# Patient Record
Sex: FEMALE | Race: WHITE | NOT HISPANIC OR LATINO | Employment: OTHER | URBAN - METROPOLITAN AREA
[De-identification: names, ages, dates, MRNs, and addresses within clinical notes are randomized per-mention and may not be internally consistent; named-entity substitution may affect disease eponyms.]

---

## 2017-01-19 ENCOUNTER — COMMUNICATION - HEALTHEAST (OUTPATIENT)
Dept: FAMILY MEDICINE | Facility: CLINIC | Age: 67
End: 2017-01-19

## 2017-01-19 ENCOUNTER — COMMUNICATION - HEALTHEAST (OUTPATIENT)
Dept: CARDIOLOGY | Facility: CLINIC | Age: 67
End: 2017-01-19

## 2017-01-19 DIAGNOSIS — E78.5 HYPERLIPIDEMIA: ICD-10-CM

## 2017-04-22 ENCOUNTER — COMMUNICATION - HEALTHEAST (OUTPATIENT)
Dept: FAMILY MEDICINE | Facility: CLINIC | Age: 67
End: 2017-04-22

## 2017-04-22 DIAGNOSIS — K29.70 GASTRITIS: ICD-10-CM

## 2017-04-26 ENCOUNTER — COMMUNICATION - HEALTHEAST (OUTPATIENT)
Dept: FAMILY MEDICINE | Facility: CLINIC | Age: 67
End: 2017-04-26

## 2017-04-26 DIAGNOSIS — K29.70 GASTRITIS: ICD-10-CM

## 2017-05-02 ENCOUNTER — COMMUNICATION - HEALTHEAST (OUTPATIENT)
Dept: SCHEDULING | Facility: CLINIC | Age: 67
End: 2017-05-02

## 2017-05-03 ENCOUNTER — COMMUNICATION - HEALTHEAST (OUTPATIENT)
Dept: FAMILY MEDICINE | Facility: CLINIC | Age: 67
End: 2017-05-03

## 2017-05-10 ENCOUNTER — OFFICE VISIT - HEALTHEAST (OUTPATIENT)
Dept: FAMILY MEDICINE | Facility: CLINIC | Age: 67
End: 2017-05-10

## 2017-05-10 DIAGNOSIS — Z95.1 S/P CABG X 3: ICD-10-CM

## 2017-05-10 DIAGNOSIS — I20.9 ANGINA PECTORIS (H): ICD-10-CM

## 2017-05-10 DIAGNOSIS — Z09 HOSPITAL DISCHARGE FOLLOW-UP: ICD-10-CM

## 2017-05-10 DIAGNOSIS — K21.9 GERD (GASTROESOPHAGEAL REFLUX DISEASE): ICD-10-CM

## 2017-05-10 DIAGNOSIS — E78.5 HYPERLIPIDEMIA: ICD-10-CM

## 2017-05-10 ASSESSMENT — MIFFLIN-ST. JEOR: SCORE: 1520.7

## 2017-05-11 ENCOUNTER — OFFICE VISIT - HEALTHEAST (OUTPATIENT)
Dept: CARDIOLOGY | Facility: CLINIC | Age: 67
End: 2017-05-11

## 2017-05-11 DIAGNOSIS — Z95.1 S/P CABG X 3: ICD-10-CM

## 2017-05-11 DIAGNOSIS — I25.10 CORONARY ARTERY DISEASE DUE TO CALCIFIED CORONARY LESION: ICD-10-CM

## 2017-05-11 DIAGNOSIS — I25.84 CORONARY ARTERY DISEASE DUE TO CALCIFIED CORONARY LESION: ICD-10-CM

## 2017-05-11 ASSESSMENT — MIFFLIN-ST. JEOR: SCORE: 1520.7

## 2017-05-12 ENCOUNTER — COMMUNICATION - HEALTHEAST (OUTPATIENT)
Dept: CARDIOLOGY | Facility: CLINIC | Age: 67
End: 2017-05-12

## 2017-05-16 ENCOUNTER — COMMUNICATION - HEALTHEAST (OUTPATIENT)
Dept: FAMILY MEDICINE | Facility: CLINIC | Age: 67
End: 2017-05-16

## 2017-05-16 DIAGNOSIS — Z95.1 S/P CABG X 3: ICD-10-CM

## 2017-05-22 ENCOUNTER — SURGERY - HEALTHEAST (OUTPATIENT)
Dept: CARDIOLOGY | Facility: CLINIC | Age: 67
End: 2017-05-22

## 2017-05-24 ENCOUNTER — SURGERY - HEALTHEAST (OUTPATIENT)
Dept: CARDIOLOGY | Facility: CLINIC | Age: 67
End: 2017-05-24

## 2017-05-24 ASSESSMENT — MIFFLIN-ST. JEOR: SCORE: 1495.64

## 2017-06-02 ENCOUNTER — OFFICE VISIT - HEALTHEAST (OUTPATIENT)
Dept: CARDIOLOGY | Facility: CLINIC | Age: 67
End: 2017-06-02

## 2017-06-02 DIAGNOSIS — Z95.1 HX OF CABG: ICD-10-CM

## 2017-06-02 ASSESSMENT — MIFFLIN-ST. JEOR: SCORE: 1490.65

## 2017-06-20 ENCOUNTER — COMMUNICATION - HEALTHEAST (OUTPATIENT)
Dept: FAMILY MEDICINE | Facility: CLINIC | Age: 67
End: 2017-06-20

## 2017-06-20 DIAGNOSIS — Z95.1 S/P CABG X 3: ICD-10-CM

## 2017-06-22 ENCOUNTER — COMMUNICATION - HEALTHEAST (OUTPATIENT)
Dept: CARDIOLOGY | Facility: CLINIC | Age: 67
End: 2017-06-22

## 2017-07-07 ENCOUNTER — OFFICE VISIT - HEALTHEAST (OUTPATIENT)
Dept: CARDIOLOGY | Facility: CLINIC | Age: 67
End: 2017-07-07

## 2017-07-07 DIAGNOSIS — I25.84 CORONARY ARTERY DISEASE DUE TO CALCIFIED CORONARY LESION: ICD-10-CM

## 2017-07-07 DIAGNOSIS — I25.10 CORONARY ARTERY DISEASE DUE TO CALCIFIED CORONARY LESION: ICD-10-CM

## 2017-07-07 ASSESSMENT — MIFFLIN-ST. JEOR: SCORE: 1490.65

## 2017-07-10 ENCOUNTER — HOSPITAL ENCOUNTER (OUTPATIENT)
Dept: CARDIOLOGY | Facility: HOSPITAL | Age: 67
Discharge: HOME | End: 2017-07-10
Attending: INTERNAL MEDICINE

## 2017-07-10 DIAGNOSIS — I25.84 CORONARY ARTERY DISEASE DUE TO CALCIFIED CORONARY LESION: ICD-10-CM

## 2017-07-10 DIAGNOSIS — I25.10 CORONARY ARTERY DISEASE DUE TO CALCIFIED CORONARY LESION: ICD-10-CM

## 2017-09-13 ENCOUNTER — RECORDS - HEALTHEAST (OUTPATIENT)
Dept: GENERAL RADIOLOGY | Facility: CLINIC | Age: 67
End: 2017-09-13

## 2017-09-13 ENCOUNTER — OFFICE VISIT - HEALTHEAST (OUTPATIENT)
Dept: FAMILY MEDICINE | Facility: CLINIC | Age: 67
End: 2017-09-13

## 2017-09-13 DIAGNOSIS — M79.673 PAIN IN UNSPECIFIED FOOT: ICD-10-CM

## 2017-09-13 DIAGNOSIS — M79.673 FOOT PAIN: ICD-10-CM

## 2017-09-13 DIAGNOSIS — M79.642 HAND PAIN, LEFT: ICD-10-CM

## 2017-09-13 DIAGNOSIS — M79.642 PAIN IN LEFT HAND: ICD-10-CM

## 2017-10-09 ENCOUNTER — OFFICE VISIT - HEALTHEAST (OUTPATIENT)
Dept: CARDIOLOGY | Facility: CLINIC | Age: 67
End: 2017-10-09

## 2017-10-09 DIAGNOSIS — E78.00 HYPERCHOLESTEROLEMIA: ICD-10-CM

## 2017-10-09 LAB
CHOLEST SERPL-MCNC: 217 MG/DL
FASTING STATUS PATIENT QL REPORTED: YES
HDLC SERPL-MCNC: 42 MG/DL
LDLC SERPL CALC-MCNC: 135 MG/DL
TRIGL SERPL-MCNC: 202 MG/DL

## 2017-10-09 ASSESSMENT — MIFFLIN-ST. JEOR: SCORE: 1499.73

## 2017-10-12 ENCOUNTER — COMMUNICATION - HEALTHEAST (OUTPATIENT)
Dept: CARDIOLOGY | Facility: CLINIC | Age: 67
End: 2017-10-12

## 2017-10-12 DIAGNOSIS — I25.10 CORONARY ARTERY DISEASE DUE TO CALCIFIED CORONARY LESION: ICD-10-CM

## 2017-10-12 DIAGNOSIS — I25.84 CORONARY ARTERY DISEASE DUE TO CALCIFIED CORONARY LESION: ICD-10-CM

## 2018-04-02 ENCOUNTER — OFFICE VISIT - HEALTHEAST (OUTPATIENT)
Dept: FAMILY MEDICINE | Facility: CLINIC | Age: 68
End: 2018-04-02

## 2018-04-02 DIAGNOSIS — I25.10 CORONARY ARTERY DISEASE DUE TO CALCIFIED CORONARY LESION: ICD-10-CM

## 2018-04-02 DIAGNOSIS — Z96.652 STATUS POST LEFT KNEE REPLACEMENT: ICD-10-CM

## 2018-04-02 DIAGNOSIS — I25.84 CORONARY ARTERY DISEASE DUE TO CALCIFIED CORONARY LESION: ICD-10-CM

## 2018-04-02 DIAGNOSIS — I82.402 LEFT LEG DVT (H): ICD-10-CM

## 2018-04-02 DIAGNOSIS — Z95.1 S/P CABG X 3: ICD-10-CM

## 2018-04-02 DIAGNOSIS — M17.11 PRIMARY OSTEOARTHRITIS OF RIGHT KNEE: ICD-10-CM

## 2018-04-02 LAB
ALT SERPL W P-5'-P-CCNC: 19 U/L (ref 0–45)
AST SERPL W P-5'-P-CCNC: 20 U/L (ref 0–40)
CHOLEST SERPL-MCNC: 184 MG/DL
FASTING STATUS PATIENT QL REPORTED: YES
HDLC SERPL-MCNC: 45 MG/DL
LDLC SERPL CALC-MCNC: 104 MG/DL
TRIGL SERPL-MCNC: 177 MG/DL

## 2018-04-03 ENCOUNTER — COMMUNICATION - HEALTHEAST (OUTPATIENT)
Dept: FAMILY MEDICINE | Facility: CLINIC | Age: 68
End: 2018-04-03

## 2018-04-18 ENCOUNTER — RECORDS - HEALTHEAST (OUTPATIENT)
Dept: ADMINISTRATIVE | Facility: OTHER | Age: 68
End: 2018-04-18

## 2018-04-22 ENCOUNTER — COMMUNICATION - HEALTHEAST (OUTPATIENT)
Dept: FAMILY MEDICINE | Facility: CLINIC | Age: 68
End: 2018-04-22

## 2018-04-22 DIAGNOSIS — K29.70 GASTRITIS: ICD-10-CM

## 2018-04-25 ENCOUNTER — OFFICE VISIT - HEALTHEAST (OUTPATIENT)
Dept: CARDIOLOGY | Facility: CLINIC | Age: 68
End: 2018-04-25

## 2018-04-25 DIAGNOSIS — I25.10 CORONARY ARTERY DISEASE DUE TO CALCIFIED CORONARY LESION: ICD-10-CM

## 2018-04-25 DIAGNOSIS — I25.84 CORONARY ARTERY DISEASE DUE TO CALCIFIED CORONARY LESION: ICD-10-CM

## 2018-04-25 ASSESSMENT — MIFFLIN-ST. JEOR: SCORE: 1540.55

## 2018-04-26 ENCOUNTER — OFFICE VISIT - HEALTHEAST (OUTPATIENT)
Dept: FAMILY MEDICINE | Facility: CLINIC | Age: 68
End: 2018-04-26

## 2018-04-26 DIAGNOSIS — Z01.818 PREOP EXAMINATION: ICD-10-CM

## 2018-04-26 DIAGNOSIS — I82.402 LEFT LEG DVT (H): ICD-10-CM

## 2018-04-26 DIAGNOSIS — M17.11 PRIMARY OSTEOARTHRITIS OF RIGHT KNEE: ICD-10-CM

## 2018-04-26 DIAGNOSIS — G47.33 OBSTRUCTIVE SLEEP APNEA: ICD-10-CM

## 2018-04-26 DIAGNOSIS — Z95.1 S/P CABG X 3: ICD-10-CM

## 2018-04-26 DIAGNOSIS — Z86.711 HISTORY OF PULMONARY EMBOLISM: ICD-10-CM

## 2018-04-26 DIAGNOSIS — E78.5 DYSLIPIDEMIA: ICD-10-CM

## 2018-04-26 LAB
ANION GAP SERPL CALCULATED.3IONS-SCNC: 14 MMOL/L (ref 5–18)
ATRIAL RATE - MUSE: 74 BPM
BUN SERPL-MCNC: 23 MG/DL (ref 8–22)
CALCIUM SERPL-MCNC: 9.6 MG/DL (ref 8.5–10.5)
CHLORIDE BLD-SCNC: 103 MMOL/L (ref 98–107)
CO2 SERPL-SCNC: 23 MMOL/L (ref 22–31)
CREAT SERPL-MCNC: 0.91 MG/DL (ref 0.6–1.1)
DIASTOLIC BLOOD PRESSURE - MUSE: NORMAL MMHG
ERYTHROCYTE [DISTWIDTH] IN BLOOD BY AUTOMATED COUNT: 13 % (ref 11–14.5)
GFR SERPL CREATININE-BSD FRML MDRD: >60 ML/MIN/1.73M2
GLUCOSE BLD-MCNC: 98 MG/DL (ref 70–125)
HCT VFR BLD AUTO: 38.9 % (ref 35–47)
HCV AB SERPL QL IA: NEGATIVE
HGB BLD-MCNC: 12.5 G/DL (ref 12–16)
INR PPP: 0.98 (ref 0.9–1.1)
INTERPRETATION ECG - MUSE: NORMAL
MCH RBC QN AUTO: 30.3 PG (ref 27–34)
MCHC RBC AUTO-ENTMCNC: 32.2 G/DL (ref 32–36)
MCV RBC AUTO: 94 FL (ref 80–100)
P AXIS - MUSE: 16 DEGREES
PLATELET # BLD AUTO: 253 THOU/UL (ref 140–440)
PMV BLD AUTO: 7.2 FL (ref 7–10)
POTASSIUM BLD-SCNC: 4.8 MMOL/L (ref 3.5–5)
PR INTERVAL - MUSE: 180 MS
QRS DURATION - MUSE: 94 MS
QT - MUSE: 402 MS
QTC - MUSE: 446 MS
R AXIS - MUSE: 21 DEGREES
RBC # BLD AUTO: 4.13 MILL/UL (ref 3.8–5.4)
SODIUM SERPL-SCNC: 140 MMOL/L (ref 136–145)
SYSTOLIC BLOOD PRESSURE - MUSE: NORMAL MMHG
T AXIS - MUSE: 68 DEGREES
VENTRICULAR RATE- MUSE: 74 BPM
WBC: 6 THOU/UL (ref 4–11)

## 2018-04-26 ASSESSMENT — MIFFLIN-ST. JEOR: SCORE: 1545.08

## 2018-04-27 ENCOUNTER — COMMUNICATION - HEALTHEAST (OUTPATIENT)
Dept: FAMILY MEDICINE | Facility: CLINIC | Age: 68
End: 2018-04-27

## 2018-05-02 ASSESSMENT — MIFFLIN-ST. JEOR: SCORE: 1545.08

## 2018-05-08 ENCOUNTER — ANESTHESIA - HEALTHEAST (OUTPATIENT)
Dept: SURGERY | Facility: CLINIC | Age: 68
End: 2018-05-08

## 2018-05-08 ENCOUNTER — SURGERY - HEALTHEAST (OUTPATIENT)
Dept: SURGERY | Facility: CLINIC | Age: 68
End: 2018-05-08

## 2018-05-08 ASSESSMENT — MIFFLIN-ST. JEOR
SCORE: 1530.34
SCORE: 1530.57

## 2018-05-14 ENCOUNTER — AMBULATORY - HEALTHEAST (OUTPATIENT)
Dept: GERIATRICS | Facility: CLINIC | Age: 68
End: 2018-05-14

## 2018-05-15 ENCOUNTER — OFFICE VISIT - HEALTHEAST (OUTPATIENT)
Dept: GERIATRICS | Facility: CLINIC | Age: 68
End: 2018-05-15

## 2018-05-15 DIAGNOSIS — Z86.718 HISTORY OF DVT (DEEP VEIN THROMBOSIS): ICD-10-CM

## 2018-05-15 DIAGNOSIS — I10 HYPERTENSION: ICD-10-CM

## 2018-05-15 DIAGNOSIS — Z96.651 STATUS POST TOTAL RIGHT KNEE REPLACEMENT: ICD-10-CM

## 2018-05-15 DIAGNOSIS — K21.9 GASTROESOPHAGEAL REFLUX DISEASE WITHOUT ESOPHAGITIS: ICD-10-CM

## 2018-05-17 ENCOUNTER — HOME CARE/HOSPICE - HEALTHEAST (OUTPATIENT)
Dept: HOME HEALTH SERVICES | Facility: HOME HEALTH | Age: 68
End: 2018-05-17

## 2018-05-17 ENCOUNTER — OFFICE VISIT - HEALTHEAST (OUTPATIENT)
Dept: GERIATRICS | Facility: CLINIC | Age: 68
End: 2018-05-17

## 2018-05-17 DIAGNOSIS — Z86.718 HISTORY OF DVT (DEEP VEIN THROMBOSIS): ICD-10-CM

## 2018-05-17 DIAGNOSIS — I10 HYPERTENSION: ICD-10-CM

## 2018-05-17 DIAGNOSIS — Z96.651 STATUS POST TOTAL RIGHT KNEE REPLACEMENT: ICD-10-CM

## 2018-05-20 ENCOUNTER — COMMUNICATION - HEALTHEAST (OUTPATIENT)
Dept: HOME HEALTH SERVICES | Facility: HOME HEALTH | Age: 68
End: 2018-05-20

## 2018-05-20 ENCOUNTER — HOME CARE/HOSPICE - HEALTHEAST (OUTPATIENT)
Dept: HOME HEALTH SERVICES | Facility: HOME HEALTH | Age: 68
End: 2018-05-20

## 2018-05-21 ENCOUNTER — HOME CARE/HOSPICE - HEALTHEAST (OUTPATIENT)
Dept: HOME HEALTH SERVICES | Facility: HOME HEALTH | Age: 68
End: 2018-05-21

## 2018-05-21 ENCOUNTER — AMBULATORY - HEALTHEAST (OUTPATIENT)
Dept: GERIATRICS | Facility: CLINIC | Age: 68
End: 2018-05-21

## 2018-05-21 ENCOUNTER — COMMUNICATION - HEALTHEAST (OUTPATIENT)
Dept: GERIATRICS | Facility: CLINIC | Age: 68
End: 2018-05-21

## 2018-05-22 ENCOUNTER — HOME CARE/HOSPICE - HEALTHEAST (OUTPATIENT)
Dept: HOME HEALTH SERVICES | Facility: HOME HEALTH | Age: 68
End: 2018-05-22

## 2018-05-23 ENCOUNTER — RECORDS - HEALTHEAST (OUTPATIENT)
Dept: ADMINISTRATIVE | Facility: OTHER | Age: 68
End: 2018-05-23

## 2018-05-24 ENCOUNTER — OFFICE VISIT - HEALTHEAST (OUTPATIENT)
Dept: FAMILY MEDICINE | Facility: CLINIC | Age: 68
End: 2018-05-24

## 2018-05-24 ENCOUNTER — HOME CARE/HOSPICE - HEALTHEAST (OUTPATIENT)
Dept: HOME HEALTH SERVICES | Facility: HOME HEALTH | Age: 68
End: 2018-05-24

## 2018-05-24 DIAGNOSIS — L08.9 RIGHT KNEE SKIN INFECTION: ICD-10-CM

## 2018-05-24 DIAGNOSIS — Z12.31 VISIT FOR SCREENING MAMMOGRAM: ICD-10-CM

## 2018-05-24 DIAGNOSIS — K29.70 GASTRITIS: ICD-10-CM

## 2018-05-25 ENCOUNTER — HOME CARE/HOSPICE - HEALTHEAST (OUTPATIENT)
Dept: HOME HEALTH SERVICES | Facility: HOME HEALTH | Age: 68
End: 2018-05-25

## 2018-05-26 ENCOUNTER — HOME CARE/HOSPICE - HEALTHEAST (OUTPATIENT)
Dept: HOME HEALTH SERVICES | Facility: HOME HEALTH | Age: 68
End: 2018-05-26

## 2018-05-29 ENCOUNTER — HOME CARE/HOSPICE - HEALTHEAST (OUTPATIENT)
Dept: HOME HEALTH SERVICES | Facility: HOME HEALTH | Age: 68
End: 2018-05-29

## 2018-05-30 ENCOUNTER — HOME CARE/HOSPICE - HEALTHEAST (OUTPATIENT)
Dept: HOME HEALTH SERVICES | Facility: HOME HEALTH | Age: 68
End: 2018-05-30

## 2018-05-31 ENCOUNTER — HOME CARE/HOSPICE - HEALTHEAST (OUTPATIENT)
Dept: HOME HEALTH SERVICES | Facility: HOME HEALTH | Age: 68
End: 2018-05-31

## 2018-06-01 ENCOUNTER — HOME CARE/HOSPICE - HEALTHEAST (OUTPATIENT)
Dept: HOME HEALTH SERVICES | Facility: HOME HEALTH | Age: 68
End: 2018-06-01

## 2018-06-04 ENCOUNTER — HOME CARE/HOSPICE - HEALTHEAST (OUTPATIENT)
Dept: HOME HEALTH SERVICES | Facility: HOME HEALTH | Age: 68
End: 2018-06-04

## 2018-06-06 ENCOUNTER — HOME CARE/HOSPICE - HEALTHEAST (OUTPATIENT)
Dept: HOME HEALTH SERVICES | Facility: HOME HEALTH | Age: 68
End: 2018-06-06

## 2018-06-07 ENCOUNTER — HOME CARE/HOSPICE - HEALTHEAST (OUTPATIENT)
Dept: HOME HEALTH SERVICES | Facility: HOME HEALTH | Age: 68
End: 2018-06-07

## 2018-06-08 ENCOUNTER — HOME CARE/HOSPICE - HEALTHEAST (OUTPATIENT)
Dept: HOME HEALTH SERVICES | Facility: HOME HEALTH | Age: 68
End: 2018-06-08

## 2018-06-11 ENCOUNTER — HOME CARE/HOSPICE - HEALTHEAST (OUTPATIENT)
Dept: HOME HEALTH SERVICES | Facility: HOME HEALTH | Age: 68
End: 2018-06-11

## 2018-06-14 ENCOUNTER — HOME CARE/HOSPICE - HEALTHEAST (OUTPATIENT)
Dept: HOME HEALTH SERVICES | Facility: HOME HEALTH | Age: 68
End: 2018-06-14

## 2018-06-16 ENCOUNTER — COMMUNICATION - HEALTHEAST (OUTPATIENT)
Dept: FAMILY MEDICINE | Facility: CLINIC | Age: 68
End: 2018-06-16

## 2018-06-16 DIAGNOSIS — Z95.1 S/P CABG X 3: ICD-10-CM

## 2018-06-28 ENCOUNTER — RECORDS - HEALTHEAST (OUTPATIENT)
Dept: ADMINISTRATIVE | Facility: OTHER | Age: 68
End: 2018-06-28

## 2018-07-27 ENCOUNTER — COMMUNICATION - HEALTHEAST (OUTPATIENT)
Dept: ADMINISTRATIVE | Facility: CLINIC | Age: 68
End: 2018-07-27

## 2018-08-23 ENCOUNTER — RECORDS - HEALTHEAST (OUTPATIENT)
Dept: ADMINISTRATIVE | Facility: OTHER | Age: 68
End: 2018-08-23

## 2018-10-09 ENCOUNTER — AMBULATORY - HEALTHEAST (OUTPATIENT)
Dept: NURSING | Facility: CLINIC | Age: 68
End: 2018-10-09

## 2018-10-09 DIAGNOSIS — Z23 FLU VACCINE NEED: ICD-10-CM

## 2018-10-12 ENCOUNTER — OFFICE VISIT - HEALTHEAST (OUTPATIENT)
Dept: CARDIOLOGY | Facility: CLINIC | Age: 68
End: 2018-10-12

## 2018-10-12 ENCOUNTER — HOSPITAL ENCOUNTER (OUTPATIENT)
Dept: MAMMOGRAPHY | Facility: CLINIC | Age: 68
Discharge: HOME | End: 2018-10-12
Attending: FAMILY MEDICINE

## 2018-10-12 DIAGNOSIS — Z12.31 VISIT FOR SCREENING MAMMOGRAM: ICD-10-CM

## 2018-10-12 DIAGNOSIS — E78.00 HYPERCHOLESTEREMIA: ICD-10-CM

## 2018-10-12 LAB — LDLC SERPL CALC-MCNC: 118 MG/DL

## 2018-10-12 ASSESSMENT — MIFFLIN-ST. JEOR: SCORE: 1557.55

## 2018-10-15 ENCOUNTER — AMBULATORY - HEALTHEAST (OUTPATIENT)
Dept: CARDIOLOGY | Facility: CLINIC | Age: 68
End: 2018-10-15

## 2018-10-15 DIAGNOSIS — I25.10 CAD (CORONARY ARTERY DISEASE): ICD-10-CM

## 2018-10-15 DIAGNOSIS — E78.5 DYSLIPIDEMIA, GOAL LDL BELOW 70: ICD-10-CM

## 2018-10-19 ENCOUNTER — COMMUNICATION - HEALTHEAST (OUTPATIENT)
Dept: CARDIOLOGY | Facility: CLINIC | Age: 68
End: 2018-10-19

## 2018-11-09 ENCOUNTER — COMMUNICATION - HEALTHEAST (OUTPATIENT)
Dept: CARDIOLOGY | Facility: CLINIC | Age: 68
End: 2018-11-09

## 2018-11-09 DIAGNOSIS — I25.84 CORONARY ARTERY DISEASE DUE TO CALCIFIED CORONARY LESION: ICD-10-CM

## 2018-11-09 DIAGNOSIS — I25.10 CORONARY ARTERY DISEASE DUE TO CALCIFIED CORONARY LESION: ICD-10-CM

## 2018-12-05 ENCOUNTER — RECORDS - HEALTHEAST (OUTPATIENT)
Dept: ADMINISTRATIVE | Facility: OTHER | Age: 68
End: 2018-12-05

## 2019-01-14 ENCOUNTER — AMBULATORY - HEALTHEAST (OUTPATIENT)
Dept: LAB | Facility: CLINIC | Age: 69
End: 2019-01-14

## 2019-01-14 DIAGNOSIS — E78.5 DYSLIPIDEMIA, GOAL LDL BELOW 70: ICD-10-CM

## 2019-01-14 DIAGNOSIS — I25.10 CAD (CORONARY ARTERY DISEASE): ICD-10-CM

## 2019-01-14 LAB — LDLC SERPL CALC-MCNC: 92 MG/DL

## 2019-03-31 ENCOUNTER — RECORDS - HEALTHEAST (OUTPATIENT)
Dept: ADMINISTRATIVE | Facility: OTHER | Age: 69
End: 2019-03-31

## 2019-04-13 ENCOUNTER — COMMUNICATION - HEALTHEAST (OUTPATIENT)
Dept: FAMILY MEDICINE | Facility: CLINIC | Age: 69
End: 2019-04-13

## 2019-04-13 DIAGNOSIS — Z95.1 S/P CABG X 3: ICD-10-CM

## 2019-04-16 ENCOUNTER — COMMUNICATION - HEALTHEAST (OUTPATIENT)
Dept: FAMILY MEDICINE | Facility: CLINIC | Age: 69
End: 2019-04-16

## 2019-04-16 DIAGNOSIS — Z95.1 S/P CABG X 3: ICD-10-CM

## 2019-04-17 ENCOUNTER — OFFICE VISIT - HEALTHEAST (OUTPATIENT)
Dept: FAMILY MEDICINE | Facility: CLINIC | Age: 69
End: 2019-04-17

## 2019-04-17 DIAGNOSIS — J06.9 VIRAL URI: ICD-10-CM

## 2019-04-17 ASSESSMENT — MIFFLIN-ST. JEOR: SCORE: 1567.76

## 2019-05-01 ENCOUNTER — COMMUNICATION - HEALTHEAST (OUTPATIENT)
Dept: FAMILY MEDICINE | Facility: CLINIC | Age: 69
End: 2019-05-01

## 2019-05-01 ENCOUNTER — AMBULATORY - HEALTHEAST (OUTPATIENT)
Dept: SCHEDULING | Facility: CLINIC | Age: 69
End: 2019-05-01

## 2019-05-01 ENCOUNTER — OFFICE VISIT - HEALTHEAST (OUTPATIENT)
Dept: FAMILY MEDICINE | Facility: CLINIC | Age: 69
End: 2019-05-01

## 2019-05-01 DIAGNOSIS — I25.84 CORONARY ARTERY DISEASE DUE TO CALCIFIED CORONARY LESION: ICD-10-CM

## 2019-05-01 DIAGNOSIS — Z00.00 ENCOUNTER FOR MEDICARE ANNUAL WELLNESS EXAM: ICD-10-CM

## 2019-05-01 DIAGNOSIS — Z86.2 HISTORY OF ANEMIA: ICD-10-CM

## 2019-05-01 DIAGNOSIS — Z00.00 ROUTINE GENERAL MEDICAL EXAMINATION AT A HEALTH CARE FACILITY: ICD-10-CM

## 2019-05-01 DIAGNOSIS — Z95.1 S/P CABG X 3: ICD-10-CM

## 2019-05-01 DIAGNOSIS — I25.10 CORONARY ARTERY DISEASE DUE TO CALCIFIED CORONARY LESION: ICD-10-CM

## 2019-05-01 DIAGNOSIS — R53.83 FATIGUE, UNSPECIFIED TYPE: ICD-10-CM

## 2019-05-01 DIAGNOSIS — Z78.0 POSTMENOPAUSAL STATUS: ICD-10-CM

## 2019-05-01 DIAGNOSIS — G47.33 OBSTRUCTIVE SLEEP APNEA: ICD-10-CM

## 2019-05-01 DIAGNOSIS — Z79.899 MEDICATION MANAGEMENT: ICD-10-CM

## 2019-05-01 LAB
ALBUMIN SERPL-MCNC: 3.8 G/DL (ref 3.5–5)
ALP SERPL-CCNC: 82 U/L (ref 45–120)
ALT SERPL W P-5'-P-CCNC: 32 U/L (ref 0–45)
ANION GAP SERPL CALCULATED.3IONS-SCNC: 13 MMOL/L (ref 5–18)
AST SERPL W P-5'-P-CCNC: 23 U/L (ref 0–40)
BILIRUB SERPL-MCNC: 0.8 MG/DL (ref 0–1)
BUN SERPL-MCNC: 24 MG/DL (ref 8–28)
CALCIUM SERPL-MCNC: 9.6 MG/DL (ref 8.5–10.5)
CHLORIDE BLD-SCNC: 111 MMOL/L (ref 98–107)
CHOLEST SERPL-MCNC: 154 MG/DL
CO2 SERPL-SCNC: 20 MMOL/L (ref 22–31)
CREAT SERPL-MCNC: 0.9 MG/DL (ref 0.6–1.1)
ERYTHROCYTE [DISTWIDTH] IN BLOOD BY AUTOMATED COUNT: 12.4 % (ref 11–14.5)
FASTING STATUS PATIENT QL REPORTED: YES
GFR SERPL CREATININE-BSD FRML MDRD: >60 ML/MIN/1.73M2
GLUCOSE BLD-MCNC: 99 MG/DL (ref 70–125)
HCT VFR BLD AUTO: 35.8 % (ref 35–47)
HDLC SERPL-MCNC: 43 MG/DL
HGB BLD-MCNC: 11.9 G/DL (ref 12–16)
LDLC SERPL CALC-MCNC: 77 MG/DL
MCH RBC QN AUTO: 30.6 PG (ref 27–34)
MCHC RBC AUTO-ENTMCNC: 33.3 G/DL (ref 32–36)
MCV RBC AUTO: 92 FL (ref 80–100)
PLATELET # BLD AUTO: 238 THOU/UL (ref 140–440)
PMV BLD AUTO: 6.5 FL (ref 7–10)
POTASSIUM BLD-SCNC: 4.3 MMOL/L (ref 3.5–5)
PROT SERPL-MCNC: 7.2 G/DL (ref 6–8)
RBC # BLD AUTO: 3.89 MILL/UL (ref 3.8–5.4)
SODIUM SERPL-SCNC: 144 MMOL/L (ref 136–145)
TRIGL SERPL-MCNC: 171 MG/DL
TSH SERPL DL<=0.005 MIU/L-ACNC: 1.89 UIU/ML (ref 0.3–5)
WBC: 6.2 THOU/UL (ref 4–11)

## 2019-05-01 ASSESSMENT — MIFFLIN-ST. JEOR: SCORE: 1564.64

## 2019-05-02 ENCOUNTER — COMMUNICATION - HEALTHEAST (OUTPATIENT)
Dept: CARDIOLOGY | Facility: CLINIC | Age: 69
End: 2019-05-02

## 2019-05-02 DIAGNOSIS — I25.10 CORONARY ARTERY DISEASE DUE TO CALCIFIED CORONARY LESION: ICD-10-CM

## 2019-05-02 DIAGNOSIS — I25.84 CORONARY ARTERY DISEASE DUE TO CALCIFIED CORONARY LESION: ICD-10-CM

## 2019-05-03 ENCOUNTER — COMMUNICATION - HEALTHEAST (OUTPATIENT)
Dept: CARDIOLOGY | Facility: CLINIC | Age: 69
End: 2019-05-03

## 2019-05-03 DIAGNOSIS — I25.10 CORONARY ARTERY DISEASE DUE TO CALCIFIED CORONARY LESION: ICD-10-CM

## 2019-05-03 DIAGNOSIS — I25.84 CORONARY ARTERY DISEASE DUE TO CALCIFIED CORONARY LESION: ICD-10-CM

## 2019-05-06 ENCOUNTER — COMMUNICATION - HEALTHEAST (OUTPATIENT)
Dept: FAMILY MEDICINE | Facility: CLINIC | Age: 69
End: 2019-05-06

## 2019-05-06 DIAGNOSIS — K29.70 GASTRITIS: ICD-10-CM

## 2019-05-21 ENCOUNTER — OFFICE VISIT - HEALTHEAST (OUTPATIENT)
Dept: FAMILY MEDICINE | Facility: CLINIC | Age: 69
End: 2019-05-21

## 2019-05-21 DIAGNOSIS — M81.0 AGE-RELATED OSTEOPOROSIS WITHOUT CURRENT PATHOLOGICAL FRACTURE: ICD-10-CM

## 2019-05-21 DIAGNOSIS — D64.9 NORMOCYTIC ANEMIA: ICD-10-CM

## 2019-05-21 LAB
HGB BLD-MCNC: 12.6 G/DL (ref 12–16)
PHOSPHATE SERPL-MCNC: 2.4 MG/DL (ref 2.5–4.5)

## 2019-05-21 ASSESSMENT — MIFFLIN-ST. JEOR: SCORE: 1550.18

## 2019-05-22 LAB
25(OH)D3 SERPL-MCNC: 37.3 NG/ML (ref 30–80)
25(OH)D3 SERPL-MCNC: 37.3 NG/ML (ref 30–80)

## 2019-07-14 ENCOUNTER — COMMUNICATION - HEALTHEAST (OUTPATIENT)
Dept: CARDIOLOGY | Facility: CLINIC | Age: 69
End: 2019-07-14

## 2019-07-14 DIAGNOSIS — Z95.1 S/P CABG X 3: ICD-10-CM

## 2019-07-17 ENCOUNTER — OFFICE VISIT - HEALTHEAST (OUTPATIENT)
Dept: SLEEP MEDICINE | Facility: CLINIC | Age: 69
End: 2019-07-17

## 2019-07-17 DIAGNOSIS — G47.33 OBSTRUCTIVE SLEEP APNEA: ICD-10-CM

## 2019-07-17 DIAGNOSIS — G47.8 SLEEP DYSFUNCTION WITH SLEEP STAGE DISTURBANCE: ICD-10-CM

## 2019-07-17 DIAGNOSIS — E66.01 MORBID OBESITY (H): ICD-10-CM

## 2019-07-17 ASSESSMENT — MIFFLIN-ST. JEOR: SCORE: 1585.33

## 2019-07-24 ENCOUNTER — COMMUNICATION - HEALTHEAST (OUTPATIENT)
Dept: ADMINISTRATIVE | Facility: CLINIC | Age: 69
End: 2019-07-24

## 2019-09-09 ENCOUNTER — AMBULATORY - HEALTHEAST (OUTPATIENT)
Dept: NURSING | Facility: CLINIC | Age: 69
End: 2019-09-09

## 2019-09-09 DIAGNOSIS — Z23 IMMUNIZATION DUE: ICD-10-CM

## 2019-10-07 ENCOUNTER — OFFICE VISIT - HEALTHEAST (OUTPATIENT)
Dept: CARDIOLOGY | Facility: CLINIC | Age: 69
End: 2019-10-07

## 2019-10-07 DIAGNOSIS — R06.09 DYSPNEA ON EXERTION: ICD-10-CM

## 2019-10-07 LAB — BNP SERPL-MCNC: 30 PG/ML (ref 0–120)

## 2019-10-07 ASSESSMENT — MIFFLIN-ST. JEOR: SCORE: 1585.33

## 2019-10-14 ENCOUNTER — COMMUNICATION - HEALTHEAST (OUTPATIENT)
Dept: CARDIOLOGY | Facility: CLINIC | Age: 69
End: 2019-10-14

## 2019-10-14 DIAGNOSIS — E78.5 DYSLIPIDEMIA, GOAL LDL BELOW 70: ICD-10-CM

## 2019-10-14 DIAGNOSIS — I25.10 CAD (CORONARY ARTERY DISEASE): ICD-10-CM

## 2019-10-15 ENCOUNTER — COMMUNICATION - HEALTHEAST (OUTPATIENT)
Dept: CARDIOLOGY | Facility: CLINIC | Age: 69
End: 2019-10-15

## 2019-10-15 ENCOUNTER — HOSPITAL ENCOUNTER (OUTPATIENT)
Dept: RESPIRATORY THERAPY | Facility: HOSPITAL | Age: 69
Discharge: HOME | End: 2019-10-15
Attending: INTERNAL MEDICINE

## 2019-10-15 ENCOUNTER — HOSPITAL ENCOUNTER (OUTPATIENT)
Dept: RADIOLOGY | Facility: HOSPITAL | Age: 69
Discharge: HOME | End: 2019-10-15
Attending: INTERNAL MEDICINE

## 2019-10-15 DIAGNOSIS — R06.09 OTHER FORMS OF DYSPNEA: ICD-10-CM

## 2019-10-15 DIAGNOSIS — R06.09 DYSPNEA ON EXERTION: ICD-10-CM

## 2019-10-15 LAB — HGB BLD-MCNC: 12.2 G/DL (ref 12–16)

## 2019-10-16 ENCOUNTER — COMMUNICATION - HEALTHEAST (OUTPATIENT)
Dept: CARDIOLOGY | Facility: CLINIC | Age: 69
End: 2019-10-16

## 2019-10-17 ENCOUNTER — RECORDS - HEALTHEAST (OUTPATIENT)
Dept: ADMINISTRATIVE | Facility: OTHER | Age: 69
End: 2019-10-17

## 2020-01-10 ENCOUNTER — COMMUNICATION - HEALTHEAST (OUTPATIENT)
Dept: FAMILY MEDICINE | Facility: CLINIC | Age: 70
End: 2020-01-10

## 2020-01-14 ENCOUNTER — COMMUNICATION - HEALTHEAST (OUTPATIENT)
Dept: CARDIOLOGY | Facility: CLINIC | Age: 70
End: 2020-01-14

## 2020-01-14 DIAGNOSIS — Z95.1 S/P CABG X 3: ICD-10-CM

## 2020-01-15 ENCOUNTER — OFFICE VISIT - HEALTHEAST (OUTPATIENT)
Dept: FAMILY MEDICINE | Facility: CLINIC | Age: 70
End: 2020-01-15

## 2020-01-15 DIAGNOSIS — J40 BRONCHITIS: ICD-10-CM

## 2020-01-15 DIAGNOSIS — R06.02 SHORTNESS OF BREATH: ICD-10-CM

## 2020-01-15 LAB
ERYTHROCYTE [DISTWIDTH] IN BLOOD BY AUTOMATED COUNT: 11.9 % (ref 11–14.5)
HCT VFR BLD AUTO: 39.5 % (ref 35–47)
HGB BLD-MCNC: 13.1 G/DL (ref 12–16)
MCH RBC QN AUTO: 30.9 PG (ref 27–34)
MCHC RBC AUTO-ENTMCNC: 33.2 G/DL (ref 32–36)
MCV RBC AUTO: 93 FL (ref 80–100)
PLATELET # BLD AUTO: 237 THOU/UL (ref 140–440)
PMV BLD AUTO: 6.8 FL (ref 7–10)
RBC # BLD AUTO: 4.24 MILL/UL (ref 3.8–5.4)
WBC: 5.8 THOU/UL (ref 4–11)

## 2020-01-27 ENCOUNTER — COMMUNICATION - HEALTHEAST (OUTPATIENT)
Dept: FAMILY MEDICINE | Facility: CLINIC | Age: 70
End: 2020-01-27

## 2020-01-29 ENCOUNTER — COMMUNICATION - HEALTHEAST (OUTPATIENT)
Dept: FAMILY MEDICINE | Facility: CLINIC | Age: 70
End: 2020-01-29

## 2020-02-02 ENCOUNTER — COMMUNICATION - HEALTHEAST (OUTPATIENT)
Dept: CARDIOLOGY | Facility: CLINIC | Age: 70
End: 2020-02-02

## 2020-02-02 DIAGNOSIS — I25.10 CORONARY ARTERY DISEASE DUE TO CALCIFIED CORONARY LESION: ICD-10-CM

## 2020-02-02 DIAGNOSIS — I25.84 CORONARY ARTERY DISEASE DUE TO CALCIFIED CORONARY LESION: ICD-10-CM

## 2020-02-05 ENCOUNTER — OFFICE VISIT - HEALTHEAST (OUTPATIENT)
Dept: FAMILY MEDICINE | Facility: CLINIC | Age: 70
End: 2020-02-05

## 2020-02-05 DIAGNOSIS — K21.9 GASTROESOPHAGEAL REFLUX DISEASE WITHOUT ESOPHAGITIS: ICD-10-CM

## 2020-02-05 DIAGNOSIS — M17.9 OSTEOARTHRITIS OF KNEE, UNSPECIFIED LATERALITY, UNSPECIFIED OSTEOARTHRITIS TYPE: ICD-10-CM

## 2020-02-05 DIAGNOSIS — Z02.89 ENCOUNTER FOR COMPLETION OF FORM WITH PATIENT: ICD-10-CM

## 2020-02-27 ENCOUNTER — COMMUNICATION - HEALTHEAST (OUTPATIENT)
Dept: FAMILY MEDICINE | Facility: CLINIC | Age: 70
End: 2020-02-27

## 2020-02-27 DIAGNOSIS — K21.9 GASTROESOPHAGEAL REFLUX DISEASE WITHOUT ESOPHAGITIS: ICD-10-CM

## 2020-03-05 ENCOUNTER — OFFICE VISIT - HEALTHEAST (OUTPATIENT)
Dept: FAMILY MEDICINE | Facility: CLINIC | Age: 70
End: 2020-03-05

## 2020-03-05 DIAGNOSIS — K21.9 GASTROESOPHAGEAL REFLUX DISEASE WITHOUT ESOPHAGITIS: ICD-10-CM

## 2020-03-05 ASSESSMENT — MIFFLIN-ST. JEOR: SCORE: 1589.87

## 2020-03-07 ENCOUNTER — COMMUNICATION - HEALTHEAST (OUTPATIENT)
Dept: FAMILY MEDICINE | Facility: CLINIC | Age: 70
End: 2020-03-07

## 2020-03-07 DIAGNOSIS — M81.0 AGE-RELATED OSTEOPOROSIS WITHOUT CURRENT PATHOLOGICAL FRACTURE: ICD-10-CM

## 2020-05-24 ENCOUNTER — COMMUNICATION - HEALTHEAST (OUTPATIENT)
Dept: FAMILY MEDICINE | Facility: CLINIC | Age: 70
End: 2020-05-24

## 2020-05-24 DIAGNOSIS — M81.0 AGE-RELATED OSTEOPOROSIS WITHOUT CURRENT PATHOLOGICAL FRACTURE: ICD-10-CM

## 2020-07-05 ENCOUNTER — COMMUNICATION - HEALTHEAST (OUTPATIENT)
Dept: CARDIOLOGY | Facility: CLINIC | Age: 70
End: 2020-07-05

## 2020-07-05 DIAGNOSIS — Z95.1 S/P CABG X 3: ICD-10-CM

## 2020-07-28 ENCOUNTER — COMMUNICATION - HEALTHEAST (OUTPATIENT)
Dept: CARDIOLOGY | Facility: CLINIC | Age: 70
End: 2020-07-28

## 2020-07-28 DIAGNOSIS — I25.10 CORONARY ARTERY DISEASE DUE TO CALCIFIED CORONARY LESION: ICD-10-CM

## 2020-07-28 DIAGNOSIS — I25.84 CORONARY ARTERY DISEASE DUE TO CALCIFIED CORONARY LESION: ICD-10-CM

## 2020-08-02 ENCOUNTER — RECORDS - HEALTHEAST (OUTPATIENT)
Dept: ADMINISTRATIVE | Facility: OTHER | Age: 70
End: 2020-08-02

## 2020-08-02 LAB — OCCULT BLOOD (FIT)_EXT (HISTORICAL CONVERSION): NEGATIVE

## 2020-08-12 ENCOUNTER — COMMUNICATION - HEALTHEAST (OUTPATIENT)
Dept: FAMILY MEDICINE | Facility: CLINIC | Age: 70
End: 2020-08-12

## 2020-08-12 DIAGNOSIS — M81.0 AGE-RELATED OSTEOPOROSIS WITHOUT CURRENT PATHOLOGICAL FRACTURE: ICD-10-CM

## 2020-08-12 RX ORDER — ALENDRONATE SODIUM 70 MG/1
TABLET ORAL
Qty: 12 TABLET | Refills: 3 | Status: SHIPPED | OUTPATIENT
Start: 2020-08-12 | End: 2021-09-20

## 2020-08-18 ENCOUNTER — OFFICE VISIT - HEALTHEAST (OUTPATIENT)
Dept: FAMILY MEDICINE | Facility: CLINIC | Age: 70
End: 2020-08-18

## 2020-08-18 DIAGNOSIS — Z95.1 S/P CABG X 3: ICD-10-CM

## 2020-08-18 DIAGNOSIS — L85.3 DRY SKIN: ICD-10-CM

## 2020-08-18 DIAGNOSIS — E78.00 HYPERCHOLESTEREMIA: ICD-10-CM

## 2020-08-18 DIAGNOSIS — R06.09 DYSPNEA ON EXERTION: ICD-10-CM

## 2020-08-18 DIAGNOSIS — M25.511 ACUTE PAIN OF RIGHT SHOULDER: ICD-10-CM

## 2020-08-18 DIAGNOSIS — Z00.00 MEDICARE ANNUAL WELLNESS VISIT, INITIAL: ICD-10-CM

## 2020-08-18 DIAGNOSIS — R61 EXCESSIVE SWEATING: ICD-10-CM

## 2020-08-18 DIAGNOSIS — Z12.31 VISIT FOR SCREENING MAMMOGRAM: ICD-10-CM

## 2020-08-18 DIAGNOSIS — K21.9 GASTROESOPHAGEAL REFLUX DISEASE WITHOUT ESOPHAGITIS: ICD-10-CM

## 2020-08-18 LAB
ALBUMIN SERPL-MCNC: 3.9 G/DL (ref 3.5–5)
ALP SERPL-CCNC: 74 U/L (ref 45–120)
ALT SERPL W P-5'-P-CCNC: 26 U/L (ref 0–45)
ANION GAP SERPL CALCULATED.3IONS-SCNC: 12 MMOL/L (ref 5–18)
AST SERPL W P-5'-P-CCNC: 26 U/L (ref 0–40)
BILIRUB SERPL-MCNC: 1 MG/DL (ref 0–1)
BUN SERPL-MCNC: 17 MG/DL (ref 8–28)
CALCIUM SERPL-MCNC: 9.2 MG/DL (ref 8.5–10.5)
CHLORIDE BLD-SCNC: 109 MMOL/L (ref 98–107)
CHOLEST SERPL-MCNC: 150 MG/DL
CO2 SERPL-SCNC: 23 MMOL/L (ref 22–31)
CREAT SERPL-MCNC: 0.97 MG/DL (ref 0.6–1.1)
ERYTHROCYTE [DISTWIDTH] IN BLOOD BY AUTOMATED COUNT: 12.8 % (ref 11–14.5)
FASTING STATUS PATIENT QL REPORTED: YES
GFR SERPL CREATININE-BSD FRML MDRD: 57 ML/MIN/1.73M2
GLUCOSE BLD-MCNC: 95 MG/DL (ref 70–125)
HCT VFR BLD AUTO: 37.9 % (ref 35–47)
HDLC SERPL-MCNC: 38 MG/DL
HGB BLD-MCNC: 12.4 G/DL (ref 12–16)
LDLC SERPL CALC-MCNC: 70 MG/DL
MCH RBC QN AUTO: 30.8 PG (ref 27–34)
MCHC RBC AUTO-ENTMCNC: 32.8 G/DL (ref 32–36)
MCV RBC AUTO: 94 FL (ref 80–100)
PLATELET # BLD AUTO: 231 THOU/UL (ref 140–440)
PMV BLD AUTO: 7 FL (ref 7–10)
POTASSIUM BLD-SCNC: 4.3 MMOL/L (ref 3.5–5)
PROT SERPL-MCNC: 7 G/DL (ref 6–8)
RBC # BLD AUTO: 4.04 MILL/UL (ref 3.8–5.4)
SODIUM SERPL-SCNC: 144 MMOL/L (ref 136–145)
TRIGL SERPL-MCNC: 212 MG/DL
TSH SERPL DL<=0.005 MIU/L-ACNC: 1.42 UIU/ML (ref 0.3–5)
WBC: 6.3 THOU/UL (ref 4–11)

## 2020-08-18 ASSESSMENT — MIFFLIN-ST. JEOR: SCORE: 1585.9

## 2020-09-03 ENCOUNTER — COMMUNICATION - HEALTHEAST (OUTPATIENT)
Dept: CARDIOLOGY | Facility: CLINIC | Age: 70
End: 2020-09-03

## 2020-09-03 DIAGNOSIS — I25.84 CORONARY ARTERY DISEASE DUE TO CALCIFIED CORONARY LESION: ICD-10-CM

## 2020-09-03 DIAGNOSIS — I25.10 CORONARY ARTERY DISEASE DUE TO CALCIFIED CORONARY LESION: ICD-10-CM

## 2020-09-03 DIAGNOSIS — I25.10 CAD (CORONARY ARTERY DISEASE): ICD-10-CM

## 2020-09-03 DIAGNOSIS — E78.5 DYSLIPIDEMIA, GOAL LDL BELOW 70: ICD-10-CM

## 2020-09-08 ENCOUNTER — RECORDS - HEALTHEAST (OUTPATIENT)
Dept: HEALTH INFORMATION MANAGEMENT | Facility: CLINIC | Age: 70
End: 2020-09-08

## 2020-10-06 ENCOUNTER — COMMUNICATION - HEALTHEAST (OUTPATIENT)
Dept: CARDIOLOGY | Facility: CLINIC | Age: 70
End: 2020-10-06

## 2020-10-07 ENCOUNTER — OFFICE VISIT - HEALTHEAST (OUTPATIENT)
Dept: CARDIOLOGY | Facility: CLINIC | Age: 70
End: 2020-10-07

## 2020-10-07 ENCOUNTER — HOSPITAL ENCOUNTER (OUTPATIENT)
Dept: MAMMOGRAPHY | Facility: CLINIC | Age: 70
Discharge: HOME | End: 2020-10-07
Attending: FAMILY MEDICINE

## 2020-10-07 DIAGNOSIS — Z12.31 VISIT FOR SCREENING MAMMOGRAM: ICD-10-CM

## 2020-10-07 DIAGNOSIS — R06.09 DYSPNEA ON EXERTION: ICD-10-CM

## 2020-10-07 ASSESSMENT — MIFFLIN-ST. JEOR: SCORE: 1567.19

## 2020-11-04 ENCOUNTER — HOSPITAL ENCOUNTER (OUTPATIENT)
Dept: CARDIOLOGY | Facility: HOSPITAL | Age: 70
Discharge: HOME | End: 2020-11-04
Attending: INTERNAL MEDICINE

## 2020-11-04 DIAGNOSIS — R06.09 DYSPNEA ON EXERTION: ICD-10-CM

## 2020-11-04 LAB
AORTIC ROOT: 3.2 CM
AORTIC VALVE MEAN VELOCITY: 83.9 CM/S
ASCENDING AORTA: 3.5 CM
AV DIMENSIONLESS INDEX VTI: 0.8
AV MEAN GRADIENT: 3 MMHG
AV PEAK GRADIENT: 5.8 MMHG
AV VALVE AREA: 2.9 CM2
BSA FOR ECHO PROCEDURE: 2.19 M2
CV BLOOD PRESSURE: ABNORMAL MMHG
CV ECHO HEIGHT: 61.3 IN
CV ECHO WEIGHT: 246 LBS
DOP CALC AO PEAK VEL: 120 CM/S
DOP CALC AO VTI: 26.2 CM
DOP CALC LVOT AREA: 3.46 CM2
DOP CALC LVOT DIAMETER: 2.1 CM
DOP CALC LVOT STROKE VOLUME: 76.2 CM3
DOP CALC MV VTI: 19.1 CM
DOP CALCLVOT PEAK VEL VTI: 22 CM
EJECTION FRACTION: 60 % (ref 55–75)
FRACTIONAL SHORTENING: 25.6 % (ref 28–44)
INTERVENTRICULAR SEPTUM IN END DIASTOLE: 1.3 CM (ref 0.6–0.9)
IVS/PW RATIO: 0.8
LA AREA 1: 14.7 CM2
LA AREA 2: 16.5 CM2
LEFT ATRIUM LENGTH: 4.69 CM
LEFT ATRIUM SIZE: 4.4 CM
LEFT ATRIUM TO AORTIC ROOT RATIO: 1.38 NO UNITS
LEFT ATRIUM VOLUME INDEX: 20.1 ML/M2
LEFT ATRIUM VOLUME: 44 ML
LEFT VENTRICLE CARDIAC INDEX: 2 L/MIN/M2
LEFT VENTRICLE CARDIAC OUTPUT: 4.4 L/MIN
LEFT VENTRICLE DIASTOLIC VOLUME INDEX: 31.1 CM3/M2 (ref 29–61)
LEFT VENTRICLE DIASTOLIC VOLUME: 68 CM3 (ref 46–106)
LEFT VENTRICLE HEART RATE: 58 BPM
LEFT VENTRICLE MASS INDEX: 97.2 G/M2
LEFT VENTRICLE SYSTOLIC VOLUME INDEX: 12.3 CM3/M2 (ref 8–24)
LEFT VENTRICLE SYSTOLIC VOLUME: 27 CM3 (ref 14–42)
LEFT VENTRICULAR INTERNAL DIMENSION IN DIASTOLE: 3.9 CM (ref 3.8–5.2)
LEFT VENTRICULAR INTERNAL DIMENSION IN SYSTOLE: 2.9 CM (ref 2.2–3.5)
LEFT VENTRICULAR MASS: 212.9 G
LEFT VENTRICULAR OUTFLOW TRACT MEAN GRADIENT: 2 MMHG
LEFT VENTRICULAR OUTFLOW TRACT MEAN VELOCITY: 59 CM/S
LEFT VENTRICULAR POSTERIOR WALL IN END DIASTOLE: 1.6 CM (ref 0.6–0.9)
LV STROKE VOLUME INDEX: 34.8 ML/M2
MITRAL VALVE E/A RATIO: 0.9
MITRAL VALVE MEAN INFLOW VELOCITY: 40 CM/S
MITRAL VALVE PEAK VELOCITY: 66.9 CM/S
MV AREA VTI: 3.99 CM2
MV AVERAGE E/E' RATIO: 10.8 CM/S
MV DECELERATION TIME: 254 MS
MV E'TISSUE VEL-LAT: 6.32 CM/S
MV E'TISSUE VEL-MED: 3.9 CM/S
MV LATERAL E/E' RATIO: 8.8
MV MEAN GRADIENT: 1 MMHG
MV MEDIAL E/E' RATIO: 14.2
MV PEAK A VELOCITY: 62.2 CM/S
MV PEAK E VELOCITY: 55.3 CM/S
MV PEAK GRADIENT: 1.8 MMHG
MV VALVE AREA BY CONTINUITY EQUATION: 4 CM2
NUC REST DIASTOLIC VOLUME INDEX: 3936 LBS
NUC REST SYSTOLIC VOLUME INDEX: 61.25 IN
TRICUSPID VALVE ANULAR PLANE SYSTOLIC EXCURSION: 1.6 CM

## 2020-11-04 ASSESSMENT — MIFFLIN-ST. JEOR: SCORE: 1567.19

## 2020-11-11 ENCOUNTER — OFFICE VISIT - HEALTHEAST (OUTPATIENT)
Dept: FAMILY MEDICINE | Facility: CLINIC | Age: 70
End: 2020-11-11

## 2020-11-11 DIAGNOSIS — H26.9 CATARACT OF BOTH EYES, UNSPECIFIED CATARACT TYPE: ICD-10-CM

## 2020-11-11 DIAGNOSIS — Z01.818 PREOP GENERAL PHYSICAL EXAM: ICD-10-CM

## 2020-11-11 DIAGNOSIS — Z71.89 COUNSELING REGARDING ADVANCED DIRECTIVES: ICD-10-CM

## 2020-11-11 ASSESSMENT — MIFFLIN-ST. JEOR: SCORE: 1586.47

## 2020-11-15 ENCOUNTER — COMMUNICATION - HEALTHEAST (OUTPATIENT)
Dept: CARDIOLOGY | Facility: CLINIC | Age: 70
End: 2020-11-15

## 2020-11-15 DIAGNOSIS — I25.10 CAD (CORONARY ARTERY DISEASE): ICD-10-CM

## 2020-11-15 DIAGNOSIS — E78.5 DYSLIPIDEMIA, GOAL LDL BELOW 70: ICD-10-CM

## 2020-11-16 ENCOUNTER — COMMUNICATION - HEALTHEAST (OUTPATIENT)
Dept: CARDIOLOGY | Facility: CLINIC | Age: 70
End: 2020-11-16

## 2020-11-16 DIAGNOSIS — Z95.1 S/P CABG X 3: ICD-10-CM

## 2020-11-16 RX ORDER — METOPROLOL TARTRATE 25 MG/1
25 TABLET, FILM COATED ORAL 2 TIMES DAILY
Qty: 180 TABLET | Refills: 2 | Status: SHIPPED | OUTPATIENT
Start: 2020-11-16 | End: 2021-09-28

## 2020-11-16 RX ORDER — EZETIMIBE 10 MG/1
TABLET ORAL
Qty: 90 TABLET | Refills: 2 | Status: SHIPPED | OUTPATIENT
Start: 2020-11-16 | End: 2021-09-28

## 2020-12-21 ENCOUNTER — COMMUNICATION - HEALTHEAST (OUTPATIENT)
Dept: CARDIOLOGY | Facility: CLINIC | Age: 70
End: 2020-12-21

## 2020-12-21 DIAGNOSIS — I25.10 CORONARY ARTERY DISEASE DUE TO CALCIFIED CORONARY LESION: ICD-10-CM

## 2020-12-21 DIAGNOSIS — I25.84 CORONARY ARTERY DISEASE DUE TO CALCIFIED CORONARY LESION: ICD-10-CM

## 2021-01-09 ENCOUNTER — COMMUNICATION - HEALTHEAST (OUTPATIENT)
Dept: FAMILY MEDICINE | Facility: CLINIC | Age: 71
End: 2021-01-09

## 2021-01-09 DIAGNOSIS — K21.9 GASTROESOPHAGEAL REFLUX DISEASE WITHOUT ESOPHAGITIS: ICD-10-CM

## 2021-01-10 RX ORDER — FAMOTIDINE 40 MG/1
40 TABLET, FILM COATED ORAL DAILY
Qty: 90 TABLET | Refills: 2 | Status: SHIPPED | OUTPATIENT
Start: 2021-01-10 | End: 2021-10-05

## 2021-03-08 ENCOUNTER — AMBULATORY - HEALTHEAST (OUTPATIENT)
Dept: NURSING | Facility: CLINIC | Age: 71
End: 2021-03-08

## 2021-03-29 ENCOUNTER — AMBULATORY - HEALTHEAST (OUTPATIENT)
Dept: NURSING | Facility: CLINIC | Age: 71
End: 2021-03-29

## 2021-05-07 ENCOUNTER — COMMUNICATION - HEALTHEAST (OUTPATIENT)
Dept: CARDIOLOGY | Facility: CLINIC | Age: 71
End: 2021-05-07

## 2021-05-07 DIAGNOSIS — I25.84 CORONARY ARTERY DISEASE DUE TO CALCIFIED CORONARY LESION: ICD-10-CM

## 2021-05-07 DIAGNOSIS — I25.10 CORONARY ARTERY DISEASE DUE TO CALCIFIED CORONARY LESION: ICD-10-CM

## 2021-05-07 RX ORDER — ROSUVASTATIN CALCIUM 40 MG/1
40 TABLET, COATED ORAL AT BEDTIME
Qty: 90 TABLET | Refills: 1 | Status: SHIPPED | OUTPATIENT
Start: 2021-05-07 | End: 2021-09-30

## 2021-05-26 ENCOUNTER — RECORDS - HEALTHEAST (OUTPATIENT)
Dept: ADMINISTRATIVE | Facility: CLINIC | Age: 71
End: 2021-05-26

## 2021-05-27 NOTE — PATIENT INSTRUCTIONS - HE
Schedule OTC sinus medication, tylenol, and antihistamine (claritin) for the next several days.   May also continue with throat lozenges, frequent sips of water, and tea with honey to soothe throat.

## 2021-05-27 NOTE — TELEPHONE ENCOUNTER
Due to be seen    Rx renewed per Medication Renewal Policy. Medication was last renewed on 6/18/18.    Oumou Burrlel, [facility] Triage/Med Refill 4/15/2019     Requested Prescriptions   Pending Prescriptions Disp Refills     metoprolol tartrate (LOPRESSOR) 25 MG tablet [Pharmacy Med Name: METOPROLOL TARTRATE 25 MG TAB] 180 tablet 2     Sig: TAKE 1 TABLET BY MOUTH TWICE A DAY       Beta-Blockers Refill Protocol Passed - 4/13/2019  8:39 AM        Passed - PCP or prescribing provider visit in past 12 months or next 3 months     Last office visit with prescriber/PCP: 4/2/2018 Jared Patel MD OR same dept: Visit date not found OR same specialty: 5/24/2018 Vargas Carson MD  Last physical: 4/26/2018 Last MTM visit: Visit date not found   Next visit within 3 mo: Visit date not found  Next physical within 3 mo: Visit date not found  Prescriber OR PCP: Jared Patel MD  Last diagnosis associated with med order: 1. S/P CABG x 3  - metoprolol tartrate (LOPRESSOR) 25 MG tablet [Pharmacy Med Name: METOPROLOL TARTRATE 25 MG TAB]; TAKE 1 TABLET BY MOUTH TWICE A DAY  Dispense: 180 tablet; Refill: 2    If protocol passes may refill for 12 months if within 3 months of last provider visit (or a total of 15 months).             Passed - Blood pressure filed in past 12 months     BP Readings from Last 1 Encounters:   10/12/18 120/74

## 2021-05-27 NOTE — PROGRESS NOTES
[facility] Clinic Note    Colleen Samaniego  1950   [id number]    Colleen Samaniego is a 69 y.o. female presenting to discuss the following:     CC:   Chief Complaint   Patient presents with     Sore Throat       HPI:    SORE THROAT-   A few weeks ago, thought she had strep throat, was raw, hurt bad. Woke up Sunday morning. 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 Since then, kept taking throat lozenges. Less painful, but feels like she has a persistent lump, hurts to swallow coarse foods (hamburger, etc). Doesn't hurt to eat salad or yogurts. Also using OTC sinus medication PRN, helps just a little.     Was placed on warfarin post-operatively to prevent recurrent DVT/PE.     HEALTH MAINTENANCE:   - DXA: thinks may have had one  - Shingrix: hasn't had, wonders about insurance coverage    ROS:   CONSTITUTIONAL: Has chills, no real fevers. Appetite comes and goes. Is more fatigued and weak.   HEENT: Rhinorrhea present, whole head feels full.   HEART: occasional chest pain at baseline.   LUNGS: A little cough and chest congestion.   ABDOMEN: No abdominal pain.     PMH:   Patient Active Problem List   Diagnosis     Rheumatic Fever     Atypical chest pain     Hyperlipidemia     Obstructive sleep apnea     Acute Pharyngitis     Feelings Of Urinary Urgency     Hypercholesterolemia     Rheumatic fever     S/P knee replacement     Acute pulmonary embolism- moderate bilateral     Acute respiratory failure with hypoxia (H)     Left leg DVT (H)     Acute blood loss anemia     Coronary artery disease due to calcified coronary lesion     History of pulmonary embolism     CAD (coronary artery disease)     S/P CABG x 3     History of DVT (deep vein thrombosis)     Thrombocytopenia (H)     GERD (gastroesophageal reflux disease)     Exertional angina (H)     Hypercholesteremia     Hx of CABG     S/P total knee replacement using cement, right       Past Medical History:   Diagnosis Date      Arthritis      Coronary artery disease      DVT (deep venous thrombosis) (H)      Family history of myocardial infarction      GERD (gastroesophageal reflux disease)      High cholesterol      History of anesthesia complications     slow to wake up     History of blood clots      Hypercholesterolemia      Left leg DVT (H) 2015    Postop Left TKA     PE (pulmonary thromboembolism) (H)      Pulmonary embolism, bilateral (H) 2015    Postop Left TKA     Rheumatic fever          Sleep apnea, obstructive     uses CPAP       PSH:   Past Surgical History:   Procedure Laterality Date     APPENDECTOMY       BREAST CYST ASPIRATION       CARDIAC CATHETERIZATION      prior to CABG     CARDIAC CATHETERIZATION  2017    no intervention      SECTION       x2     CORONARY ARTERY BYPASS GRAFT N/A 2016    Procedure: CORONARY ARTERY BYPASS GRAFT X 3, ENDOSCOPIC VEIN HARVEST, INTERNAL MAMMARY ARTERY, ANESTHESIA TRANSESOPHAGEAL ECHOCARDIOGRAM ;  Surgeon: Rancho Rodas MD;  Location: [facility];  Service:      HYSTERECTOMY       JOINT REPLACEMENT      left knee     OOPHORECTOMY       AR CATH PLACEMENT & NJX CORONARY ART ANGIO IMG S&I N/A 2017    Procedure: Coronary Angiogram;  Surgeon: David Youngblood MD;  Location: [facility] Cath Lab;  Service: Cardiology     AR CATH PLMT L HRT & ARTS W/NJX & ANGIO IMG S&I Left 2017    Procedure: Left Heart Catheterization Without Left Ventriculogram;  Surgeon: David Youngblood MD;  Location: [facility] Cath Lab;  Service: Cardiology     AR TOTAL ABDOM HYSTERECTOMY      Description: Total Abdominal Hysterectomy;  Recorded: 2010;     AR TOTAL KNEE ARTHROPLASTY Left 2015    Procedure: LEFT TOTAL KNEE ARTHROPLASTY;  Surgeon: Good Suggs MD;  Location: [facility];  Service: Orthopedics     AR TOTAL KNEE ARTHROPLASTY Right 2018    Procedure:  RIGHT TOTAL KNEE ARTHROPLASTY COMPLEX BMI 46;  Surgeon: Good Suggs  "MD;  Location: [facility] OR;  Service: Orthopedics     WRIST SURGERY Right          MEDICATIONS:   Current Outpatient Medications on File Prior to Visit   Medication Sig Dispense Refill     aspirin 81 MG EC tablet Take 81 mg by mouth daily.       calcium carbonate (OS-ALECIA) 600 mg (1,500 mg) tablet Take 600 mg by mouth daily with lunch.        ezetimibe (ZETIA) 10 mg tablet Take 1 tablet (10 mg total) by mouth daily. 90 tablet 3     metoprolol tartrate (LOPRESSOR) 25 MG tablet Take 1 tablet (25 mg total) by mouth 2 (two) times a day. 180 tablet 0     multivitamin with minerals (THERA-M) 9 mg iron-400 mcg Tab tablet Take 1 tablet by mouth daily.       omeprazole (PRILOSEC) 20 MG capsule Take 1 capsule (20 mg total) by mouth every other day. (Patient taking differently: Take 20 mg by mouth every other day. Takes on odd days of month) 45 capsule 3     rosuvastatin (CRESTOR) 40 MG tablet TAKE 1 TABLET (40 MG TOTAL) BY MOUTH AT BEDTIME. 90 tablet 1     acetaminophen (TYLENOL) 500 MG tablet Take 2 tablets (1,000 mg total) by mouth 3 (three) times a day.  0     [DISCONTINUED] senna-docusate (PERICOLACE) 8.6-50 mg tablet Take 1 tablet by mouth 2 (two) times a day.  0     [DISCONTINUED] warfarin sodium (WARFARIN DAILY DOSE) Take by mouth daily with supper. 5/14/18 INR 1.3 7mg today. Next in am 5/15. Cont Lovenox  5/12/18 INR 1.2 3mg daily.  5/11/18 had 2mg  5/8/18 5mg daily.       No current facility-administered medications on file prior to visit.        ALLERGIES:  Allergies   Allergen Reactions     Aleve [Naproxen Sodium]      Causes acid reflux     Amoxicillin Hives     Atorvastatin      Annotation: myalgia       Ibuprofen Swelling     Face swelled and became red/ blotchy.  Can take aspirin without reaction     Simvastatin      Didn't work for her.  Did not lower cholesterol much       PHYSICAL EXAM:   /82   Pulse 64   Temp 97.9  F (36.6  C) (Oral)   Resp 20   Ht 5' 1.5\" (1.562 m)   Wt (!) 245 lb 4 oz " (111.2 kg)   BMI 45.59 kg/m     GENERAL: Colleen is a pleasant, elderly female, in no acute distress.   HEENT: Sclera white, no eye discharge, no nasal discharge, nasal turbinates are pink and not edematous, tympanic membranes are dull and retracted bilaterally, no tonsillar hypertrophy, posterior pharynx is pink, no cervical lymphadenopathy.   HEART: Regular rate and rhythm, no murmurs.  LUNGS: Clear to auscultation bilaterally, unlabored.     ASSESSMENT & PLAN:   Colleen Samaniego is a 69 y.o. female presenting today for evaluation of sore throat.    1. Viral URI  Colleen had negative rapid strep at onset of symptoms. Symptoms are consistent with viral URI with post-nasal drainage irritating throat and stimulating cough. She does not want to use intranasal medications.     Recommended the following:     Schedule OTC sinus medication, tylenol, and antihistamine (claritin) for the next several days.     May also continue with throat lozenges, frequent sips of water, and tea with honey to soothe throat.      HM: Will look into Dxa scan and insurance coverage for Shingrix.     RTC: 2-4 weeks - annual physical exam     Susan Maddox DO

## 2021-05-28 NOTE — PROGRESS NOTES
Assessment and Plan:     Colleen Samaniego is a 69 y.o. female presenting today for annual wellness exam.    1. Encounter for Medicare annual wellness exam  2. Routine general medical examination at a health care facility  Normal mini cog screening.  Up-to-date on immunizations except for Shingrix.  She will complete this at the pharmacy.  For DXA screening per below.  Provided patient information on identified health risks per below.    3. Fatigue, unspecified type  4. History of anemia  - HM2(CBC w/o Differential)  - Thyroid Cascade    History of anemia, hemoglobin has not yet been rechecked.  Additionally, with changing nails, fatigue, and obesity will also screen for thyroid abnormality.      5. S/P CABG x 3  6. Coronary artery disease due to calcified coronary lesion  7. Medication management  - Comprehensive Metabolic Panel  - Lipid Cascade FASTING    Follows with cardiology.  Full lipids last checked in April 2018.  Will recheck today.  She is on Zetia, will also check LFTs.    8. Postmenopausal status  - DXA Bone Density Scan; Future     No previous DEXA scans in chart.  This factor includes previous smoking history and age.    9. Hx Sleep Apnea  - Referral sleep medicine    Will refer back to sleep medicine to reassess sleep settings.     The patient's current medical problems were reviewed.    I have had an Advance Directives discussion with the patient.  The following are part of a depression follow up plan for the patient:  mental edgardo screening education  The following high BMI interventions were performed this visit: encouragement to exercise, exercise promotion: walking/step counting and lifestyle education regarding diet  The following health maintenance schedule was reviewed with the patient and provided in printed form in the after visit summary:   Health Maintenance   Topic Date Due     DXA SCAN  01/05/2014     ZOSTER VACCINES (2 of 2) 09/09/2015     FALL RISK ASSESSMENT  04/17/2020     ADVANCE  DIRECTIVES DISCUSSED WITH PATIENT  07/15/2020     MAMMOGRAM  10/12/2020     TD 18+ HE  04/26/2028     COLONOSCOPY  12/10/2028     PNEUMOCOCCAL POLYSACCHARIDE VACCINE AGE 65 AND OVER  Completed     INFLUENZA VACCINE RULE BASED  Completed     PNEUMOCOCCAL CONJUGATE VACCINE FOR ADULTS (PCV13 OR PREVNAR)  Completed        Subjective:   Chief Complaint: Colleen Samaniego is an 69 y.o. female here for an Annual Wellness visit.     HPI:  Here today for annual wellness exam. Sore throat still bothersome, but is improving, Claritin helpful with congestion.     Does leak urine, more stress induced, some sense of urgency but usually able to make it to the bathroom in time.     Mildly positive PHQ2, PHQ9 8/27. Mood fluctuates, doesn't stay low, is still going through normal day routine, doesn't feel she needs help in this area at this time.     Does not yet have a living will/advanced directive completed. Has the paperwork at home. Is talking with her sons about her wishes.     Having some thinning of her nails. Bowels are regular.     Is using CPAP machine for sleep apnea. Was previously seen at [facility]. Was told she could bring in equipment, but that place no longer exists. Nobody has looked it over since 2012. Sometimes will question how well it is working.     Still having occasional reflux symptoms. Using omeprazole every other day. Uses Tums as needed.     Will do shingles shot at pharmacy.     Following up with cardiology in October. Sees Dr. Lima. Felt a little short of breath and chest pain with raking, is stable.     Review of Systems:  Please see above.  The rest of the review of systems are negative for all systems.    Patient Care Team:  Jared Patel MD as PCP - General     Patient Active Problem List   Diagnosis     Rheumatic Fever     Atypical chest pain     Hyperlipidemia     Obstructive sleep apnea     Acute Pharyngitis     Feelings Of Urinary Urgency     Hypercholesterolemia     Rheumatic fever      S/P knee replacement     Acute pulmonary embolism- moderate bilateral     Acute respiratory failure with hypoxia (H)     Left leg DVT (H)     Acute blood loss anemia     Coronary artery disease due to calcified coronary lesion     History of pulmonary embolism     CAD (coronary artery disease)     S/P CABG x 3     History of DVT (deep vein thrombosis)     Thrombocytopenia (H)     GERD (gastroesophageal reflux disease)     Exertional angina (H)     Hypercholesteremia     Hx of CABG     S/P total knee replacement using cement, right     Past Medical History:   Diagnosis Date     Arthritis      Coronary artery disease      DVT (deep venous thrombosis) (H)      Family history of myocardial infarction      GERD (gastroesophageal reflux disease)      High cholesterol      History of anesthesia complications     slow to wake up     History of blood clots      Hypercholesterolemia      Left leg DVT (H) 2015    Postop Left TKA     PE (pulmonary thromboembolism) (H)      Pulmonary embolism, bilateral (H) 2015    Postop Left TKA     Rheumatic fever          Sleep apnea, obstructive     uses CPAP      Past Surgical History:   Procedure Laterality Date     APPENDECTOMY       BREAST CYST ASPIRATION       CARDIAC CATHETERIZATION      prior to CABG     CARDIAC CATHETERIZATION  2017    no intervention      SECTION       x2     CORONARY ARTERY BYPASS GRAFT N/A 2016    Procedure: CORONARY ARTERY BYPASS GRAFT X 3, ENDOSCOPIC VEIN HARVEST, INTERNAL MAMMARY ARTERY, ANESTHESIA TRANSESOPHAGEAL ECHOCARDIOGRAM ;  Surgeon: Rancho Rodas MD;  Location: [facility] Main OR;  Service:      HYSTERECTOMY       JOINT REPLACEMENT      left knee     OOPHORECTOMY       TX CATH PLACEMENT & NJX CORONARY ART ANGIO IMG S&I N/A 2017    Procedure: Coronary Angiogram;  Surgeon: David Youngblood MD;  Location: [facility] Cath Lab;  Service: Cardiology     TX CATH PLMT L HRT & ARTS W/NJX & ANGIO IMG S&I Left  2017    Procedure: Left Heart Catheterization Without Left Ventriculogram;  Surgeon: David Youngblood MD;  Location: [facility] Cath Lab;  Service: Cardiology     HI TOTAL ABDOM HYSTERECTOMY      Description: Total Abdominal Hysterectomy;  Recorded: 2010;     HI TOTAL KNEE ARTHROPLASTY Left 2015    Procedure: LEFT TOTAL KNEE ARTHROPLASTY;  Surgeon: Good Suggs MD;  Location: [facility];  Service: Orthopedics     HI TOTAL KNEE ARTHROPLASTY Right 2018    Procedure:  RIGHT TOTAL KNEE ARTHROPLASTY COMPLEX BMI 46;  Surgeon: Good Suggs MD;  Location: [facility];  Service: Orthopedics     WRIST SURGERY Right       Family History   Problem Relation Age of Onset     Hypertension Mother      Heart disease Mother      Heart disease Father      Stroke Sister      Heart disease Sister      Clotting disorder Sister      Factor V Leiden deficiency Sister      Clotting disorder Brother      Factor V Leiden deficiency Brother      Anesthesia problems Neg Hx      Breast cancer Neg Hx       Social History     Socioeconomic History     Marital status:    Tobacco Use     Smoking status: Former Smoker     Last attempt to quit: 2001     Years since quittin.4     Smokeless tobacco: Never Used      Current Outpatient Medications   Medication Sig Dispense Refill     aspirin 81 MG EC tablet Take 81 mg by mouth daily.       calcium carbonate (OS-ALECIA) 600 mg (1,500 mg) tablet Take 600 mg by mouth daily with lunch.        ezetimibe (ZETIA) 10 mg tablet Take 1 tablet (10 mg total) by mouth daily. 90 tablet 3     metoprolol tartrate (LOPRESSOR) 25 MG tablet Take 1 tablet (25 mg total) by mouth 2 (two) times a day. 180 tablet 0     multivitamin with minerals (THERA-M) 9 mg iron-400 mcg Tab tablet Take 1 tablet by mouth daily.       omeprazole (PRILOSEC) 20 MG capsule Take 1 capsule (20 mg total) by mouth every other day. (Patient taking differently: Take 20 mg by mouth every other  "day. Takes on odd days of month) 45 capsule 3     rosuvastatin (CRESTOR) 40 MG tablet TAKE 1 TABLET (40 MG TOTAL) BY MOUTH AT BEDTIME. 90 tablet 1     acetaminophen (TYLENOL) 500 MG tablet Take 2 tablets (1,000 mg total) by mouth 3 (three) times a day.  0     No current facility-administered medications for this visit.       Objective:   Vital Signs:   Visit Vitals  /76   Pulse 64   Temp 98  F (36.7  C) (Oral)   Resp 16   Ht 5' 1.25\" (1.556 m)   Wt (!) 245 lb 7 oz (111.3 kg)   BMI 46.00 kg/m         VisionScreening: Not done, goes to the eye doctor.     PHYSICAL EXAM  GENERAL: Colleen is a pleasant, obese female in no acute distress.  HEENT: Sclera white, no nasal discharge, no cervical lymphadenopathy, no thyromegaly  HEART: Regular rate and rhythm, no murmurs  LUNGS: Clear to auscultation bilaterally, unlabored  ABDOMEN: Soft, non-tender to palpation   EXTREMITIES: Normal capillary refill, no lower extremity edema     Assessment Results 5/1/2019   Activities of Daily Living No help needed   Instrumental Activities of Daily Living No help needed   Mini Cog Total Score 5   Some recent data might be hidden     A Mini-Cog score of 0-2 suggests the possibility of dementia, score of 3-5 suggests no dementia    Identified Health Risks:       She is at risk for lack of exercise and has been provided with information to increase physical activity for the benefit of her well-being.    The patient was counseled and encouraged to consider modifying their diet and eating habits. She was provided with information on recommended healthy diet options.    The patient was provided with written information regarding signs of hearing loss.    Information on urinary incontinence and treatment options given to patient.    The patient was provided with suggestions to help her develop a healthy emotional lifestyle.     She is at risk for falling and has been provided with information to reduce the risk of falling at " home.    Information regarding advance directives (living hoff[place]), including where she can download the appropriate form, was provided to the patient via the AVS.

## 2021-05-28 NOTE — TELEPHONE ENCOUNTER
Spoke to pt. Dr. Maddox's message relayed to pt.  Phone number ([phone]) given to pt for [facility].

## 2021-05-28 NOTE — TELEPHONE ENCOUNTER
----- Message from Susan Maddox DO sent at 5/1/2019 12:39 PM CDT -----  Please call Colleen. I have placed a referral to the sleep medicine physicians to further evaluate her CPAP settings as it has been a while since she has seen her sleep physician.  Susan Maddox DO

## 2021-05-28 NOTE — PROGRESS NOTES
Labs have returned. Patient notified by [employer]. Mild normocytic anemia with Hgb 11.9, but improved from last check. LDL has decreased from 104 to 77 with combination of Zetia and Crestor. Triglycerides continue to be mildly elevated at 171. Normal thyroid and CMP.     The 10-year ASCVD risk score (Lazaro ROE Jr., et al., 2013) is: 9.5%    Values used to calculate the score:      Age: 69 years      Sex: Female      Is Non- : No      Diabetic: No      Tobacco smoker: No      Systolic Blood Pressure: 112 mmHg      Is BP treated: Yes      HDL Cholesterol: 43 mg/dL      Total Cholesterol: 154 mg/dL

## 2021-05-28 NOTE — PROGRESS NOTES
DXA scan reveals osteoporosis, T-score spine -2.8. Patient notified of results by [employer], recommend returning to clinic to discuss treatment options.   Susan Maddox, DO

## 2021-05-28 NOTE — TELEPHONE ENCOUNTER
RN cannot approve Refill Request    RN can NOT refill this medication PCP messaged that patient is overdue for Office Visit. Last office visit: 4/2/2018 Jared Patel MD Last Physical: 4/26/2018 Last MTM visit: Visit date not found Last visit same specialty: 4/17/2019 Susan Maddox DO.  Next visit within 3 mo: Visit date not found  Next physical within 3 mo: Visit date not found      Shae Doshi, [facility] Connection Triage/Med Refill 5/6/2019    Requested Prescriptions   Pending Prescriptions Disp Refills     omeprazole (PRILOSEC) 20 MG capsule [Pharmacy Med Name: OMEPRAZOLE DR 20 MG CAPSULE] 45 capsule 3     Sig: TAKE 1 CAPSULE (20 MG TOTAL) BY MOUTH EVERY OTHER DAY.       GI Medications Refill Protocol Failed - 5/6/2019  1:41 AM        Failed - PCP or prescribing provider visit in last 12 or next 3 months.     Last office visit with prescriber/PCP: 4/2/2018 Jared Patel MD OR same dept: 4/17/2019 Susan Maddox DO OR same specialty: 4/17/2019 Susan Maddox DO  Last physical: 4/26/2018 Last MTM visit: Visit date not found   Next visit within 3 mo: Visit date not found  Next physical within 3 mo: Visit date not found  Prescriber OR PCP: Jared Patel MD  Last diagnosis associated with med order: 1. Gastritis  - omeprazole (PRILOSEC) 20 MG capsule [Pharmacy Med Name: OMEPRAZOLE DR 20 MG CAPSULE]; Take 1 capsule (20 mg total) by mouth every other day.  Dispense: 45 capsule; Refill: 3    If protocol passes may refill for 12 months if within 3 months of last provider visit (or a total of 15 months).

## 2021-05-29 NOTE — PROGRESS NOTES
Called Colleen today to further discuss her decision for treatment options of osteoporosis.  She is agreeable to trial Fosamax.  Will monitor for recurrence of reflux symptoms.  If she does not tolerate medication, will discuss alternatives.    1. Age-related osteoporosis without current pathological fracture  - alendronate (FOSAMAX) 70 MG tablet; Take 1 tablet (70 mg total) by mouth every 7 days. Take in the morning on an empty stomach with a full glass of water 30 minutes before food  Dispense: 4 tablet; Refill: 11    Susan Maddox DO

## 2021-05-29 NOTE — PROGRESS NOTES
Labs look good. Phosphorus just a touch low, but isolated finding. Patient updated by MyChart.  Susan Maddox, DO 05/23/19

## 2021-05-29 NOTE — PROGRESS NOTES
[facility] Clinic Note    Colleen Samaniego  1950   [id number]    Colleen Samaniego is a 69 y.o. female presenting to discuss the following:     CC:   Chief Complaint   Patient presents with     Test Results     DEXA       HPI:  Colleen is here today to follow-up on osteoporosis findings with most recent DEXA scan. She is taking calcium and vitamin D supplements currently.  She eats yogurt, cottage cheese, and milk in her coffee and cereal.  She has remote smoking history, quit in 2001. She denies any back pain.     ROS:   See HPI above.     PMH:   Patient Active Problem List   Diagnosis     History of rheumatic fever     Obstructive sleep apnea     Feelings Of Urinary Urgency     S/P knee replacement     Left leg DVT (H)     Coronary artery disease due to calcified coronary lesion     History of pulmonary embolism     S/P CABG x 3     History of DVT (deep vein thrombosis)     GERD (gastroesophageal reflux disease)     Exertional angina (H)     Hypercholesteremia       Past Medical History:   Diagnosis Date     Acute blood loss anemia 9/11/2015     Acute pulmonary embolism- moderate bilateral 9/11/2015     Acute respiratory failure with hypoxia (H) 9/11/2015     Arthritis      Coronary artery disease      DVT (deep venous thrombosis) (H)      Family history of myocardial infarction      GERD (gastroesophageal reflux disease)      High cholesterol      History of anesthesia complications     slow to wake up     History of blood clots      Hypercholesterolemia      Left leg DVT (H) 9/2015    Postop Left TKA     PE (pulmonary thromboembolism) (H)      Pulmonary embolism, bilateral (H) 9/2015    Postop Left TKA     Rheumatic fever     1953     Sleep apnea, obstructive     uses CPAP     Thrombocytopenia (H) 5/5/2016       PSH:   Past Surgical History:   Procedure Laterality Date     APPENDECTOMY       BREAST CYST ASPIRATION       CARDIAC CATHETERIZATION  2016    prior to CABG     CARDIAC CATHETERIZATION  05/24/2017    no  intervention      SECTION       x2     CORONARY ARTERY BYPASS GRAFT N/A 2016    Procedure: CORONARY ARTERY BYPASS GRAFT X 3, ENDOSCOPIC VEIN HARVEST, INTERNAL MAMMARY ARTERY, ANESTHESIA TRANSESOPHAGEAL ECHOCARDIOGRAM ;  Surgeon: Rancho Rodas MD;  Location: [facility] OR;  Service:      HYSTERECTOMY       JOINT REPLACEMENT      left knee     OOPHORECTOMY       TX CATH PLACEMENT & NJX CORONARY ART ANGIO IMG S&I N/A 2017    Procedure: Coronary Angiogram;  Surgeon: David Youngblood MD;  Location: [facility] Cath Lab;  Service: Cardiology     TX CATH PLMT L HRT & ARTS W/NJX & ANGIO IMG S&I Left 2017    Procedure: Left Heart Catheterization Without Left Ventriculogram;  Surgeon: David Youngblood MD;  Location: [facility] Lab;  Service: Cardiology     TX TOTAL ABDOM HYSTERECTOMY      Description: Total Abdominal Hysterectomy;  Recorded: 2010;     TX TOTAL KNEE ARTHROPLASTY Left 2015    Procedure: LEFT TOTAL KNEE ARTHROPLASTY;  Surgeon: Good Suggs MD;  Location: [facility];  Service: Orthopedics     TX TOTAL KNEE ARTHROPLASTY Right 2018    Procedure:  RIGHT TOTAL KNEE ARTHROPLASTY COMPLEX BMI 46;  Surgeon: Good Suggs MD;  Location: [facility];  Service: Orthopedics     WRIST SURGERY Right          MEDICATIONS:   Current Outpatient Medications on File Prior to Visit   Medication Sig Dispense Refill     acetaminophen (TYLENOL) 500 MG tablet Take 2 tablets (1,000 mg total) by mouth 3 (three) times a day. (Patient taking differently: Take 1,000 mg by mouth as needed.       )  0     aspirin 81 MG EC tablet Take 81 mg by mouth daily.       calcium carbonate (OS-ALECIA) 600 mg (1,500 mg) tablet Take 600 mg by mouth daily with lunch.        ezetimibe (ZETIA) 10 mg tablet Take 1 tablet (10 mg total) by mouth daily. 90 tablet 3     metoprolol tartrate (LOPRESSOR) 25 MG tablet Take 1 tablet (25 mg total) by mouth 2 (two) times a day. 180 tablet  "0     multivitamin with minerals (THERA-M) 9 mg iron-400 mcg Tab tablet Take 1 tablet by mouth daily.       omeprazole (PRILOSEC) 20 MG capsule TAKE 1 CAPSULE (20 MG TOTAL) BY MOUTH EVERY OTHER DAY. 45 capsule 3     rosuvastatin (CRESTOR) 40 MG tablet TAKE 1 TABLET (40 MG TOTAL) BY MOUTH AT BEDTIME. 90 tablet 1     [DISCONTINUED] rosuvastatin (CRESTOR) 40 MG tablet Take 1 tablet (40 mg total) by mouth every evening. 90 tablet 0     No current facility-administered medications on file prior to visit.        ALLERGIES:  Allergies   Allergen Reactions     Aleve [Naproxen Sodium]      Causes acid reflux     Amoxicillin Hives     Atorvastatin      Annotation: myalgia       Ibuprofen Swelling     Face swelled and became red/ blotchy.  Can take aspirin without reaction     Simvastatin      Didn't work for her.  Did not lower cholesterol much         PHYSICAL EXAM:   /70   Pulse 80   Temp 98.2  F (36.8  C) (Oral)   Resp 16   Ht 5' 1.25\" (1.556 m)   Wt (!) 242 lb 4 oz (109.9 kg)   BMI 45.40 kg/m     GENERAL: Colleen is a pleasant, obese female, in no acute distress.   HEART: Regular rate and rhythm, no murmurs.  LUNGS:  Clear to auscultation bilaterally, unlabored.   MSK: No gross deformities.     ASSESSMENT & PLAN:   Colleen Samaniego is a 69 y.o. female presenting today for follow-up DXA results.     1. Age-related osteoporosis without current pathological fracture  - Phosphorus  - Vitamin D, Total (25-Hydroxy)    DEXA scan shows spine T score -2.8 and hip T score -1.1 and -1.3.  FRAX score for overall osteoporosis fracture risk is 7.9% and hip fracture 0.9%.  We discussed by FRAX score alone, she does not need treatment, but this does not factor in her lumbar spine risk.  Lumbar spine T score is consistent with osteoporosis.    Discussed concern for possible compression fractures and hip fractures with thinning bones and subsequent morbidity mortality risk.    Recommended she continue with calcium and vitamin D " supplementation.  She currently receives adequate calcium intake between diet and supplements.  She should increase her total vitamin D supplementation.  I recommended 1000 to 2000 international units daily.    We discussed risks versus benefits of osteoporosis treatment with bisphosphonates.  I provided some patient information on bisphosphonates and will check back with Colleen in 1 week to discuss what she would like to do.    Additionally, discussed work-up for secondary causes of osteoporosis.  She had lab work done in early May a few weeks ago with mild normocytic anemia otherwise normal CBC, normal CMP occluding alk phos, and normal TSH.  I recommended we check a phosphorus today and vitamin D level.    2. Normocytic anemia  - Hemoglobin     Incidental finding normocytic anemia early May, recheck today actually shows hemoglobin is 12.6 and resolved.    RTC: May 2020 - annual wellness exam / sooner if issues  Will check in by phone to discuss treatment options for osteoporosis after she has had some time to review information from today's visit.    Susan Maddox, DO

## 2021-05-30 VITALS — WEIGHT: 234 LBS | HEIGHT: 62 IN | BODY MASS INDEX: 43.06 KG/M2

## 2021-05-30 VITALS — BODY MASS INDEX: 43.06 KG/M2 | HEIGHT: 62 IN | WEIGHT: 234 LBS

## 2021-05-30 NOTE — PROGRESS NOTES
[place]  [address] [place] [address]    Thank you for the opportunity to participate in the care of Ms. Colleen Samaniego.    She is a 69 y.o. female who comes to the clinic for the transfer of care of her obstructive sleep apnea.  The patient was diagnosed with mild obstructive sleep apnea on 10/2/2012 here in our facility with an apnea hypopnea index of 10.5 events per hour.  The patient has been on CPAP therapy since that time.  She feels that her current humidification unit is not working as well as it should.  She also would like to establish care to get access to our durable medical equipment company to explore the different mask options.  The patient's review of system is otherwise negative.     Ideal Sleep-Wake Cycle(devoid of societal pressure):    Patient would try to initiate sleep at around 9 PM with a sleep latency of 15-30 minutes. The patient would have 3-4 awakening. Final wake up time is around 4-6 AM.    Past Medical History  Past Medical History:   Diagnosis Date     Acute blood loss anemia 9/11/2015     Acute pulmonary embolism- moderate bilateral 9/11/2015     Acute respiratory failure with hypoxia (H) 9/11/2015     Arthritis      Coronary artery disease      DVT (deep venous thrombosis) (H)      Family history of myocardial infarction      GERD (gastroesophageal reflux disease)      High cholesterol      History of anesthesia complications     slow to wake up     History of blood clots      Hypercholesterolemia      Left leg DVT (H) 9/2015    Postop Left TKA     PE (pulmonary thromboembolism) (H)      Pulmonary embolism, bilateral (H) 9/2015    Postop Left TKA     Rheumatic fever     1953     Sleep apnea, obstructive     uses CPAP     Thrombocytopenia (H) 5/5/2016        Past Surgical History  Past Surgical History:   Procedure Laterality Date     APPENDECTOMY       BREAST CYST ASPIRATION       CARDIAC CATHETERIZATION  2016    prior to CABG     CARDIAC CATHETERIZATION   2017    no intervention      SECTION       x2     CORONARY ARTERY BYPASS GRAFT N/A 2016    Procedure: CORONARY ARTERY BYPASS GRAFT X 3, ENDOSCOPIC VEIN HARVEST, INTERNAL MAMMARY ARTERY, ANESTHESIA TRANSESOPHAGEAL ECHOCARDIOGRAM ;  Surgeon: Rancho Rodas MD;  Location: [facility] OR;  Service:      HYSTERECTOMY       JOINT REPLACEMENT      left knee     OOPHORECTOMY       [place] CATH PLACEMENT & NJX CORONARY ART ANGIO IMG S&I N/A 2017    Procedure: Coronary Angiogram;  Surgeon: David Youngblood MD;  Location: [facility] Cath Lab;  Service: Cardiology     [place] CATH PLMT L HRT & ARTS W/NJX & ANGIO IMG S&I Left 2017    Procedure: Left Heart Catheterization Without Left Ventriculogram;  Surgeon: David Youngblood MD;  Location: [facility] Cath Lab;  Service: Cardiology     [place] TOTAL ABDOM HYSTERECTOMY      Description: Total Abdominal Hysterectomy;  Recorded: 2010;     [place] TOTAL KNEE ARTHROPLASTY Left 2015    Procedure: LEFT TOTAL KNEE ARTHROPLASTY;  Surgeon: Good Suggs MD;  Location: [facility];  Service: Orthopedics     [place] TOTAL KNEE ARTHROPLASTY Right 2018    Procedure:  RIGHT TOTAL KNEE ARTHROPLASTY COMPLEX BMI 46;  Surgeon: Good Suggs MD;  Location: [facility];  Service: Orthopedics     WRIST SURGERY Right         Meds  Current Outpatient Medications   Medication Sig Dispense Refill     acetaminophen (TYLENOL) 500 MG tablet Take 2 tablets (1,000 mg total) by mouth 3 (three) times a day. (Patient taking differently: Take 1,000 mg by mouth as needed.       )  0     alendronate (FOSAMAX) 70 MG tablet Take 1 tablet (70 mg total) by mouth every 7 days. Take in the morning on an empty stomach with a full glass of water 30 minutes before food 4 tablet 11     aspirin 81 MG EC tablet Take 81 mg by mouth daily.       calcium carbonate (OS-ALECIA) 600 mg (1,500 mg) tablet Take 600 mg by mouth daily with lunch.        ezetimibe (ZETIA) 10 mg  tablet Take 1 tablet (10 mg total) by mouth daily. 90 tablet 3     metoprolol tartrate (LOPRESSOR) 25 MG tablet Take 1 tablet (25 mg total) by mouth 2 (two) times a day. 180 tablet 1     multivitamin with minerals (THERA-M) 9 mg iron-400 mcg Tab tablet Take 1 tablet by mouth daily.       omeprazole (PRILOSEC) 20 MG capsule TAKE 1 CAPSULE (20 MG TOTAL) BY MOUTH EVERY OTHER DAY. 45 capsule 3     rosuvastatin (CRESTOR) 40 MG tablet TAKE 1 TABLET (40 MG TOTAL) BY MOUTH AT BEDTIME. 90 tablet 1     No current facility-administered medications for this visit.         Allergies  Aleve [naproxen sodium]; Amoxicillin; Atorvastatin; Ibuprofen; and Simvastatin     Social History  Social History     Socioeconomic History     Marital status:      Spouse name: Not on file     Number of children: Not on file     Years of education: Not on file     Highest education level: Not on file   Occupational History     Not on file   Social Needs     Financial resource strain: Not on file     Food insecurity:     Worry: Not on file     Inability: Not on file     Transportation needs:     Medical: Not on file     Non-medical: Not on file   Tobacco Use     Smoking status: Former Smoker     Last attempt to quit: 2001     Years since quittin.6     Smokeless tobacco: Never Used   Substance and Sexual Activity     Alcohol use: No     Drug use: No     Sexual activity: Not on file   Lifestyle     Physical activity:     Days per week: Not on file     Minutes per session: Not on file     Stress: Not on file   Relationships     Social connections:     Talks on phone: Not on file     Gets together: Not on file     Attends Orthodoxy service: Not on file     Active member of club or organization: Not on file     Attends meetings of clubs or organizations: Not on file     Relationship status: Not on file     Intimate partner violence:     Fear of current or ex partner: Not on file     Emotionally abused: Not on file     Physically  abused: Not on file     Forced sexual activity: Not on file   Other Topics Concern     Not on file   Social History Narrative     Not on file        Family History  Family History   Problem Relation Age of Onset     Hypertension Mother      Heart disease Mother      Heart disease Father      Stroke Sister      Heart disease Sister      Clotting disorder Sister      Factor V Leiden deficiency Sister      Clotting disorder Brother      Factor V Leiden deficiency Brother      Anesthesia problems Neg Hx      Breast cancer Neg Hx         Review of Systems:  Constitutional: Negative except as noted in HPI.   Eyes: Negative except as noted in HPI.   ENT: Negative except as noted in HPI.   Cardiovascular: Negative except as noted in HPI.   Respiratory: Negative except as noted in HPI.   Gastrointestinal: Negative except as noted in HPI.   Genitourinary: Negative except as noted in HPI.   Musculoskeletal: Negative except as noted in HPI.   Integumentary: Negative except as noted in HPI.   Neurological: Negative except as noted in HPI.   Psychiatric: Negative except as noted in HPI.   Endocrine: Negative except as noted in HPI.   Hematologic/Lymphatic: Negative except as noted in HPI.      STOP BANG 7/17/2019   Do you snore loudly (louder than talking or loud enough to be heard through closed doors)? 1   Do you often feel tired, fatigued, or sleepy during daytime? 1   Has anyone observed you stop breathing in your sleep? 1   Do you have or are you being treated for high blood pressure? 0   BMI more than 35 kg/m2 1   Age over 50 years old? 1   Neck circumference greater than 16 inches? 1   Gender male? 0   Total Score 6     Epworths Sleepiness Scale 7/17/2019   Sitting and reading 1   Watching TV 1   Sitting, inactive in a public place (e.g. a theatre or a meeting) 0   As a passenger in a car for an hour without a break 0   Lying down to rest in the afternoon when circumstances permit 1   Sitting and talking to someone 0  "  Sitting quietly after a lunch without alcohol 1   In a car, while stopped for a few minutes in traffic 0   Total score 4     Rooming 7/17/2019   Usual bedtime 9pm   Sleep Latency 15-30 minutes   Awakenings 3-4 times   Wake Up Time 4-6am   Weekends same   Energy Drinks 0   Coffee 3 cups qd   Cola 0   Difficulty falling asleep No   Difficulty staying asleep Yes   Excessive daytime sleepiness Yes   Shift Worker No   Sleep Walking? No   Sleep Talking? No   Kicking or punching? No   Restless legs symptoms Yes       Physical Exam:  /80   Pulse 68   Ht 5' 1.25\" (1.556 m)   Wt (!) 250 lb (113.4 kg)   SpO2 96%   BMI 46.85 kg/m    BMI:Body mass index is 46.85 kg/m .   GEN: NAD, morbidly obese  Head: Normocephalic.  EYES: PERRLA, EOMI  ENT: Oropharynx is clear, Thompson class 4+ airway.   Nasal mucosa is moist without erythema  Neck : Thyroid is within normal limits. Neck Circ 16.25\"  CV: Regular rate and rhythm, S1 & S2 positive.  LUNGS: Bilateral breathsounds heard.   ABDOMEN: Positive bowel sounds in all quadrants, soft, no rebound or guarding  MUSCULOSKELETAL: Bilateral trace leg swelling  SKIN: warm, dry, no rashes  Neurological: Alert, oriented to time, place, and person.  Psych: normal mood, normal affect     Labs/Studies:     Lab Results   Component Value Date    WBC 6.2 05/01/2019    HGB 12.6 05/21/2019    HCT 35.8 05/01/2019    MCV 92 05/01/2019     05/01/2019         Chemistry        Component Value Date/Time     05/01/2019 0804    K 4.3 05/01/2019 0804     (H) 05/01/2019 0804    CO2 20 (L) 05/01/2019 0804    BUN 24 05/01/2019 0804    CREATININE 0.90 05/01/2019 0804    GLU 99 05/01/2019 0804        Component Value Date/Time    CALCIUM 9.6 05/01/2019 0804    ALKPHOS 82 05/01/2019 0804    AST 23 05/01/2019 0804    ALT 32 05/01/2019 0804    BILITOT 0.8 05/01/2019 0804            No results found for: FERRITIN  Lab Results   Component Value Date    TSH 1.89 05/01/2019         Assessment " and Plan:  In summary Colleen Samaniego is a 69 y.o. year old female here for establishing care.  1.  Obstructive sleep apnea  Since we already have the patient's sleep study here in our charts, there is no need for us to repeat the sleep study.  I will however have the patient sign a transfer durable medical equipment form so she can get access to her durable medical equipment company to look at different mask options and to interrogate her humidification unit.  I welcomed the patient to follow-up with me in 2 years.  2.  Other sleep disturbance  3.  Morbid obesity    Patient verbalized understanding of these issues, agrees with the plan and all questions were answered today. Patient was given an opportuntity to voice any other symptoms or concerns not listed above. Patient did not have any other symptoms or concerns.       Link Torres DO  Board Certified in Internal Medicine and Sleep Medicine  [facility].    (Note created with Dragon voice recognition and unintended spelling errors and word substitutions may occur)

## 2021-05-30 NOTE — PROGRESS NOTES
Order for Durable Medical Equipment was processed and equipment ordered.     DME provider: [facility]    Date Faxed: 7/17/2019    Ordering Provider: Link Torres DO    PAP Order Type: JOHN/Mask/Suplies    Fax Number: IN HOUSE DME: [facility]

## 2021-05-31 VITALS — BODY MASS INDEX: 43.63 KG/M2 | WEIGHT: 231.1 LBS | HEIGHT: 61 IN

## 2021-05-31 VITALS — BODY MASS INDEX: 43.8 KG/M2 | WEIGHT: 232 LBS | HEIGHT: 61 IN

## 2021-05-31 VITALS — BODY MASS INDEX: 43.43 KG/M2 | WEIGHT: 230 LBS | HEIGHT: 61 IN

## 2021-05-31 VITALS — BODY MASS INDEX: 43.43 KG/M2 | HEIGHT: 61 IN | WEIGHT: 230 LBS

## 2021-05-31 VITALS — WEIGHT: 229 LBS | BODY MASS INDEX: 43.27 KG/M2

## 2021-06-01 VITALS — BODY MASS INDEX: 45.73 KG/M2 | WEIGHT: 242 LBS

## 2021-06-01 VITALS — HEIGHT: 61 IN | BODY MASS INDEX: 45.69 KG/M2 | WEIGHT: 242 LBS

## 2021-06-01 VITALS — HEIGHT: 61 IN | BODY MASS INDEX: 45.08 KG/M2 | WEIGHT: 238.75 LBS

## 2021-06-01 VITALS — BODY MASS INDEX: 44.74 KG/M2 | WEIGHT: 236.8 LBS

## 2021-06-01 VITALS — HEIGHT: 61 IN | BODY MASS INDEX: 45.5 KG/M2 | WEIGHT: 241 LBS

## 2021-06-02 VITALS — HEIGHT: 62 IN | WEIGHT: 243 LBS | BODY MASS INDEX: 44.72 KG/M2

## 2021-06-02 VITALS — WEIGHT: 245.25 LBS | BODY MASS INDEX: 45.13 KG/M2 | HEIGHT: 62 IN

## 2021-06-02 NOTE — PATIENT INSTRUCTIONS - HE
Colleen Samaniego,    It was a pleasure to see you today at the [facility].     My recommendations after this visit include:    Pulmonary function test  Blood work  mariel Lima MD, FACC, VINAY

## 2021-06-02 NOTE — PROGRESS NOTES
Cpft with pre and post spirometry done using albuterol 2.5 mg neb. Ats standards met, patient stacie well, results scanned to patients chart.

## 2021-06-02 NOTE — PROGRESS NOTES
"Cardiology Progress Note    Assessment:  Coronary artery disease status post coronary artery bypass graft surgery in 2016, stable  Chronic  incisional chest discomfort with stable sternum/normal CT  Chronic exertional dyspnea, multifactorial, suspect combination of deconditioning and obesity playing a major role  Hypercholesterolemia, on Crestor, suboptimal control in the past  Obesity  History of DVT and PE, on chronic anticoagulation  DJD right knee, status post replacement      Plan:  Pulmonary function tests  Sniff test to rule out diaphragmatic paralysis  BNP  May need to repeat echo to reassess pulmonary pressures  vis-à-vis history of DVT and PE    Routine follow-up in 1 year    Subjective:   This is 69 y.o. female who comes in today for follow-up visit.  She continues to complain of exertional dyspnea.  She denies PND and orthopnea.  She has had a weight gain in spite of trying to eat sensibly.   She has not had chest pains or heart palpitations    Review of Systems:   General: WNL  Eyes: Visual Distubance  Ears/Nose/Throat: Hearing Loss  Lungs: Cough, Shortness of Breath, Snoring  Heart: Shortness of Breath with activity  Stomach: WNL  Bladder: WNL  Muscle/Joints: Joint Pain, Muscle Weakness, Muscle Pain  Skin: WNL  Nervous System: Daytime Sleepiness  Mental Health: Depression     Blood: Easy Bruising    Objective:   /88 (Patient Site: Left Arm, Patient Position: Sitting, Cuff Size: Adult Regular)   Pulse 64   Resp 20   Ht 5' 1.25\" (1.556 m)   Wt (!) 250 lb (113.4 kg)   SpO2 95%   BMI 46.85 kg/m    Physical Exam:  GENERAL: no distress  NECK: No JVD  LUNGS: Clear to auscultation.  CARDIAC: regular rhythm, S1 & S2 normal.  No heaves, thrills, gallops or murmurs.  ABDOMEN: flat, negative hepatosplenomegaly, soft and non-tender.  EXTREMITIES: No evidence of cyanosis, clubbing or edema.    Current Outpatient Medications   Medication Sig Dispense Refill     acetaminophen (TYLENOL) 500 MG tablet Take 2 " tablets (1,000 mg total) by mouth 3 (three) times a day. (Patient taking differently: Take 1,000 mg by mouth as needed.       )  0     alendronate (FOSAMAX) 70 MG tablet Take 1 tablet (70 mg total) by mouth every 7 days. Take in the morning on an empty stomach with a full glass of water 30 minutes before food 4 tablet 11     aspirin 81 MG EC tablet Take 81 mg by mouth daily.       calcium carbonate (OS-ALECIA) 600 mg (1,500 mg) tablet Take 600 mg by mouth daily with lunch.        cholecalciferol, vitamin D3, 1,000 unit (25 mcg) tablet Take 1,000 Units by mouth 2 (two) times a day.       ezetimibe (ZETIA) 10 mg tablet Take 1 tablet (10 mg total) by mouth daily. 90 tablet 3     metoprolol tartrate (LOPRESSOR) 25 MG tablet Take 1 tablet (25 mg total) by mouth 2 (two) times a day. 180 tablet 1     multivitamin with minerals (THERA-M) 9 mg iron-400 mcg Tab tablet Take 1 tablet by mouth daily.       omeprazole (PRILOSEC) 20 MG capsule TAKE 1 CAPSULE (20 MG TOTAL) BY MOUTH EVERY OTHER DAY. 45 capsule 3     rosuvastatin (CRESTOR) 40 MG tablet TAKE 1 TABLET (40 MG TOTAL) BY MOUTH AT BEDTIME. 90 tablet 1     No current facility-administered medications for this visit.        Cardiographics:    EC2018 read by me sinus rhythm incomplete right bundle branch block no changes from 2017     Coronary angio: May 2017      LM mild dz  LAD severe dz mid with patent LIMA, small diagonal  Circ severe dz in OM with patnet SVG  RCA severe dz in mid with patent SVG  LVEDP 8mmHg  Aortic root mildly enlarged       Patient began having severe chest pain when laying flat, worse with direct pressure over sternal incision site.      Imp/plan:  1. Chest pain - musculoskeletal -related to either incisino or to statin - schedule CTnon contrast of sternum, follow up with CT surgery in clinic, hold statin for four weeks and rechalleng  2. CAD s/p CABG 3 vessel all grafts patent      Echo: 2016  Normal left ventricular size and systolic  function.   Left ventricular ejection fraction is visually estimated to be 65%.   No significant valvular abnormalities.      Holter: July 2017  The predominant rhythm is sinus rhythm which is present throughout the monitoring period.  Rates range from 52 bpm to 160 bpm.  There were no significant pauses or bradycardia.      Only for atrial premature beats were present over 24 hours.    No ventricular beats were present.      Symptoms:  Symptoms of Dizziness were associated with Sinus rhythm 83 bpm.  Symptoms of shortness of breath were associated with sinus tachycardia rate 104 and 128 bpm.  Symptoms of chest pressure was associated with sinus rhythm rate 91 bpm, all without ectopy    Lab Results:       Lab Results   Component Value Date    CHOL 154 05/01/2019    CHOL 184 04/02/2018    CHOL 217 (H) 10/09/2017     Lab Results   Component Value Date    HDL 43 (L) 05/01/2019    HDL 45 (L) 04/02/2018    HDL 42 (L) 10/09/2017     Lab Results   Component Value Date    LDLCALC 77 05/01/2019    LDLCALC 104 04/02/2018    LDLCALC 135 (H) 10/09/2017     Lab Results   Component Value Date    TRIG 171 (H) 05/01/2019    TRIG 177 (H) 04/02/2018    TRIG 202 (H) 10/09/2017     BNP   Date Value Ref Range Status   05/02/2017 42 0 - 114 pg/mL Final       Kyler (Devan)  MD Janie

## 2021-06-03 VITALS
HEIGHT: 61 IN | DIASTOLIC BLOOD PRESSURE: 88 MMHG | HEART RATE: 64 BPM | RESPIRATION RATE: 20 BRPM | OXYGEN SATURATION: 95 % | BODY MASS INDEX: 47.2 KG/M2 | WEIGHT: 250 LBS | SYSTOLIC BLOOD PRESSURE: 130 MMHG

## 2021-06-03 VITALS — WEIGHT: 250 LBS | BODY MASS INDEX: 47.2 KG/M2 | HEIGHT: 61 IN

## 2021-06-03 VITALS — BODY MASS INDEX: 46.34 KG/M2 | WEIGHT: 245.44 LBS | HEIGHT: 61 IN

## 2021-06-03 VITALS — BODY MASS INDEX: 45.74 KG/M2 | HEIGHT: 61 IN | WEIGHT: 242.25 LBS

## 2021-06-04 VITALS
HEIGHT: 61 IN | BODY MASS INDEX: 47.23 KG/M2 | SYSTOLIC BLOOD PRESSURE: 139 MMHG | DIASTOLIC BLOOD PRESSURE: 76 MMHG | TEMPERATURE: 98.3 F | WEIGHT: 250.13 LBS | HEART RATE: 59 BPM

## 2021-06-04 VITALS
DIASTOLIC BLOOD PRESSURE: 70 MMHG | SYSTOLIC BLOOD PRESSURE: 126 MMHG | TEMPERATURE: 98 F | OXYGEN SATURATION: 97 % | WEIGHT: 251.7 LBS | HEART RATE: 63 BPM | BODY MASS INDEX: 47.17 KG/M2 | RESPIRATION RATE: 16 BRPM

## 2021-06-04 VITALS
DIASTOLIC BLOOD PRESSURE: 60 MMHG | WEIGHT: 249.7 LBS | BODY MASS INDEX: 46.8 KG/M2 | SYSTOLIC BLOOD PRESSURE: 132 MMHG | RESPIRATION RATE: 20 BRPM | HEART RATE: 76 BPM

## 2021-06-04 VITALS
DIASTOLIC BLOOD PRESSURE: 70 MMHG | RESPIRATION RATE: 16 BRPM | HEIGHT: 61 IN | HEART RATE: 72 BPM | SYSTOLIC BLOOD PRESSURE: 138 MMHG | BODY MASS INDEX: 47.39 KG/M2 | WEIGHT: 251 LBS

## 2021-06-05 VITALS
WEIGHT: 246 LBS | RESPIRATION RATE: 12 BRPM | SYSTOLIC BLOOD PRESSURE: 128 MMHG | HEIGHT: 61 IN | BODY MASS INDEX: 46.44 KG/M2 | HEART RATE: 65 BPM | DIASTOLIC BLOOD PRESSURE: 68 MMHG

## 2021-06-05 VITALS
BODY MASS INDEX: 47.25 KG/M2 | WEIGHT: 250.25 LBS | SYSTOLIC BLOOD PRESSURE: 118 MMHG | HEIGHT: 61 IN | HEART RATE: 70 BPM | DIASTOLIC BLOOD PRESSURE: 59 MMHG

## 2021-06-05 VITALS — BODY MASS INDEX: 46.44 KG/M2 | WEIGHT: 246 LBS | HEIGHT: 61 IN

## 2021-06-05 NOTE — TELEPHONE ENCOUNTER
Spoke with staff at [place] [facility] [phone] for tier exception for Metoprolol tartr. Gave diag codes and no other meds used. Case #[id number]. They will fax outcome.

## 2021-06-05 NOTE — PROGRESS NOTES
"[facility] Note    Name: Colleen Samaniego  : 1950   MRN: [id number]    Colleen Samaniego is a 70 y.o. female presenting to discuss the following:     CC:   Chief Complaint   Patient presents with     Cold Symptoms     Had a cold before Thanks which seemed to go away.  1 week after Thanks she got really sick again.  Symptoms seem to get better but then get worse again.  Pt complains coughin, wheezing, sneezing, fatigue, headaches & body aches.     HPI:  Symptoms like the cold that won't go away. Mainly centered in head and chest. Symptoms fluctuate depending on day. Gets better, but then if has to go outside to shovel, \"knocks her over again\". Coughing, sneezing, rhinorrhea, congestion, achy joints, fatigued all the time. Feeling like she runs a marathon after shoveling, etc.     Has used cough drops. Was using vitamin C supplement. Taking tylenol for headaches and aches.     ROS:   CONSTITUTIONAL: Appetite so so. Energy is poor.   HEENT: See above.   HEART: Having some pain in chest, mostly with exertion, feels like has elephant sitting on chest, hard to take a deep breath. Symptoms lasting at least an hour to 90 minutes. Always with exertion. Sometimes feels like heart is racing. Talked with cardiologist in October. Last holter monitor 2017, sinus.    LUNGS: Dyspnea with exertion, wheezing, history of tobacco use (quit ). No hemoptysis. Pleuritic chest pain.  ABDOMEN: No abdominal pain.     PMH:   Patient Active Problem List   Diagnosis     History of rheumatic fever     Obstructive sleep apnea     Feelings Of Urinary Urgency     S/P knee replacement     Left leg DVT (H)     Coronary artery disease due to calcified coronary lesion     History of pulmonary embolism     S/P CABG x 3     History of DVT (deep vein thrombosis)     GERD (gastroesophageal reflux disease)     Exertional angina (H)     Hypercholesteremia     Age-related osteoporosis without current pathological fracture     " Morbid obesity (H)     Dyspnea on exertion       Past Medical History:   Diagnosis Date     Acute blood loss anemia 2015     Acute pulmonary embolism- moderate bilateral 2015     Acute respiratory failure with hypoxia (H) 2015     Arthritis      Coronary artery disease      DVT (deep venous thrombosis) (H)      Family history of myocardial infarction      GERD (gastroesophageal reflux disease)      High cholesterol      History of anesthesia complications     slow to wake up     History of blood clots      Hypercholesterolemia      Left leg DVT (H) 2015    Postop Left TKA     PE (pulmonary thromboembolism) (H)      Pulmonary embolism, bilateral (H) 2015    Postop Left TKA     Rheumatic fever          Sleep apnea, obstructive     uses CPAP     Thrombocytopenia (H) 2016       PSH:   Past Surgical History:   Procedure Laterality Date     APPENDECTOMY       BREAST CYST ASPIRATION       CARDIAC CATHETERIZATION      prior to CABG     CARDIAC CATHETERIZATION  2017    no intervention      SECTION       x2     CORONARY ARTERY BYPASS GRAFT N/A 2016    Procedure: CORONARY ARTERY BYPASS GRAFT X 3, ENDOSCOPIC VEIN HARVEST, INTERNAL MAMMARY ARTERY, ANESTHESIA TRANSESOPHAGEAL ECHOCARDIOGRAM ;  Surgeon: Rancho Rodas MD;  Location: [facility];  Service:      HYSTERECTOMY       JOINT REPLACEMENT      left knee     OOPHORECTOMY       MO CATH PLACEMENT & NJX CORONARY ART ANGIO IMG S&I N/A 2017    Procedure: Coronary Angiogram;  Surgeon: David Youngblood MD;  Location: [facility] Cath Lab;  Service: Cardiology     MO CATH PLMT L HRT & ARTS W/NJX & ANGIO IMG S&I Left 2017    Procedure: Left Heart Catheterization Without Left Ventriculogram;  Surgeon: David Youngblood MD;  Location: [facility] Cath Lab;  Service: Cardiology     MO TOTAL ABDOM HYSTERECTOMY      Description: Total Abdominal Hysterectomy;  Recorded: 2010;     MO TOTAL KNEE  ARTHROPLASTY Left 9/8/2015    Procedure: LEFT TOTAL KNEE ARTHROPLASTY;  Surgeon: Good Suggs MD;  Location: [facility] OR;  Service: Orthopedics     PA TOTAL KNEE ARTHROPLASTY Right 5/8/2018    Procedure:  RIGHT TOTAL KNEE ARTHROPLASTY COMPLEX BMI 46;  Surgeon: Good Suggs MD;  Location: [facility];  Service: Orthopedics     WRIST SURGERY Right      MEDICATIONS:   Current Outpatient Medications on File Prior to Visit   Medication Sig Dispense Refill     acetaminophen (TYLENOL) 500 MG tablet Take 2 tablets (1,000 mg total) by mouth 3 (three) times a day. (Patient taking differently: Take 1,000 mg by mouth as needed.       )  0     alendronate (FOSAMAX) 70 MG tablet Take 1 tablet (70 mg total) by mouth every 7 days. Take in the morning on an empty stomach with a full glass of water 30 minutes before food 4 tablet 11     aspirin 81 MG EC tablet Take 81 mg by mouth daily.       calcium carbonate (OS-ALECIA) 600 mg (1,500 mg) tablet Take 600 mg by mouth daily with lunch.        cholecalciferol, vitamin D3, 1,000 unit (25 mcg) tablet Take 1,000 Units by mouth 2 (two) times a day.       ezetimibe (ZETIA) 10 mg tablet Take 1 tablet (10 mg total) by mouth daily. 90 tablet 3     metoprolol tartrate (LOPRESSOR) 25 MG tablet TAKE 1 TABLET BY MOUTH TWICE A  tablet 1     multivitamin with minerals (THERA-M) 9 mg iron-400 mcg Tab tablet Take 1 tablet by mouth daily.       omeprazole (PRILOSEC) 20 MG capsule TAKE 1 CAPSULE (20 MG TOTAL) BY MOUTH EVERY OTHER DAY. 45 capsule 3     rosuvastatin (CRESTOR) 40 MG tablet TAKE 1 TABLET (40 MG TOTAL) BY MOUTH AT BEDTIME. 90 tablet 1     ezetimibe (ZETIA) 10 mg tablet TAKE 1 TABLET BY MOUTH EVERY DAY 90 tablet 3     No current facility-administered medications on file prior to visit.      ALLERGIES:  Allergies   Allergen Reactions     Aleve [Naproxen Sodium]      Causes acid reflux     Amoxicillin Hives     Atorvastatin      Annotation: myalgia       Ibuprofen Swelling      Face swelled and became red/ blotchy.  Can take aspirin without reaction     Simvastatin      Didn't work for her.  Did not lower cholesterol much     SOCIAL HISTORY:   Social History     Tobacco Use     Smoking status: Former Smoker     Last attempt to quit: 2001     Years since quittin.1     Smokeless tobacco: Never Used   Substance Use Topics     Alcohol use: No     Drug use: No     PHYSICAL EXAM:   /70   Pulse 63   Temp 98  F (36.7  C) (Oral)   Resp 16   Wt (!) 251 lb 11.2 oz (114.2 kg)   SpO2 97%   BMI 47.17 kg/m     GENERAL: Colleen is a pleasant, morbidly obese female in no acute distress.   HEENT: Sclera white, no significant nasal congestion or pain with palpation of sinuses, tympanic membranes normal bilaterally, oropharynx pink and moist, no cervical lymphadenopathy.   HEART: Regular rate and rhythm, no murmurs.   LUNGS: Clear to auscultation bilaterally, unlabored.   ABDOMEN: Soft, non-tender to palpation.    LABS:   Recent Results (from the past 24 hour(s))   HM2(CBC w/o Differential)   Result Value Ref Range    WBC 5.8 4.0 - 11.0 thou/uL    RBC 4.24 3.80 - 5.40 mill/uL    Hemoglobin 13.1 12.0 - 16.0 g/dL    Hematocrit 39.5 35.0 - 47.0 %    MCV 93 80 - 100 fL    MCH 30.9 27.0 - 34.0 pg    MCHC 33.2 32.0 - 36.0 g/dL    RDW 11.9 11.0 - 14.5 %    Platelets 237 140 - 440 thou/uL    MPV 6.8 (L) 7.0 - 10.0 fL      IMAGING:   CXR: Per my review, no focal infiltrates, effusions, or masses. Awaiting review by radiology.    ASSESSMENT & PLAN:   Colleen Samaniego is a 70 y.o. female presenting today for evaluation of intermittent respiratory symptoms and dyspnea on exertion with exertional chest pain, worsening over the last 2-3 months. Last saw cardiology on October. Had PFTs and sniff test completed and were normal. Last angiogram May 2017 with severe 3 vessel disease, s/p CABG, all 3 vessel grafts were patent at time of last imaging. Last pharmacologic stress test was 2016, stress test  was negative.     1. Shortness of breath  2. Bronchitis  - HM2(CBC w/o Differential)  - XR Chest 2 Views  - azithromycin (ZITHROMAX Z-DARRELL) 250 MG tablet; Take 2 tablets (500 mg) on  Day 1,  followed by 1 tablet (250 mg) once daily on Days 2 through 5.  Dispense: 6 tablet; Refill: 0     Due to history of anemia and shortness of breath, recommend repeat CBC to rule out recurrence. Hemoglobin normal today. CXR obtained to rule out effusions, infiltrates, or mass. Per my review no abnormalities identified. Awaiting formal review by cardiology. Opted to treat for bronchitis due to history of tobacco use (despite recenty normal PFTs) and duration of symptoms. If symptoms are not improving, return for further evaluation.     I am concerned given history of pulmonary embolism and dypsnea with exertion but no other symptoms of pulmonary embolism, modified wells criteria low risk, and symptoms are different compared to previous DVT/PE experience. No lower extremity symptoms to suggest DVT. Previous DVT/PE was provoked after a procedure. Given duration of symptoms over several months, will not pursue evaluation today as would anticipate acute symptoms with DVT/PE.     If symptoms are not improving with antibiotic treatment, would recommend follow up with heart doctor, especially in setting of chest pain with exertion relieved by rest. I am concerned about possibility of progressive ischemic heart disease.     RTC: May 2020 - medicare annual wellness exam / sooner as needed    Susan Maddox,

## 2021-06-05 NOTE — TELEPHONE ENCOUNTER
Prior Authorization Request    Who s requesting:  [facility]     Pharmacy Name and Location:   [id number] [place] [address]    Medication Name:   omeprazole (PRILOSEC) 20 MG capsule 45 capsule 3 5/6/2019     Sig - Route: TAKE 1 CAPSULE (20 MG TOTAL) BY MOUTH EVERY OTHER DAY        Insurance Plan: [facility]    Insurance Member ID Number:  [id number]    Please call:  [facility] 6  Option #2  for tier exception  Ref# [id number]    CoverMyMeds Key: N/A  Informed patient that prior authorizations can take up to 10 business days for response:   Yes  Okay to leave a detailed message: Yes

## 2021-06-05 NOTE — TELEPHONE ENCOUNTER
[facility] PA team  [phone]  Pool: HE PA MED ([id number])          PA has been initiated.       PA form completed and faxed insurance via Cover My Meds     Key:  [id number]     Medication:  Omeprazole     Insurance:  [employer]        Response will be received via fax and may take up to 5-10 business days depending on plan

## 2021-06-05 NOTE — PROGRESS NOTES
[facility] Note    Name: Colleen Samaniego  : 1950   MRN: [id number]    Colleen Samaniego is a 70 y.o. female presenting to discuss the following:     CC:   Chief Complaint   Patient presents with     Medication Management     Discuss acid reflux medication     Paperwork     Disability parking permit     HPI:  Reflux medications: Chronic reflux symptoms, symptoms are worsening when not taking omeprazole regularly. Needing breakthrough medications when not taking regularly. Symptoms well controlled with PPI. Denies any history of hiatal hernia, gastric ulcer, hematemesis, dark tarry stools, or weight loss. Has not had EGD in the past.    Disability paperwork: Needs renewal of her parking permit. Has disability parking permit due to severe knee osteoarthritis.     ROS:   See HPI above.     PMH:   Patient Active Problem List   Diagnosis     History of rheumatic fever     Obstructive sleep apnea     Feelings Of Urinary Urgency     S/P knee replacement     Left leg DVT (H)     Coronary artery disease due to calcified coronary lesion     History of pulmonary embolism     S/P CABG x 3     History of DVT (deep vein thrombosis)     GERD (gastroesophageal reflux disease)     Exertional angina (H)     Hypercholesteremia     Age-related osteoporosis without current pathological fracture     Morbid obesity (H)     Dyspnea on exertion       Past Medical History:   Diagnosis Date     Acute blood loss anemia 2015     Acute pulmonary embolism- moderate bilateral 2015     Acute respiratory failure with hypoxia (H) 2015     Arthritis      Coronary artery disease      DVT (deep venous thrombosis) (H)      Family history of myocardial infarction      GERD (gastroesophageal reflux disease)      High cholesterol      History of anesthesia complications     slow to wake up     History of blood clots      Hypercholesterolemia      Left leg DVT (H) 2015    Postop Left TKA     PE (pulmonary thromboembolism) (H)       Pulmonary embolism, bilateral (H) 2015    Postop Left TKA     Rheumatic fever          Sleep apnea, obstructive     uses CPAP     Thrombocytopenia (H) 2016       PSH:   Past Surgical History:   Procedure Laterality Date     APPENDECTOMY       BREAST CYST ASPIRATION       CARDIAC CATHETERIZATION      prior to CABG     CARDIAC CATHETERIZATION  2017    no intervention      SECTION       x2     CORONARY ARTERY BYPASS GRAFT N/A 2016    Procedure: CORONARY ARTERY BYPASS GRAFT X 3, ENDOSCOPIC VEIN HARVEST, INTERNAL MAMMARY ARTERY, ANESTHESIA TRANSESOPHAGEAL ECHOCARDIOGRAM ;  Surgeon: Rancho Rodas MD;  Location: [facility] OR;  Service:      HYSTERECTOMY       JOINT REPLACEMENT      left knee     OOPHORECTOMY       ND CATH PLACEMENT & NJX CORONARY ART ANGIO IMG S&I N/A 2017    Procedure: Coronary Angiogram;  Surgeon: David Youngblood MD;  Location: [facility] Cath Lab;  Service: Cardiology     ND CATH PLMT L HRT & ARTS W/NJX & ANGIO IMG S&I Left 2017    Procedure: Left Heart Catheterization Without Left Ventriculogram;  Surgeon: David Youngblood MD;  Location: [facility] Cath Lab;  Service: Cardiology     ND TOTAL ABDOM HYSTERECTOMY      Description: Total Abdominal Hysterectomy;  Recorded: 2010;     ND TOTAL KNEE ARTHROPLASTY Left 2015    Procedure: LEFT TOTAL KNEE ARTHROPLASTY;  Surgeon: Good Suggs MD;  Location: [facility];  Service: Orthopedics     ND TOTAL KNEE ARTHROPLASTY Right 2018    Procedure:  RIGHT TOTAL KNEE ARTHROPLASTY COMPLEX BMI 46;  Surgeon: Good Suggs MD;  Location: [facility];  Service: Orthopedics     WRIST SURGERY Right      MEDICATIONS:   Current Outpatient Medications on File Prior to Visit   Medication Sig Dispense Refill     acetaminophen (TYLENOL) 500 MG tablet Take 2 tablets (1,000 mg total) by mouth 3 (three) times a day. (Patient taking differently: Take 1,000 mg by mouth as needed.        )  0     alendronate (FOSAMAX) 70 MG tablet Take 1 tablet (70 mg total) by mouth every 7 days. Take in the morning on an empty stomach with a full glass of water 30 minutes before food 4 tablet 11     aspirin 81 MG EC tablet Take 81 mg by mouth daily.       calcium carbonate (OS-ALECIA) 600 mg (1,500 mg) tablet Take 600 mg by mouth daily with lunch.        cholecalciferol, vitamin D3, 1,000 unit (25 mcg) tablet Take 1,000 Units by mouth 2 (two) times a day.       ezetimibe (ZETIA) 10 mg tablet Take 1 tablet (10 mg total) by mouth daily. 90 tablet 3     metoprolol tartrate (LOPRESSOR) 25 MG tablet TAKE 1 TABLET BY MOUTH TWICE A  tablet 1     multivitamin with minerals (THERA-M) 9 mg iron-400 mcg Tab tablet Take 1 tablet by mouth daily.       omeprazole (PRILOSEC) 20 MG capsule TAKE 1 CAPSULE (20 MG TOTAL) BY MOUTH EVERY OTHER DAY. 45 capsule 3     rosuvastatin (CRESTOR) 40 MG tablet TAKE 1 TABLET (40 MG TOTAL) BY MOUTH AT BEDTIME. 90 tablet 1     No current facility-administered medications on file prior to visit.      ALLERGIES:  Allergies   Allergen Reactions     Aleve [Naproxen Sodium]      Causes acid reflux     Amoxicillin Hives     Atorvastatin      Annotation: myalgia       Ibuprofen Swelling     Face swelled and became red/ blotchy.  Can take aspirin without reaction     Simvastatin      Didn't work for her.  Did not lower cholesterol much     PHYSICAL EXAM:   /60   Pulse 76   Resp 20   Wt (!) 249 lb 11.2 oz (113.3 kg)   BMI 46.80 kg/m     GENERAL: Colleen is a pleasant, morbidly obese female, in no acute distress.   HEART: Regular rate and rhythm, no murmurs.   LUNGS: Clear to auscultation bilaterally, unlabored.   ABDOMEN: Soft, non-tender to palpation.     ASSESSMENT & PLAN:   Colleen Samaniego is a 70 y.o. female presenting today for completion of disability parking permit and to discuss insurance denial of PPI for chronic reflux symptoms.    1. Gastroesophageal reflux disease without  esophagitis  - famotidine (PEPCID) 20 MG tablet; Take 1 tablet (20 mg total) by mouth 2 (two) times a day.  Dispense: 60 tablet; Refill: 1    Symptoms uncontrolled off omeprazole. States she can't afford to buy OTC, would need to chose between PPI and a different medication if had to purchase OTC.    Discussed with Colleen that this is a common denial. Need to prove need for chronic PPI use. She has not had EGD in the past as symptoms have been well controlled on omeprazole. No red flag symptoms present. Discussed with Colleen that PPIs do have some risk with chronic use, and therefore if patients no longer need, ideal to discontinue. In her case, her symptoms flare without PPI. Recommend trial of H2 blocker. If this is not controlling her symptoms, will submit prior authorization for PPI. Also encouraged to try diet and lifestyle modifications to help decrease symptoms of reflux.     She is at increased risk of gastritis with cotreatment with alendronate for osteoporosis.  Given heart history and cotreatment with aspirin, would opt for pantoprazole if represcribe PPI.    2. Osteoarthritis of knee, unspecified laterality, unspecified osteoarthritis type  3. Encounter for completion of form with patient  Completed disability parking permit form. Copy will be scanned into chart.     RTC: 1 month - follow up reflux symptoms  June 2020 - medicare annual wellness visit / sooner as needed    Susan Maddox DO

## 2021-06-05 NOTE — TELEPHONE ENCOUNTER
"Dr. Maddox-  The prescription for Omeprazole has been denied.   \"Your tier exception request cannot be approved. There are no eligible lower tier formulary alternatives. Eligible alternatives must be supported to treat the same condition. Medicare rules do not allow tier lowering in this case. As such, we cannot approve your request to lower the cost\".  Do you want to appeal this? Or send in a prescription for something else?  "

## 2021-06-05 NOTE — PROGRESS NOTES
Please call Colleen with update.  The radiologist has now reviewed the x-ray and it is normal, stable with the last x-ray.   I discussed with Dr. Lima and he states the symptoms are similar for previous repeat angiogram, so he thinks this is unlikely to be related to your heart.  Lets see how you feel after the antibiotics and if not improving let me know.  Susan Maddox, DO

## 2021-06-05 NOTE — TELEPHONE ENCOUNTER
Claim was already closed. PA was completed by clinic/patient and there was nothing more [facility] PA team can do to complete request.     Per rep Omeprazole is on the lowest tier possible.

## 2021-06-05 NOTE — TELEPHONE ENCOUNTER
Just an FYI she is coming in to see you next week to talk about it. She said its getting worse I told her she can get the OTC omeprazole till then.

## 2021-06-05 NOTE — PROGRESS NOTES
Please call Colleen (she is having issues accessing her MyChart).  Blood counts are normal. X-ray looks normal to me. I am still waiting on the radiologist to review.  I have sent a message to her cardiologist. For now, game plan will be to treat with the antibiotics as prescribed.   Please let me know if there are any questions.  Susan Maddox, DO

## 2021-06-05 NOTE — TELEPHONE ENCOUNTER
Appeal filled out for Zetia tier exception and faxed to [facility].  VM left for pt.

## 2021-06-06 NOTE — TELEPHONE ENCOUNTER
Refill Approved    Rx renewed per Medication Renewal Policy. Medication was last renewed on 2/5/20.    Oumou Burrell, [facility] Triage/Med Refill 2/27/2020     Requested Prescriptions   Pending Prescriptions Disp Refills     famotidine (PEPCID) 20 MG tablet [Pharmacy Med Name: FAMOTIDINE 20 MG TABLET] 60 tablet 1     Sig: TAKE 1 TABLET BY MOUTH TWICE A DAY       GI Medications Refill Protocol Passed - 2/27/2020 10:38 AM        Passed - PCP or prescribing provider visit in last 12 or next 3 months.     Last office visit with prescriber/PCP: 2/5/2020 Susan Maddox DO OR same dept: 2/5/2020 Susan Maddox DO OR same specialty: 2/5/2020 Susan Maddox DO  Last physical: 5/1/2019 Last MTM visit: Visit date not found   Next visit within 3 mo: Visit date not found  Next physical within 3 mo: Visit date not found  Prescriber OR PCP: Susan Maddox DO  Last diagnosis associated with med order: 1. Gastroesophageal reflux disease without esophagitis  - famotidine (PEPCID) 20 MG tablet [Pharmacy Med Name: FAMOTIDINE 20 MG TABLET]; TAKE 1 TABLET BY MOUTH TWICE A DAY  Dispense: 60 tablet; Refill: 1    If protocol passes may refill for 12 months if within 3 months of last provider visit (or a total of 15 months).

## 2021-06-06 NOTE — TELEPHONE ENCOUNTER
Refill Approved    Rx renewed per Medication Renewal Policy. Medication was last renewed on .    Oumou Burrell, [facility] Triage/Med Refill 3/10/2020     Requested Prescriptions   Pending Prescriptions Disp Refills     alendronate (FOSAMAX) 70 MG tablet [Pharmacy Med Name: ALENDRONATE SODIUM 70 MG TAB] 12 tablet 3     Si TABLET EVERY 7 DAYS IN THE MORNING ON EMPTY STOMACH WITH FULL GLASS OF WATER 30 MIN. BEFORE FOOD       Biphosphonates Refill Protocol Passed - 3/7/2020 10:56 AM        Passed - PCP or prescribing provider visit in last 12 months     Last office visit with prescriber/PCP: 3/5/2020 Susan Maddox DO OR same dept: 3/5/2020 Susan Maddox DO OR same specialty: 3/5/2020 Susan Maddox DO  Last physical: 2019 Last MTM visit: Visit date not found   Next visit within 3 mo: Visit date not found  Next physical within 3 mo: Visit date not found  Prescriber OR PCP: Susan Maddox DO  Last diagnosis associated with med order: 1. Age-related osteoporosis without current pathological fracture  - alendronate (FOSAMAX) 70 MG tablet [Pharmacy Med Name: ALENDRONATE SODIUM 70 MG TAB]; 1 TABLET EVERY 7 DAYS IN THE MORNING ON EMPTY STOMACH WITH FULL GLASS OF WATER 30 MIN. BEFORE FOOD  Dispense: 12 tablet; Refill: 3    If protocol passes may refill for 12 months if within 3 months of last provider visit (or a total of 15 months).             Passed - Serum creatinine in last 12 months     Creatinine   Date Value Ref Range Status   2019 0.90 0.60 - 1.10 mg/dL Final

## 2021-06-06 NOTE — PROGRESS NOTES
[facility] Note    Name: Colleen Samaniego  : 1950   MRN: [id number]    Colleen Samaniego is a 70 y.o. female presenting to discuss the following:     CC:   Chief Complaint   Patient presents with     Medication Education Visit     HPI:  Symptoms have been better with the Pepcid. Despite insurance not covering omeprazole, she did have a $10 prescription waiting at the pharmacy from an old physician. She picked this up but hasn't started taking it.    ROS:   See HPI above.     PMH:   Patient Active Problem List   Diagnosis     History of rheumatic fever     Obstructive sleep apnea     Feelings Of Urinary Urgency     S/P knee replacement     Left leg DVT (H)     Coronary artery disease due to calcified coronary lesion     History of pulmonary embolism     S/P CABG x 3     History of DVT (deep vein thrombosis)     GERD (gastroesophageal reflux disease)     Exertional angina (H)     Hypercholesteremia     Age-related osteoporosis without current pathological fracture     Morbid obesity (H)     Dyspnea on exertion       Past Medical History:   Diagnosis Date     Acute blood loss anemia 2015     Acute pulmonary embolism- moderate bilateral 2015     Acute respiratory failure with hypoxia (H) 2015     Arthritis      Coronary artery disease      DVT (deep venous thrombosis) (H)      Family history of myocardial infarction      GERD (gastroesophageal reflux disease)      High cholesterol      History of anesthesia complications     slow to wake up     History of blood clots      Hypercholesterolemia      Left leg DVT (H) 2015    Postop Left TKA     PE (pulmonary thromboembolism) (H)      Pulmonary embolism, bilateral (H) 2015    Postop Left TKA     Rheumatic fever          Sleep apnea, obstructive     uses CPAP     Thrombocytopenia (H) 2016       PSH:   Past Surgical History:   Procedure Laterality Date     APPENDECTOMY       BREAST CYST ASPIRATION       CARDIAC CATHETERIZATION       prior to CABG     CARDIAC CATHETERIZATION  2017    no intervention      SECTION       x2     CORONARY ARTERY BYPASS GRAFT N/A 2016    Procedure: CORONARY ARTERY BYPASS GRAFT X 3, ENDOSCOPIC VEIN HARVEST, INTERNAL MAMMARY ARTERY, ANESTHESIA TRANSESOPHAGEAL ECHOCARDIOGRAM ;  Surgeon: Rancho Rodas MD;  Location: [facility];  Service:      HYSTERECTOMY       JOINT REPLACEMENT      left knee     OOPHORECTOMY       AR CATH PLACEMENT & NJX CORONARY ART ANGIO IMG S&I N/A 2017    Procedure: Coronary Angiogram;  Surgeon: David Youngblood MD;  Location: [facility];  Service: Cardiology     AR CATH PLMT L HRT & ARTS W/NJX & ANGIO IMG S&I Left 2017    Procedure: Left Heart Catheterization Without Left Ventriculogram;  Surgeon: David Youngblood MD;  Location: [facility];  Service: Cardiology     AR TOTAL ABDOM HYSTERECTOMY      Description: Total Abdominal Hysterectomy;  Recorded: 2010;     AR TOTAL KNEE ARTHROPLASTY Left 2015    Procedure: LEFT TOTAL KNEE ARTHROPLASTY;  Surgeon: Good Suggs MD;  Location: [facility];  Service: Orthopedics     AR TOTAL KNEE ARTHROPLASTY Right 2018    Procedure:  RIGHT TOTAL KNEE ARTHROPLASTY COMPLEX BMI 46;  Surgeon: Good Suggs MD;  Location: [facility];  Service: Orthopedics     WRIST SURGERY Right          MEDICATIONS:   Current Outpatient Medications on File Prior to Visit   Medication Sig Dispense Refill     acetaminophen (TYLENOL) 500 MG tablet Take 2 tablets (1,000 mg total) by mouth 3 (three) times a day. (Patient taking differently: Take 1,000 mg by mouth as needed.       )  0     alendronate (FOSAMAX) 70 MG tablet Take 1 tablet (70 mg total) by mouth every 7 days. Take in the morning on an empty stomach with a full glass of water 30 minutes before food 4 tablet 11     aspirin 81 MG EC tablet Take 81 mg by mouth daily.       calcium carbonate (OS-ALECIA) 600 mg (1,500 mg) tablet  "Take 600 mg by mouth daily with lunch.        cholecalciferol, vitamin D3, 1,000 unit (25 mcg) tablet Take 1,000 Units by mouth 2 (two) times a day.       ezetimibe (ZETIA) 10 mg tablet Take 1 tablet (10 mg total) by mouth daily. 90 tablet 3     famotidine (PEPCID) 20 MG tablet TAKE 1 TABLET BY MOUTH TWICE A  tablet 3     metoprolol tartrate (LOPRESSOR) 25 MG tablet TAKE 1 TABLET BY MOUTH TWICE A  tablet 1     multivitamin with minerals (THERA-M) 9 mg iron-400 mcg Tab tablet Take 1 tablet by mouth daily.       rosuvastatin (CRESTOR) 40 MG tablet TAKE 1 TABLET (40 MG TOTAL) BY MOUTH AT BEDTIME. 90 tablet 1     omeprazole (PRILOSEC) 20 MG capsule TAKE 1 CAPSULE (20 MG TOTAL) BY MOUTH EVERY OTHER DAY. 45 capsule 3     No current facility-administered medications on file prior to visit.        ALLERGIES:  Allergies   Allergen Reactions     Aleve [Naproxen Sodium]      Causes acid reflux     Amoxicillin Hives     Atorvastatin      Annotation: myalgia       Ibuprofen Swelling     Face swelled and became red/ blotchy.  Can take aspirin without reaction     Simvastatin      Didn't work for her.  Did not lower cholesterol much       PHYSICAL EXAM:   /70   Pulse 72   Resp 16   Ht 5' 1.25\" (1.556 m)   Wt (!) 251 lb (113.9 kg)   BMI 47.04 kg/m     GENERAL: Colleen is a pleasant, morbidly obese female, in no acute distress.  HEART: Regular rate and rhythm, no murmurs.   LUNGS: Clear to auscultation bilaterally, unlabored.   ABDOMEN: Soft, obese, non-tender to palpation.    ASSESSMENT & PLAN:   Colleen Samaniego is a 70 y.o. female presenting today for follow up pepcid.    1. Gastroesophageal reflux disease without esophagitis  - famotidine (PEPCID) 40 MG tablet; Take 1 tablet (40 mg total) by mouth daily.  Dispense: 90 tablet; Refill: 3     Pepcid has been effective. Will change from 20mg two times a day to 40mg once daily for patient convenience.  As she has found the H2 blocker effective, will continue this " for now instead of submitting another prior authorization for PPI at this time.    RTC: June 2020 - medicare annual wellness visit / sooner as needed     Susan Maddox, DO

## 2021-06-08 NOTE — TELEPHONE ENCOUNTER
RN cannot approve Refill Request    RN can NOT refill this medication PCP messaged that patient is overdue for Labs. Last office visit: 3/5/2020 Susan Maddox DO Last Physical: 2019 Last MTM visit: Visit date not found Last visit same specialty: 3/5/2020 Susan Maddox DO.  Next visit within 3 mo: Visit date not found  Next physical within 3 mo: Visit date not found      Awilda Colunga, [facility] Triage/Med Refill 2020    Requested Prescriptions   Pending Prescriptions Disp Refills     alendronate (FOSAMAX) 70 MG tablet [Pharmacy Med Name: ALENDRONATE SODIUM 70 MG TAB] 12 tablet 0     Si TABLET EVERY 7 DAYS IN THE MORNING ON EMPTY STOMACH WITH FULL GLASS OF WATER 30 MIN. BEFORE FOOD       Biphosphonates Refill Protocol Failed - 2020  9:40 AM        Failed - Serum creatinine in last 12 months     Creatinine   Date Value Ref Range Status   2019 0.90 0.60 - 1.10 mg/dL Final             Passed - PCP or prescribing provider visit in last 12 months     Last office visit with prescriber/PCP: 3/5/2020 Susan Maddox DO OR same dept: 3/5/2020 Susan Maddox DO OR same specialty: 3/5/2020 Susan Maddox DO  Last physical: 2019 Last MTM visit: Visit date not found   Next visit within 3 mo: Visit date not found  Next physical within 3 mo: Visit date not found  Prescriber OR PCP: Susan Maddox DO  Last diagnosis associated with med order: 1. Age-related osteoporosis without current pathological fracture  - alendronate (FOSAMAX) 70 MG tablet [Pharmacy Med Name: ALENDRONATE SODIUM 70 MG TAB]; 1 TABLET EVERY 7 DAYS IN THE MORNING ON EMPTY STOMACH WITH FULL GLASS OF WATER 30 MIN. BEFORE FOOD  Dispense: 12 tablet; Refill: 0    If protocol passes may refill for 12 months if within 3 months of last provider visit (or a total of 15 months).

## 2021-06-10 NOTE — PROGRESS NOTES
"Subjective: This patient comes in for follow-up from the hospital.  She was hospitalized at [facility] 5/2 through 5/3/17.  She had chest pain which was exertional they felt was atypical she comes in for follow-up.    She had a coronary artery bypassed April 2016.  She had a nuclear stress test December 2016.    Patient feels pressure in through the chest she states that it happened with lifting.    Over the weekend after she got discharged she felt the pressure again when cutting grass she felt short of breath and had to stop.    She feels a \"\"in the sternum upper sternum a little defect on exam as noted but she did have a CT scan which showed the sternotomy to which appeared well-healed.  There was no evidence of pulmonary embolism no aortic dissection or aneurysm.    Patient has had previous pulmonary emboli.  Her O2 sat looked good at 98% she has no hemoptysis no shortness of breath other than with the activity.    Labs from the hospital showed BMP CBC normal magnesium normal.    Patient's previous lipids looked fine back in December.    Med reconciliation was done today.    She does take omeprazole really does not sound like reflux.    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  She was sent to us because she had coronary artery bypass between May 24, 2014 and presents.  There was some possible exposure please see the letter which is now in the chart.  She has no significant symptoms to go along with this i.e. no significant night sweats unexplained fever or weight loss muscle aches redness around the surgical incision site.    She was going to talk to her cardiologist about this as well.    I did get her in to see her cardiologist tomorrow as otherwise she will be able to get in to see him until July.  I think with the ongoing shortness of breath and chest pressure with activity will have her seen earlier rather than " later.  Tobacco status: She  reports that she has quit smoking. She has never used smokeless tobacco.    Patient Active Problem List    Diagnosis Date Noted     Dyslipidemia      Exertional angina 05/02/2017     GERD (gastroesophageal reflux disease) 05/10/2016     History of DVT (deep vein thrombosis) 05/05/2016     Thrombocytopenia 05/05/2016     CAD (coronary artery disease) 04/29/2016     S/P CABG x 3      Unstable angina pectoris 04/28/2016     History of pulmonary embolism      Coronary artery disease due to lipid rich plaque 04/15/2016     Acute pulmonary embolism- moderate bilateral 09/11/2015     Acute respiratory failure with hypoxia 09/11/2015     Left leg DVT 09/11/2015     Acute blood loss anemia 09/11/2015     S/P knee replacement 09/08/2015     Hypercholesterolemia      Rheumatic fever      Feelings Of Urinary Urgency      Rheumatic Fever      Atypical chest pain      Hyperlipidemia      Obstructive Sleep Apnea      Acute Pharyngitis        Current Outpatient Prescriptions   Medication Sig Dispense Refill     acetaminophen (TYLENOL) 500 MG tablet Take 1,000 mg by mouth every 6 (six) hours as needed for pain.       aspirin 81 mg chewable tablet Chew 81 mg daily.       calcium carbonate (OS-ALECIA) 600 mg (1,500 mg) tablet Take 600 mg by mouth daily with lunch.        metoprolol tartrate (LOPRESSOR) 25 MG tablet Take 1 tablet (25 mg total) by mouth 2 (two) times a day. 60 tablet 11     multivitamin with minerals (THERA-M) 9 mg iron-400 mcg Tab tablet Take 1 tablet by mouth daily.       omeprazole (PRILOSEC) 20 MG capsule Take 1 capsule (20 mg total) by mouth every other day. 45 capsule 2     rosuvastatin (CRESTOR) 40 MG tablet Take 40 mg by mouth bedtime.  4     No current facility-administered medications for this visit.        ROS:   10 point review of systems negative other than as outlined above    Objective:    /64 (Patient Site: Left Arm, Patient Position: Sitting, Cuff Size: Adult Large)   "Pulse 60  Temp 99  F (37.2  C) (Oral)   Resp 24  Ht 5' 1.75\" (1.568 m)  Wt (!) 234 lb (106.1 kg)  SpO2 98% Comment: at rest with room air  BMI 43.15 kg/m2  Body mass index is 43.15 kg/(m^2).      General appearance no acute distress    Vital signs are stable O2 sat 98%    HEENT neck without JVD oropharynx was clear    Lungs were clear throughout no rales or rhonchi, heart was regular rate around 60    She has a slight depression in the upper sternum she really not tender does not separate there is no clicking with movement she does not really have reproductive pain with palpation or with shifting the shoulders etc.    CT scan from the hospital reviewed.  E KG reviewed    Labs reviewed please see below    No abdominal pain no epigastric pain    Extremities without edema skin was normal.  No rashes or warmth or redness.    Results for orders placed or performed during the hospital encounter of 05/02/17   Basic Metabolic Panel   Result Value Ref Range    Sodium 141 136 - 145 mmol/L    Potassium 3.8 3.5 - 5.0 mmol/L    Chloride 106 98 - 107 mmol/L    CO2 26 22 - 31 mmol/L    Anion Gap, Calculation 9 5 - 18 mmol/L    Glucose 101 70 - 125 mg/dL    Calcium 9.2 8.5 - 10.5 mg/dL    BUN 21 8 - 22 mg/dL    Creatinine 0.99 0.60 - 1.10 mg/dL    GFR MDRD Af Amer >60 >60 mL/min/1.73m2    GFR MDRD Non Af Amer 56 (L) >60 mL/min/1.73m2   Troponin I   Result Value Ref Range    Troponin I <0.01 0.00 - 0.29 ng/mL   Magnesium   Result Value Ref Range    Magnesium 2.1 1.8 - 2.6 mg/dL   BNP(B-type Natriuretic Peptide)   Result Value Ref Range    BNP 42 0 - 114 pg/mL   HM1 (CBC with Diff)   Result Value Ref Range    WBC 6.7 4.0 - 11.0 thou/uL    RBC 3.78 (L) 3.80 - 5.40 mill/uL    Hemoglobin 11.6 (L) 12.0 - 16.0 g/dL    Hematocrit 34.8 (L) 35.0 - 47.0 %    MCV 92 80 - 100 fL    MCH 30.7 27.0 - 34.0 pg    MCHC 33.3 32.0 - 36.0 g/dL    RDW 13.7 11.0 - 14.5 %    Platelets 215 140 - 440 thou/uL    MPV 10.0 8.5 - 12.5 fL    Neutrophils % " 49 (L) 50 - 70 %    Lymphocytes % 41 (H) 20 - 40 %    Monocytes % 8 2 - 10 %    Eosinophils % 2 0 - 6 %    Basophils % 1 0 - 2 %    Neutrophils Absolute 3.2 2.0 - 7.7 thou/uL    Lymphocytes Absolute 2.7 0.8 - 4.4 thou/uL    Monocytes Absolute 0.5 0.0 - 0.9 thou/uL    Eosinophils Absolute 0.1 0.0 - 0.4 thou/uL    Basophils Absolute 0.1 0.0 - 0.2 thou/uL   Troponin I   Result Value Ref Range    Troponin I <0.01 0.00 - 0.29 ng/mL   Troponin I   Result Value Ref Range    Troponin I <0.01 0.00 - 0.29 ng/mL   ECG 12 lead nursing unit performed   Result Value Ref Range    SYSTOLIC BLOOD PRESSURE  mmHg    DIASTOLIC BLOOD PRESSURE  mmHg    VENTRICULAR RATE 85 BPM    ATRIAL RATE 85 BPM    P-R INTERVAL 178 ms    QRS DURATION 92 ms    Q-T INTERVAL 384 ms    QTC CALCULATION (BEZET) 456 ms    P Axis 44 degrees    R AXIS 8 degrees    T AXIS 77 degrees    MUSE DIAGNOSIS       Normal sinus rhythm  RSR' or QR pattern in V1 suggests right ventricular conduction delay  Nonspecific ST and T wave abnormality  Abnormal ECG  When compared with ECG of 01-DEC-2016 11:52,  Vent. rate has increased BY  28 BPM  QT has lengthened  Confirmed by IVET CERVANTES, [facility] LOC:[facility] ([id number]) on 5/3/2017 2:58:03 PM         Assessment:  1. Hospital discharge follow-up     2. S/P CABG x 3  Ambulatory referral to Cardiology   3. Angina pectoris  Ambulatory referral to Cardiology   4. Hyperlipidemia     5. GERD (gastroesophageal reflux disease)       Patient will see cardiology tomorrow    Continue same medications med reconciliation was done.    Exertional chest pain with shortness of breath.,  History of CABG    Also history of GERD on omeprazole seems to be stable regarding that    Plan: As outlined above    This transcription uses voice recognition software, which may contain typographical errors.

## 2021-06-10 NOTE — TELEPHONE ENCOUNTER
RN cannot approve Refill Request    RN can NOT refill this medication PCP messaged that patient is overdue for Labs and med is not covered by policy/route to provider. Last office visit: Visit date not found Last Physical: Visit date not found Last MTM visit: Visit date not found Last visit same specialty: 3/5/2020 Susan Maddox DO.  Next visit within 3 mo: Visit date not found  Next physical within 3 mo: Visit date not found      Awilda Colunga, [facility] Triage/Med Refill 2020    Requested Prescriptions   Pending Prescriptions Disp Refills     alendronate (FOSAMAX) 70 MG tablet [Pharmacy Med Name: ALENDRONATE SODIUM 70 MG TAB] 12 tablet 0     Si TABLET ONCE EVERY 7 DAYS IN THE A.M. ON EMPTY STOMACH WITH FULL GLASS WATER 30 MINUTES BEFORE FOOD       Biphosphonates Refill Protocol Failed - 2020 12:33 AM        Failed - Serum creatinine in last 12 months     Creatinine   Date Value Ref Range Status   2019 0.90 0.60 - 1.10 mg/dL Final             Passed - PCP or prescribing provider visit in last 12 months     Last office visit with prescriber/PCP: Visit date not found OR same dept: 3/5/2020 Susan Maddox DO OR same specialty: 3/5/2020 Susan Maddox DO  Last physical: Visit date not found Last MTM visit: Visit date not found   Next visit within 3 mo: Visit date not found  Next physical within 3 mo: Visit date not found  Prescriber OR PCP: Estefanía Tolliver MD  Last diagnosis associated with med order: 1. Age-related osteoporosis without current pathological fracture  - alendronate (FOSAMAX) 70 MG tablet [Pharmacy Med Name: ALENDRONATE SODIUM 70 MG TAB]; 1 TABLET ONCE EVERY 7 DAYS IN THE A.M. ON EMPTY STOMACH WITH FULL GLASS WATER 30 MINUTES BEFORE FOOD  Dispense: 12 tablet; Refill: 0    If protocol passes may refill for 12 months if within 3 months of last provider visit (or a total of 15 months).

## 2021-06-10 NOTE — PROGRESS NOTES
The 10-year ASCVD risk score ([place] BHUPINDER Jr., et al., 2013) is: 12.1%, on statin, zetia, and baby ASA, LDL is down to 70.   CBC and TSH normal. GFR 57. Remainder of labs are normal.  Patient updated by Adal.   Susan Maddox DO

## 2021-06-10 NOTE — PROGRESS NOTES
Assessment and Plan:     Colleen Samaniego is a 70 y.o. female presenting today for medicare annual wellness exam.    1. Medicare annual wellness visit, initial  Reviewed health history, health risk assessment form, and health maintenance recommendations.     2. Acute pain of right shoulder  Symptoms are consistent with right trapezius spasm/strain. Recommend rest, heat, topicals, and massage. If not improving, will refer to PT.     3. Hypercholesteremia  4. S/P CABG x 3  - Comprehensive Metabolic Panel  - Lipid Cascade FASTING    Continue statin, aspirin, ezetimibe, and metoprolol. Follow up with cardiology.     5. Dyspnea on exertion  - HM2(CBC w/o Differential)    Will evaluate for anemia. Likely largely due to deconditioning. No exertional chest pain. S/p CABG.     6. Gastroesophageal reflux disease without esophagitis  Symptoms are well controlled with Pepcid. If recurrence, will discontinue alendronate.    7. Dry skin  8. Excessive sweating  - Thyroid Cascade    Will rule out thyroid disease.    9. Visit for screening mammogram  - Mammo Screening Bilateral; Future       The patient's current medical problems were reviewed.    The following health maintenance schedule was reviewed with the patient and provided in printed form in the after visit summary:   Health Maintenance   Topic Date Due     ZOSTER VACCINES (2 of 2) 09/09/2015     MEDICARE ANNUAL WELLNESS VISIT  05/01/2020     INFLUENZA VACCINE RULE BASED (1) 08/01/2020     MAMMOGRAM  10/12/2020     FALL RISK ASSESSMENT  03/05/2021     DXA SCAN  05/09/2021     LIPID  05/01/2024     ADVANCE CARE PLANNING  05/01/2024     TD 18+ HE  04/26/2028     COLORECTAL CANCER SCREENING  12/10/2028     HEPATITIS C SCREENING  Completed     PNEUMOCOCCAL IMMUNIZATION 65+ LOW/MEDIUM RISK  Completed     HEPATITIS B VACCINES  Aged Out        Subjective:   Chief Complaint: Colleen Samaniego is an 70 y.o. female here for a Welcome to Medicare visit.       Chief Complaint   Patient  presents with     Annual Exam       HPI:    Having right shoulder pain, focal in upper back. Persistent now last 2-3 months. Has tried aspercreme, seems to help to a degree. At times, feels like a really sharp pain, grabbing shoulder to put pressure on it. Has been improving lately. Range of motion is good. Sometimes feels pulling sensation.     She does have some symptoms of dry skin, excessive sweating, and changes in hair texture. Sweating is worse when outside mowing the lawn. She also feels a little lightheaded when exerting herself, declines syncope. She has mild cataracts, not yet ready to fix them.     Health Risk Assessment form: Colleen rates her physical health as good. She isn't regularly exercising and doesn't eat enough fruits or veggies. She is working on increasing this. She doesn't regularly drink alcohol. She doesn't need any help with activities of daily living. She doesn't have concerns about her hearing, she does have hearing aids already. She does have urinary incontinence but feels this is adequately controlled. She states mood is good, declined answering PHQ2 questions as symptoms are rare but does intermittently feel low a few times a month. She does not have an advanced directive. She has not fallen in the last 12 months.     Review of Systems:  Please see above.  The rest of the review of systems are negative for all systems.    Patient Care Team:  Susan Maddox DO as PCP - General (Family Medicine)  Susan Maddox DO as Assigned PCP     Patient Active Problem List   Diagnosis     History of rheumatic fever     Obstructive sleep apnea     Feelings Of Urinary Urgency     S/P knee replacement     Coronary artery disease due to calcified coronary lesion     History of pulmonary embolism     S/P CABG x 3     History of DVT (deep vein thrombosis)     GERD (gastroesophageal reflux disease)     Exertional angina (H)     Hypercholesteremia     Age-related osteoporosis without current pathological  fracture     Morbid obesity (H)     Dyspnea on exertion     Past Medical History:   Diagnosis Date     Acute blood loss anemia 2015     Acute pulmonary embolism- moderate bilateral 2015     Acute respiratory failure with hypoxia (H) 2015     Arthritis      Coronary artery disease      DVT (deep venous thrombosis) (H)      Family history of myocardial infarction      GERD (gastroesophageal reflux disease)      High cholesterol      History of anesthesia complications     slow to wake up     History of blood clots      Hypercholesterolemia      Left leg DVT (H) 2015    Postop Left TKA     PE (pulmonary thromboembolism) (H)      Pulmonary embolism, bilateral (H) 2015    Postop Left TKA     Rheumatic fever          Sleep apnea, obstructive     uses CPAP     Thrombocytopenia (H) 2016      Past Surgical History:   Procedure Laterality Date     APPENDECTOMY       BREAST CYST ASPIRATION       CARDIAC CATHETERIZATION      prior to CABG     CARDIAC CATHETERIZATION  2017    no intervention      SECTION       x2     CORONARY ARTERY BYPASS GRAFT N/A 2016    Procedure: CORONARY ARTERY BYPASS GRAFT X 3, ENDOSCOPIC VEIN HARVEST, INTERNAL MAMMARY ARTERY, ANESTHESIA TRANSESOPHAGEAL ECHOCARDIOGRAM ;  Surgeon: Rancho Rodas MD;  Location: [facility];  Service:      HYSTERECTOMY       JOINT REPLACEMENT      left knee     OOPHORECTOMY       NJ CATH PLACEMENT & NJX CORONARY ART ANGIO IMG S&I N/A 2017    Procedure: Coronary Angiogram;  Surgeon: David Youngblood MD;  Location: [facility] Cath Lab;  Service: Cardiology     NJ CATH PLMT L HRT & ARTS W/NJX & ANGIO IMG S&I Left 2017    Procedure: Left Heart Catheterization Without Left Ventriculogram;  Surgeon: David Youngblood MD;  Location: [facility] Cath Lab;  Service: Cardiology     NJ TOTAL ABDOM HYSTERECTOMY      Description: Total Abdominal Hysterectomy;  Recorded: 2010;     NJ TOTAL KNEE  ARTHROPLASTY Left 2015    Procedure: LEFT TOTAL KNEE ARTHROPLASTY;  Surgeon: Good Suggs MD;  Location: [facility] Main OR;  Service: Orthopedics     MT TOTAL KNEE ARTHROPLASTY Right 2018    Procedure:  RIGHT TOTAL KNEE ARTHROPLASTY COMPLEX BMI 46;  Surgeon: Good Suggs MD;  Location: [facility] Main OR;  Service: Orthopedics     WRIST SURGERY Right       Family History   Problem Relation Age of Onset     Hypertension Mother      Heart disease Mother      Heart disease Father      Snoring Father      Stroke Sister      Heart disease Sister      Clotting disorder Sister      Factor V Leiden deficiency Sister      Clotting disorder Brother      Factor V Leiden deficiency Brother      Anesthesia problems Neg Hx      Breast cancer Neg Hx       Social History     Tobacco Use     Smoking status: Former Smoker     Last attempt to quit: 2001     Years since quittin.7     Smokeless tobacco: Never Used   Substance Use Topics     Alcohol use: No     Drug use: No       Current Outpatient Medications   Medication Sig Dispense Refill     acetaminophen (TYLENOL) 500 MG tablet Take 2 tablets (1,000 mg total) by mouth 3 (three) times a day. (Patient taking differently: Take 1,000 mg by mouth as needed.       )  0     alendronate (FOSAMAX) 70 MG tablet 1 TABLET ONCE EVERY 7 DAYS IN THE A.M. ON EMPTY STOMACH WITH FULL GLASS WATER 30 MINUTES BEFORE FOOD 12 tablet 3     aspirin 81 MG EC tablet Take 81 mg by mouth daily.       calcium carbonate (OS-ALECIA) 600 mg (1,500 mg) tablet Take 600 mg by mouth daily with lunch.        cholecalciferol, vitamin D3, 1,000 unit (25 mcg) tablet Take 1,000 Units by mouth 2 (two) times a day.       ezetimibe (ZETIA) 10 mg tablet Take 1 tablet (10 mg total) by mouth daily. 90 tablet 3     famotidine (PEPCID) 40 MG tablet Take 1 tablet (40 mg total) by mouth daily. 90 tablet 3     metoprolol tartrate (LOPRESSOR) 25 MG tablet TAKE 1 TABLET BY MOUTH TWICE A  tablet 1      "multivitamin with minerals (THERA-M) 9 mg iron-400 mcg Tab tablet Take 1 tablet by mouth daily.       rosuvastatin (CRESTOR) 40 MG tablet TAKE 1 TABLET BY MOUTH EVERYDAY AT BEDTIME 90 tablet 0     No current facility-administered medications for this visit.       Objective:   Vital Signs:   Visit Vitals  /76   Pulse (!) 59   Temp 98.3  F (36.8  C) (Oral)   Ht 5' 1.25\" (1.556 m)   Wt (!) 250 lb 2 oz (113.5 kg)   BMI 46.88 kg/m         VisionScreening:  No exam data present     PHYSICAL EXAM  GENERAL: Colleen is a pleasant, well appearing female, obese, in no acute distress.  HEENT: Sclera white, no thyromegaly, no cervical lymphadenopathy.  HEART: Regular rate and rhythm, no murmurs.  LUNGS: Clear to auscultation bilaterally, unlabored.  ABDOMEN: Obese, soft, non-tender to palpation.  EXTREMITIES: No lower extremity edema present, pulses intact, normal capillary refill    Assessment Results 8/18/2020   Activities of Daily Living No help needed   Instrumental Activities of Daily Living No help needed   Mini Cog Total Score 5   Some recent data might be hidden     A Mini Cog score of 0-2 suggests the possibility of dementia, score of 3-5 suggests no dementia    Identified Health Risks:     "

## 2021-06-11 NOTE — PROGRESS NOTES
"      Cardiothoracic Surgery Consult    Date of Service: 6/2/2017    REFERRING CARDIOLOGIST: Dr. Devan Liam    REASON FOR CONSULTATION: Ms. Samaniego is a 67 year-old woman status post coronary artery bypass grafting with exertional chest pain/tightness and occasional sharp back pain.     HISTORY OF PRESENT ILLNESS: Ms. Samaniego is a 67 year-old woman who is well known to me because she is status post coronary artery bypass grafting x 3 over a year ago. She has done well, but she has reproducible exertional exertional chest pain/tightness associated with dyspnea. It is relieved by rest and she will often also rub her chest to relieve the pain. She underwent coronary angiohraphy and all three grafts are patent. CT scan PE protocol does not demonstrate any PE and the sternum is well healed. 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 She also had an episode several months ago where she was down on all fours scrubbing the floor and she felt a pop. She thought she noticed a \"gap\" in the upper sternum after that. In addition, she has occasional sharp back pain; this is in the upper mid back. It seems to be unrelated to her chest discomfort.    PAST MEDICAL HISTORY:   Past Medical History:   Diagnosis Date     Arthritis      Coronary artery disease      DVT (deep venous thrombosis)      Family history of myocardial infarction      GERD (gastroesophageal reflux disease)      High cholesterol      History of anesthesia complications     slow to wake up     Hypercholesterolemia      Left leg DVT 9/2015    Postop Left TKA     PE (pulmonary thromboembolism)      Pulmonary embolism, bilateral 9/2015    Postop Left TKA     Rheumatic fever     1953     Sleep apnea, obstructive     uses CPAP       PAST SURGICAL HISTORY:   Past Surgical History:   Procedure Laterality Date     APPENDECTOMY       BREAST CYST ASPIRATION       CARDIAC CATHETERIZATION  2016    prior to CABG     CARDIAC CATHETERIZATION  05/24/2017    " no intervention      SECTION       x2     CORONARY ARTERY BYPASS GRAFT N/A 2016    Procedure: CORONARY ARTERY BYPASS GRAFT X 3, ENDOSCOPIC VEIN HARVEST, INTERNAL MAMMARY ARTERY, ANESTHESIA TRANSESOPHAGEAL ECHOCARDIOGRAM ;  Surgeon: Rancho Rodas MD;  Location: [facility];  Service:      HYSTERECTOMY       JOINT REPLACEMENT      left knee     OOPHORECTOMY       IA CATH PLACEMENT & NJX CORONARY ART ANGIO IMG S&I N/A 2017    Procedure: Coronary Angiogram;  Surgeon: David Youngblood MD;  Location: [facility] Cath Lab;  Service: Cardiology     IA CATH PLMT L HRT & ARTS W/NJX & ANGIO IMG S&I Left 2017    Procedure: Left Heart Catheterization Without Left Ventriculogram;  Surgeon: David Youngblood MD;  Location: [facility] Cath Lab;  Service: Cardiology     IA TOTAL ABDOM HYSTERECTOMY      Description: Total Abdominal Hysterectomy;  Recorded: 2010;     IA TOTAL KNEE ARTHROPLASTY Left 2015    Procedure: LEFT TOTAL KNEE ARTHROPLASTY;  Surgeon: Good Suggs MD;  Location: [facility];  Service: Orthopedics     WRIST SURGERY Right        ALLERGIES:   Allergies   Allergen Reactions     Aleve [Naproxen Sodium]      Causes acid reflux     Amoxicillin Hives     Atorvastatin      Annotation: myalgia       Ibuprofen Swelling     Face swelled and became red/ blotchy.  Can take aspirin without reaction     Simvastatin      Didn't work for her.  Did not lower cholesterol much          CURRENT MEDICATIONS:  Current Outpatient Prescriptions on File Prior to Visit   Medication Sig Dispense Refill     acetaminophen (TYLENOL) 500 MG tablet Take 1,000 mg by mouth every 6 (six) hours as needed for pain.       aspirin 81 mg chewable tablet Chew 81 mg daily.       calcium carbonate (OS-ALECIA) 600 mg (1,500 mg) tablet Take 600 mg by mouth daily with lunch.        metoprolol tartrate (LOPRESSOR) 25 MG tablet Take 1 tablet (25 mg total) by mouth 2 (two) times a day. 60 tablet  "11     multivitamin with minerals (THERA-M) 9 mg iron-400 mcg Tab tablet Take 1 tablet by mouth daily.       omeprazole (PRILOSEC) 20 MG capsule Take 1 capsule (20 mg total) by mouth every other day. 45 capsule 2     rosuvastatin (CRESTOR) 40 MG tablet Take 40 mg by mouth bedtime.  4     No current facility-administered medications on file prior to visit.          FAMILY HISTORY:   Family History   Problem Relation Age of Onset     Hypertension Mother      Heart disease Mother      Heart disease Father      Stroke Sister      Heart disease Sister      Clotting disorder Sister      Factor V Leiden deficiency Sister      Clotting disorder Brother      Factor V Leiden deficiency Brother      Anesthesia problems Neg Hx      Breast cancer Neg Hx          SOCIAL HISTORY:   Social History     Social History     Marital status:      Spouse name: N/A     Number of children: N/A     Years of education: N/A     Occupational History     Not on file.     Social History Main Topics     Smoking status: Former Smoker     Quit date: 11/23/2001     Smokeless tobacco: Never Used     Alcohol use No     Drug use: No     Sexual activity: Not on file     Other Topics Concern     Not on file     Social History Narrative       REVIEW OF SYSTEMS:  A complete 10 point review of systems was obtained and is negative other than the above stated complaints    PHYSICAL EXAMINATION:   Vitals: /64 (Patient Site: Right Arm, Patient Position: Sitting, Cuff Size: Adult Large)  Pulse 60  Resp 16  Ht 5' 1\" (1.549 m)  Wt (!) 230 lb (104.3 kg)  BMI 43.46 kg/m2  GENERAL:  Well developed and well nourished   HEENT: Normocephalic, conjunctiva anicteric and sclera clear   NECK: negative findings: no asymmetry, masses, or scars  CARDIOVASCULAR: Regular rate and rhythm  RESPIRATIONS: no tachypnea, retractions or cyanosis  ABDOMEN: normal findings: soft, non-tenderno masses palpable and soft, non-tender   EXTREMITIES:negative; no deformity or " swelling   NEUROLOGIC: intact and symmetric with no focal deficits.   PSYCHIATRIC: alert and oriented x3, pleasant     LABORATORY STUDIES:   Lab Results   Component Value Date    WBC 5.5 05/24/2017    HGB 11.2 (L) 05/24/2017    HCT 34.8 (L) 05/24/2017    MCV 93 05/24/2017     05/24/2017      Lab Results   Component Value Date    CREATININE 1.01 05/24/2017    BUN 22 05/24/2017     05/24/2017    K 3.8 05/24/2017     (H) 05/24/2017    CO2 25 05/24/2017     Lab Results   Component Value Date    HGBA1C 5.5 04/28/2016       EKG:   Normal sinus rhythm   RSR' or QR pattern in V1 suggests right ventricular conduction delay   Cannot rule out Anterior infarct , age undetermined   Abnormal ECG   When compared with ECG of 02-MAY-2017 16:42,   No significant change was found     CARDIAC CATHETERIZATION: This study was personally reviewed by me  1. Chest pain - musculoskeletal -related to either incisino or to statin - schedule CTnon contrast of sternum, follow up with CT surgery in clinic, hold statin for four weeks and rechalleng  2. CAD s/p CABG 3 vessel all grafts patent       CT CHEST PE PROTOCOL: This study was personally reviewed by me  1.  Intact sternal wires.  2.  The large majority of the sternotomy is well healed.  3.  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  4.  No PE    NUCLEAR MEDICINE STRESS TEST:  Baseline electrocardiogram demonstrates sinus rhythm.   The stress electrocardiogram is negative for inducible ischemia.There were no arrhythmias during stress.     There were no arrhythmias during recovery.    IMPRESSION AND PLAN: Ms. Samaniego is a 67 year-old woman status post coronary artery bypass grafting with exertional chest pain/tightness and occasional sharp back pain. The pain seems to be musculoskeletal on exam even though the history she gives is that it is associated with exertion. In any event, the grafts are patent. The CT scan shows that the sternum is well  healed, save for a small portion of the upper manubrium. I do not believe that this explains her symptoms, and I would not recommend operative fixation because of the risk with little to gain for such a small finding. I recommend observation and continued exercise. The wires are all intact and I do not think she can hurt her sternum with activity. The back pain issue seems unrelated and she may benefit from further investigation of this, possibly with an MRI.    Thank you very much for this referral.       Rancho Rodas 6/2/2017 10:31 AM

## 2021-06-11 NOTE — PROGRESS NOTES
Cardiology Progress Note    Assessment:    Coronary artery disease status post coronary artery bypass graft surgery in 2016, stable  Episodic chest discomfort, etiology not entirely clear, possibly related to sternotomy incision  Lightheadedness rule out episodic dysrhythmias  Hyperlipidemia  Obesity  History of DVT and PE      Plan:  I reassured her that the results of coronary angiogram confirmed patency of all the grafts.  CT of the chest showed only small area of sternal nonunion.  She was evaluated by CV surgeon who did not think that would require any additional intervention.  The features of the chest discomfort are not suggestive of GERD.    Heart monitor for episodic lightheadedness.    She should resume cholesterol lowering medications.  We will put her on rosuvastatin 20 mg a day.  She has a history of atorvastatin and simvastatin intolerance    Follow-up in 3 months    Subjective:   This is 67 y.o. female who comes in today for follow-up visit.  She has had recurrent chest pains.  She underwent coronary angiogram.  She was found to have patent grafts.  There was no new lesions requiring intervention.  Dr. Youngblood advised her to stop taking rosuvastatin.  She is uncertain whether that made any difference in the intensity of the chest discomfort.  She gets rare episodes of nonexertional chest pain.  The pain is located centrally.  The last few minutes at a time.  Last time it occurred after she was doing a lot of hedge trimming.  She underwent CT of the chest to rule out sternal malunion.  She was reevaluated by CV surgeon who did not think that she required any additional surgical sternal stabilization.    On 2 or 3 occasions she felt lightheaded.  This occurred when she was in sitting position.  She did not feel a heart palpitations.  She does not have a history of syncope.  Review of Systems:   10 point review of systems negative  Objective:   /62 (Patient Site: Left Arm, Patient Position:  "Sitting, Cuff Size: Adult Large)  Pulse 60  Resp 18  Ht 5' 1\" (1.549 m)  Wt (!) 230 lb (104.3 kg)  BMI 43.46 kg/m2  Physical Exam:  GENERAL: no distress  NECK: No JVD  LUNGS: Clear to auscultation.  CARDIAC: regular rhythm, S1 & S2 normal.  No heaves, thrills, gallops or murmurs.  ABDOMEN: flat, negative hepatosplenomegaly, soft and non-tender.  EXTREMITIES: No evidence of cyanosis, clubbing or edema.    Current Outpatient Prescriptions   Medication Sig Dispense Refill     acetaminophen (TYLENOL) 500 MG tablet Take 1,000 mg by mouth every 6 (six) hours as needed for pain.       aspirin 81 mg chewable tablet Chew 81 mg daily.       calcium carbonate (OS-ALECIA) 600 mg (1,500 mg) tablet Take 600 mg by mouth daily with lunch.        metoprolol tartrate (LOPRESSOR) 25 MG tablet Take 1 tablet (25 mg total) by mouth 2 (two) times a day. 180 tablet 2     multivitamin with minerals (THERA-M) 9 mg iron-400 mcg Tab tablet Take 1 tablet by mouth daily.       omeprazole (PRILOSEC) 20 MG capsule Take 1 capsule (20 mg total) by mouth every other day. 45 capsule 2     rosuvastatin (CRESTOR) 20 MG tablet Take 1 tablet (20 mg total) by mouth at bedtime. 30 tablet 12     No current facility-administered medications for this visit.        Cardiographics:    CT chest:  1.  Intact sternal wires.  2.  The large majority of the sternotomy is well healed.  3.  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    Coronary angio:    Mid RCA lesion 85% stenosed.    Prox LAD to Mid LAD lesion 90% stenosed.    Prox Cx to Mid Cx lesion 75% stenosed.    Estimated blood loss was <20 ml.    The LM vessel was moderate.      Pt with severe 3 vessel dz, s/p CABG last spring, with chest pain on exertion     Angiography:  LM mild dz  LAD severe dz mid with patent LIMA, small diagona  Circ severe dz in OM with patnet SVG  RCA severe dz in mid with patent SVG  LVEDP 8mmHg  Aortic root mildly enlarged      Patient began having " severe chest pain when laying flat, worse with direct pressure over sternal incision site.     Imp/plan:  1. Chest pain - musculoskeletal -related to either incisino or to statin - schedule CTnon contrast of sternum, follow up with CT surgery in clinic, hold statin for four weeks and rechalleng  2. CAD s/p CABG 3 vessel all grafts patent  Lab Results:       Lab Results   Component Value Date    CHOL 155 05/24/2017    CHOL 164 12/01/2016    CHOL 188 07/26/2016     Lab Results   Component Value Date    HDL 36 (L) 05/24/2017    HDL 34 (L) 12/01/2016    HDL 40 (L) 07/26/2016     Lab Results   Component Value Date    LDLCALC 93 05/24/2017    LDLCALC 99 12/01/2016    LDLCALC 108 07/26/2016     Lab Results   Component Value Date    TRIG 131 05/24/2017    TRIG 156 (H) 12/01/2016    TRIG 202 (H) 07/26/2016     No components found for: CHOLHDL  BNP   Date Value Ref Range Status   05/02/2017 42 0 - 114 pg/mL Final       Kyler (Devan)  MD Janie

## 2021-06-12 NOTE — TELEPHONE ENCOUNTER
Wellness Screening Tool  Symptom Screening:  Do you have one of the following NEW symptoms:    Fever (subjective or >100.0)?  No    A new cough?  No    Shortness of breath?  No     Chills? No     New loss of taste or smell? No     Generalized body aches? No     New persistent headache? No     New sore throat? No     Nausea, vomiting, or diarrhea?  No    Within the past 2 weeks, have you been exposed to someone with a known positive illness below:    COVID-19 (known or suspected)?  No    Chicken pox?  No    Mealses?  No    Pertussis?  No    Patient notified of visitor policy- They may have one person accompany them to their appointment, but they will need to wear a mask and will be screened upon arrival for symptoms: Yes  Pt informed to wear a mask: Yes  Pt notified if they develop any symptoms listed above, prior to their appointment, they are to call the clinic directly at [phone] for further instructions.  Yes  Patient's appointment status: Patient will be seen in clinic as scheduled on 10/7

## 2021-06-12 NOTE — PATIENT INSTRUCTIONS - HE
Colleen Samaniego,    It was a pleasure to see you today at the [facility].     My recommendations after this visit include:    vikki Lima MD, FACC, VINAY

## 2021-06-13 NOTE — PROGRESS NOTES
"Cardiology Progress Note    Assessment:  Coronary artery disease status post coronary artery bypass graft surgery in 2016, stable  Hyperlipidemia on Crestor  Obesity  History of DVT and PE      Plan:  Check lipid profile today, adjust dose of Crestor accordingly  I encouraged her to develop exercise routine.  She should walk or ride bicycle for 20-30 minutes 5 times a week.  Routine follow-up in 1 year    Subjective:   This is 67 y.o. female who comes in today for follow-up visit.  She reports the previously described chest pains and heart palpitation has resolved.  She denies any new complaints.  She is tolerating Crestor without any side effects.  She recently moved to a new home in [place].  She did a lot of walking, packing and unpacking.  She has not had any chest discomfort with those activities.    Review of Systems:   General: Weight Gain  Eyes: Visual Distubance  Ears/Nose/Throat: Hearing Loss  Lungs: WNL  Heart: Arm Pain, Shortness of Breath with activity, Leg Swelling  Stomach: WNL  Bladder: WNL  Muscle/Joints: Joint Pain, Muscle Weakness, Muscle Pain  Skin: WNL  Nervous System: Daytime Sleepiness  Mental Health: WNL     Blood: Easy Bruising    Objective:   /66 (Patient Site: Left Arm, Patient Position: Sitting, Cuff Size: Adult Large)  Pulse 60  Resp 18  Ht 5' 1\" (1.549 m)  Wt (!) 232 lb (105.2 kg)  SpO2 96%  BMI 43.84 kg/m2  Physical Exam:  GENERAL: no distress  NECK: No JVD  LUNGS: Clear to auscultation.  CARDIAC: regular rhythm, S1 & S2 normal.  No heaves, thrills, gallops or murmurs.  ABDOMEN: flat, negative hepatosplenomegaly, soft and non-tender.  EXTREMITIES: No evidence of cyanosis, clubbing or edema.    Current Outpatient Prescriptions   Medication Sig Dispense Refill     acetaminophen (TYLENOL) 500 MG tablet Take 1,000 mg by mouth every 6 (six) hours as needed for pain.       aspirin 81 mg chewable tablet Chew 81 mg daily.       calcium carbonate (OS-ALECIA) 600 mg (1,500 mg) tablet Take " 600 mg by mouth daily with lunch.        metoprolol tartrate (LOPRESSOR) 25 MG tablet Take 1 tablet (25 mg total) by mouth 2 (two) times a day. 180 tablet 2     multivitamin with minerals (THERA-M) 9 mg iron-400 mcg Tab tablet Take 1 tablet by mouth daily.       omeprazole (PRILOSEC) 20 MG capsule Take 1 capsule (20 mg total) by mouth every other day. 45 capsule 2     rosuvastatin (CRESTOR) 20 MG tablet Take 1 tablet (20 mg total) by mouth at bedtime. 30 tablet 12     No current facility-administered medications for this visit.        Cardiographics:    Coronary angio: May 2017      LM mild dz  LAD severe dz mid with patent LIMA, small diagona  Circ severe dz in OM with patnet SVG  RCA severe dz in mid with patent SVG  LVEDP 8mmHg  Aortic root mildly enlarged       Patient began having severe chest pain when laying flat, worse with direct pressure over sternal incision site.      Imp/plan:  1. Chest pain - musculoskeletal -related to either incisino or to statin - schedule CTnon contrast of sternum, follow up with CT surgery in clinic, hold statin for four weeks and rechalleng  2. CAD s/p CABG 3 vessel all grafts patent    Echo: April 2016  Normal left ventricular size and systolic function.   Left ventricular ejection fraction is visually estimated to be 65%.   No significant valvular abnormalities.      Holter: July 2017  The predominant rhythm is sinus rhythm which is present throughout the monitoring period.  Rates range from 52 bpm to 160 bpm.  There were no significant pauses or bradycardia.     Only for atrial premature beats were present over 24 hours.    No ventricular beats were present.     Symptoms:  Symptoms of Dizziness were associated with Sinus rhythm 83 bpm.  Symptoms of shortness of breath were associated with sinus tachycardia rate 104 and 128 bpm.  Symptoms of chest pressure was associated with sinus rhythm rate 91 bpm, all without ectopy  Lab Results:       Lab Results   Component Value Date     CHOL 155 05/24/2017    CHOL 164 12/01/2016    CHOL 188 07/26/2016     Lab Results   Component Value Date    HDL 36 (L) 05/24/2017    HDL 34 (L) 12/01/2016    HDL 40 (L) 07/26/2016     Lab Results   Component Value Date    LDLCALC 93 05/24/2017    LDLCALC 99 12/01/2016    LDLCALC 108 07/26/2016     Lab Results   Component Value Date    TRIG 131 05/24/2017    TRIG 156 (H) 12/01/2016    TRIG 202 (H) 07/26/2016     No components found for: CHOLHDL  BNP   Date Value Ref Range Status   05/02/2017 42 0 - 114 pg/mL Final       Kyler (Devan)  MD Janie

## 2021-06-13 NOTE — PROGRESS NOTES
Subjective: This patient comes in for evaluation she is an 67-year-old female.  She had an injury on August 6 she fell over a railroad tie in her garage.  She injured her third finger at the MCP joint is  to palpation I did get an x-ray of her hand.    She also had a incident where a heavy storage tub landed on her right foot over the arch.  She has some mid forefoot pain    Patient denies any shortness of breath or chest pain she did have evaluation regarding coronary artery disease she has had previous bypass she had an angiogram 7/7/2017 which showed patency of the grafts.    Also had an echocardiogram.    She was wondering if she needed antibiotics for dental procedure she had some rheumatic fever as a child.    She has no history or evidence of any infective endocarditis.  She has no murmur on exam and she has a normal echo so I do not think she needs that.    No additional concerns or issues    Tobacco status: She  reports that she quit smoking about 15 years ago. She has never used smokeless tobacco.    Patient Active Problem List    Diagnosis Date Noted     Hx of CABG 06/02/2017     Dyslipidemia      Exertional angina 05/02/2017     GERD (gastroesophageal reflux disease) 05/10/2016     History of DVT (deep vein thrombosis) 05/05/2016     Thrombocytopenia 05/05/2016     CAD (coronary artery disease) 04/29/2016     S/P CABG x 3      Unstable angina pectoris 04/28/2016     History of pulmonary embolism      Coronary artery disease due to calcified coronary lesion 04/15/2016     Acute pulmonary embolism- moderate bilateral 09/11/2015     Acute respiratory failure with hypoxia 09/11/2015     Left leg DVT 09/11/2015     Acute blood loss anemia 09/11/2015     S/P knee replacement 09/08/2015     Hypercholesterolemia      Rheumatic fever      Feelings Of Urinary Urgency      Rheumatic Fever      Atypical chest pain      Hyperlipidemia      Obstructive Sleep Apnea      Acute Pharyngitis        Current  Outpatient Prescriptions   Medication Sig Dispense Refill     acetaminophen (TYLENOL) 500 MG tablet Take 1,000 mg by mouth every 6 (six) hours as needed for pain.       aspirin 81 mg chewable tablet Chew 81 mg daily.       calcium carbonate (OS-ALECIA) 600 mg (1,500 mg) tablet Take 600 mg by mouth daily with lunch.        metoprolol tartrate (LOPRESSOR) 25 MG tablet Take 1 tablet (25 mg total) by mouth 2 (two) times a day. 180 tablet 2     multivitamin with minerals (THERA-M) 9 mg iron-400 mcg Tab tablet Take 1 tablet by mouth daily.       omeprazole (PRILOSEC) 20 MG capsule Take 1 capsule (20 mg total) by mouth every other day. 45 capsule 2     rosuvastatin (CRESTOR) 20 MG tablet Take 1 tablet (20 mg total) by mouth at bedtime. 30 tablet 12     No current facility-administered medications for this visit.        ROS: Review of systems negative other than as outlined above    Objective:    /60 (Patient Site: Left Arm, Patient Position: Sitting, Cuff Size: Adult Large)  Pulse 68  Temp 97.2  F (36.2  C) (Oral)   Resp 20  Wt (!) 229 lb (103.9 kg)  BMI 43.27 kg/m2  Body mass index is 43.27 kg/(m^2).      General appearance no acute distress    Vital signs are stable afebrile    Lungs are clear no rales rhonchi heart was regular no murmur    Extremities she has tenderness in through the right hand MCP joint third.  X-ray was negative for fracture    Right foot with tenderness over the arch anteriorly/dorsally, there was slight edema but no redness or warmth.  X-ray was negative for fracture.    Assessment:  1. Foot pain  XR Foot Right 3 or More VWS   2. Hand pain, left  XR Hand Left 3 or More VWS     Contusion with hand and foot pain as outlined.  No signs of fracture monitor    Flu shot given    Negative echo no heart murmur, no need for dental prophylaxis antibiotics    3.  Coronary artery disease stable patent grafts from angiogram 7/7/2017    Plan: As discussed above    This transcription uses voice  recognition software, which may contain typographical errors.

## 2021-06-13 NOTE — PROGRESS NOTES
[facility]  [address] [facility] [address]  Dept: [phone]  Dept Fax: [phone]  Primary Provider: Kehinde Maddox DO  Pre-op Performing Provider: KEHINDE MADDOX    PREOPERATIVE EVALUATION:  Today's date: 11/11/2020    Colleen Samaniego is a 70 y.o. female who presents for a preoperative evaluation.    Surgical Information:  Surgery/Procedure: Phacoemulsification/Intraocular lens left and right  Surgery Location: [facility]  Surgeon: Dr. Lerner  Surgery Date: 12/1/20 and 12/15/20  Time of Surgery: TBD  Where patient plans to recover: At home with family  Fax number for surgical facility: [phone]    Type of Anesthesia Anticipated: Local with MAC    Subjective     HPI related to upcoming procedure:     Scheduled for cataract surgery with [facility] in December for staggered procedure both eyes. Both eyes now symptomatic and ready for surgery.    HM: will do second Shingrix at pharmacy.     Preop Questions 11/11/2020   Have you ever had a heart attack or stroke? No   Have you ever had surgery on your heart or blood vessels, such as a stent placement, a coronary artery bypass, or surgery on an artery in your head, neck, heart, or legs? YES - CABG x 3 in 2016   Do you have chest pain with activity? No   Do you have a history of  heart failure? No   Do you currently have a cold, bronchitis or symptoms of other infection? No   Do you have a cough, shortness of breath, or wheezing? No   Do you or anyone in your family have previous history of blood clots? YES - Hx DVT and PE in 2015, provoked s/p left knee arthroplasty    Do you or does anyone in your family have a serious bleeding problem such as prolonged bleeding following surgeries or cuts? No   Have you ever had problems with anemia or been told to take iron pills? No   Have you had any abnormal blood loss such as black, tarry or bloody stools, or abnormal vaginal bleeding? No   Have you  ever had a blood transfusion? No   Are you willing to have a blood transfusion if it is medically needed before, during, or after your surgery? Yes   Have you or any of your relatives ever had problems with anesthesia? YES - With hand surgery in 1979, took a while to come around after surgery. Had some paranoid thoughts after her CABG, was convinced people were trying to kill her. Other surgeries without complications.   Do you have sleep apnea, excessive snoring or daytime drowsiness? YES - Uses CPAP   Do you have a CPAP machine? Yes   Do you have any artifical heart valves or other implanted medical devices like a pacemaker, defibrillator, or continuous glucose monitor? No   Do you have artificial joints? YES - bilateral knees    Are you allergic to latex? No     Health Care Directive:  Patient does not have a Health Care Directive or Living Will: Discussed advance care planning with patient; information given to patient to review.  Has been thinking about healthcare wishes. Oldest son is in [place]. Oldest grand daughter is her emergency contact.     Preoperative Review of :    reviewed - no record of controlled substances prescribed.    See problem list for active medical problems.  Problems all longstanding and stable, except as noted/documented.  See ROS for pertinent symptoms related to these conditions.      Review of Systems  Constitutional, neuro, ENT, endocrine, pulmonary, cardiac, gastrointestinal, genitourinary, musculoskeletal, integument and psychiatric systems are negative, except as otherwise noted.      Patient Active Problem List    Diagnosis Date Noted     Dyspnea on exertion 10/07/2019     Morbid obesity (H) 07/17/2019     Age-related osteoporosis without current pathological fracture 05/21/2019     Hypercholesteremia      Exertional angina (H) 05/02/2017     GERD (gastroesophageal reflux disease) 05/10/2016     History of DVT (deep vein thrombosis) 05/05/2016     S/P CABG x 3      History  of pulmonary embolism      Coronary artery disease due to calcified coronary lesion 04/15/2016     S/P knee replacement 2015     Feelings Of Urinary Urgency      History of rheumatic fever      Obstructive sleep apnea      Past Medical History:   Diagnosis Date     Acute blood loss anemia 2015     Acute pulmonary embolism- moderate bilateral 2015     Acute respiratory failure with hypoxia (H) 2015     Arthritis      Coronary artery disease      DVT (deep venous thrombosis) (H)      Family history of myocardial infarction      GERD (gastroesophageal reflux disease)      High cholesterol      History of anesthesia complications     slow to wake up     History of blood clots      Hypercholesterolemia      Left leg DVT (H) 2015    Postop Left TKA     PE (pulmonary thromboembolism) (H)      Pulmonary embolism, bilateral (H) 2015    Postop Left TKA     Rheumatic fever          Sleep apnea, obstructive     uses CPAP     Thrombocytopenia (H) 2016     Past Surgical History:   Procedure Laterality Date     APPENDECTOMY       BREAST CYST ASPIRATION       CARDIAC CATHETERIZATION      prior to CABG     CARDIAC CATHETERIZATION  2017    no intervention      SECTION       x2     CORONARY ARTERY BYPASS GRAFT N/A 2016    Procedure: CORONARY ARTERY BYPASS GRAFT X 3, ENDOSCOPIC VEIN HARVEST, INTERNAL MAMMARY ARTERY, ANESTHESIA TRANSESOPHAGEAL ECHOCARDIOGRAM ;  Surgeon: Rancho Rodas MD;  Location: [facility] OR;  Service:      HYSTERECTOMY       JOINT REPLACEMENT      left knee     OOPHORECTOMY       CO CATH PLACEMENT & NJX CORONARY ART ANGIO IMG S&I N/A 2017    Procedure: Coronary Angiogram;  Surgeon: David Youngblood MD;  Location: [facility] Cath Lab;  Service: Cardiology     CO CATH PLMT L HRT & ARTS W/NJX & ANGIO IMG S&I Left 2017    Procedure: Left Heart Catheterization Without Left Ventriculogram;  Surgeon: David Youngblood MD;   Location: [facility] Cath Lab;  Service: Cardiology     AZ TOTAL ABDOM HYSTERECTOMY      Description: Total Abdominal Hysterectomy;  Recorded: 11/01/2010;     AZ TOTAL KNEE ARTHROPLASTY Left 9/8/2015    Procedure: LEFT TOTAL KNEE ARTHROPLASTY;  Surgeon: Good Suggs MD;  Location: [facility];  Service: Orthopedics     AZ TOTAL KNEE ARTHROPLASTY Right 5/8/2018    Procedure:  RIGHT TOTAL KNEE ARTHROPLASTY COMPLEX BMI 46;  Surgeon: Good Suggs MD;  Location: [facility];  Service: Orthopedics     WRIST SURGERY Right      Current Outpatient Medications   Medication Sig Dispense Refill     acetaminophen (TYLENOL) 500 MG tablet Take 2 tablets (1,000 mg total) by mouth 3 (three) times a day. (Patient taking differently: Take 1,000 mg by mouth as needed.       )  0     alendronate (FOSAMAX) 70 MG tablet 1 TABLET ONCE EVERY 7 DAYS IN THE A.M. ON EMPTY STOMACH WITH FULL GLASS WATER 30 MINUTES BEFORE FOOD 12 tablet 3     aspirin 81 MG EC tablet Take 81 mg by mouth daily.       calcium carbonate (OS-ALECIA) 600 mg (1,500 mg) tablet Take 600 mg by mouth daily with lunch.        cholecalciferol, vitamin D3, 1,000 unit (25 mcg) tablet Take 1,000 Units by mouth 2 (two) times a day.       ezetimibe (ZETIA) 10 mg tablet Take 1 tablet (10 mg total) by mouth daily. 90 tablet 0     famotidine (PEPCID) 40 MG tablet Take 1 tablet (40 mg total) by mouth daily. 90 tablet 3     metoprolol tartrate (LOPRESSOR) 25 MG tablet TAKE 1 TABLET BY MOUTH TWICE A  tablet 1     multivitamin with minerals (THERA-M) 9 mg iron-400 mcg Tab tablet Take 1 tablet by mouth daily.       rosuvastatin (CRESTOR) 40 MG tablet Take 1 tablet (40 mg total) by mouth at bedtime. 90 tablet 0     No current facility-administered medications for this visit.        Allergies   Allergen Reactions     Aleve [Naproxen Sodium]      Causes acid reflux     Amoxicillin Hives     Atorvastatin      Annotation: myalgia       Ibuprofen Swelling     Face swelled  "and became red/ blotchy.  Can take aspirin without reaction     Simvastatin      Didn't work for her.  Did not lower cholesterol much       Social History     Tobacco Use     Smoking status: Former Smoker     Quit date: 2001     Years since quittin.9     Smokeless tobacco: Never Used   Substance Use Topics     Alcohol use: No      Family History   Problem Relation Age of Onset     Hypertension Mother      Heart disease Mother      Heart disease Father      Snoring Father      Stroke Sister      Heart disease Sister      Clotting disorder Sister      Factor V Leiden deficiency Sister      Clotting disorder Brother      Factor V Leiden deficiency Brother      Anesthesia problems Neg Hx      Breast cancer Neg Hx      Social History     Substance and Sexual Activity   Drug Use No        Objective     /66   Pulse 71   Ht 5' 1.25\" (1.556 m)   Wt (!) 250 lb 4 oz (113.5 kg)   BMI 46.90 kg/m    Physical Exam    GENERAL APPEARANCE: healthy, alert and no distress     EYES: EOMI, PERRL     HENT: ear canals and TM's normal and nose and mouth without ulcers or lesions     NECK: no adenopathy, no asymmetry, masses, or scars and thyroid normal to palpation     RESP: lungs clear to auscultation - no rales, rhonchi or wheezes     CV: regular rates and rhythm, no murmurs     ABDOMEN:  soft, non-tender, no palpable masses.      MS: no lower extremity deformities or edema     SKIN: no suspicious lesions or rashes     NEURO: No gross deficits present     PSYCH: mentation appears normal. and affect normal/bright     LYMPHATICS: No cervical adenopathy    Recent Labs   Lab Test 20  1109 01/15/20  0949 19  0804 19  0804   HGB 12.4 13.1   < > 11.9*    237  --  238     --   --  144   K 4.3  --   --  4.3   CREATININE 0.97  --   --  0.90    < > = values in this interval not displayed.        PRE-OP Diagnostics:   No labs were ordered during this visit.  No EKG required for low risk surgery " (cataract, skin procedure, breast biopsy, etc).    REVISED CARDIAC RISK INDEX (RCRI)   The patient has the following serious cardiovascular risks for perioperative complications:   - Coronary Artery Disease (MI, positive stress test, angina, Qs on EKG) = 1 point    RCRI INTERPRETATION: 1 point: Class II (low risk - 0.9% complication rate)       Assessment & Plan      The proposed surgical procedure is considered LOW risk.    Problem List Items Addressed This Visit     None      Visit Diagnoses     Preop general physical exam    -  Primary    Cataract of both eyes, unspecified cataract type        Counseling regarding advanced directives             We reviewed [place] [employer] advanced health care directive today and have given a copy to Colleen to complete. She states her granddaughter Shae is appointed to make medical decisions on her behalf if she is not able to speak for herself.     Colleen wasn't aware she needs COVID testing. Left voice mail with surgical center to see if we need to order COVID testing for patient or if surgeon will be ordering.    Risks and Recommendations:  The patient has the following additional risks and recommendations for perioperative complications:   - Morbid obesity (BMI >40)    Medication Instructions:  Patient is to take all scheduled medications on the day of surgery    RECOMMENDATION:  APPROVAL GIVEN to proceed with proposed procedure, without further diagnostic evaluation.    Signed Electronically by: Susan Maddox DO    Copy of this evaluation report is provided to requesting physician.    [facility] Pre[facility] Guidelines    Revised Cardiac Risk Index

## 2021-06-13 NOTE — PATIENT INSTRUCTIONS - HE

## 2021-06-14 NOTE — TELEPHONE ENCOUNTER
Refill Approved    Rx renewed per Medication Renewal Policy. Medication was last renewed on 3/5/20, last OV 11/11/20.    Awilda Colunga, [facility] Triage/Med Refill 1/10/2021     Requested Prescriptions   Pending Prescriptions Disp Refills     famotidine (PEPCID) 40 MG tablet [Pharmacy Med Name: FAMOTIDINE 40 MG TABLET] 90 tablet 3     Sig: TAKE 1 TABLET BY MOUTH EVERY DAY       GI Medications Refill Protocol Passed - 1/9/2021  5:22 PM        Passed - PCP or prescribing provider visit in last 12 or next 3 months.     Last office visit with prescriber/PCP: 3/5/2020 Susan Maddox DO OR same dept: 3/5/2020 Susan Maddox DO OR same specialty: 3/5/2020 Susan Maddox DO  Last physical: 11/11/2020 Last MTM visit: Visit date not found   Next visit within 3 mo: Visit date not found  Next physical within 3 mo: Visit date not found  Prescriber OR PCP: Susan Maddox DO  Last diagnosis associated with med order: 1. Gastroesophageal reflux disease without esophagitis  - famotidine (PEPCID) 40 MG tablet [Pharmacy Med Name: FAMOTIDINE 40 MG TABLET]; TAKE 1 TABLET BY MOUTH EVERY DAY  Dispense: 90 tablet; Refill: 3    If protocol passes may refill for 12 months if within 3 months of last provider visit (or a total of 15 months).                             Is This A New Presentation, Or A Follow-Up?: Abdominal Aesthetic Deformity

## 2021-06-15 PROBLEM — I20.89 EXERTIONAL ANGINA (H): Status: ACTIVE | Noted: 2017-05-02

## 2021-06-16 PROBLEM — E66.01 MORBID OBESITY (H): Status: ACTIVE | Noted: 2019-07-17

## 2021-06-16 PROBLEM — R06.09 DYSPNEA ON EXERTION: Status: ACTIVE | Noted: 2019-10-07

## 2021-06-16 PROBLEM — M81.0 AGE-RELATED OSTEOPOROSIS WITHOUT CURRENT PATHOLOGICAL FRACTURE: Status: ACTIVE | Noted: 2019-05-21

## 2021-06-17 NOTE — ANESTHESIA PROCEDURE NOTES
Peripheral Block    Patient location during procedure: pre-op  Start time: 5/8/2018 11:26 AM  End time: 5/8/2018 11:30 AM  post-op analgesia per surgeon order as noted in medical record  Staffing:  Performing  Anesthesiologist: KAYLEE VÁSQUEZ  Preanesthetic Checklist  Completed: patient identified, site marked, risks, benefits, and alternatives discussed, timeout performed, consent obtained, airway assessed, oxygen available, suction available, emergency drugs available and hand hygiene performed  Peripheral Block  Block type: saphenous, adductor canal block  Prep: ChloraPrep  Patient position: supine  Patient monitoring: cardiac monitor, continuous pulse oximetry, heart rate and blood pressure  Laterality: right  Injection technique: ultrasound guided            Needle  Needle type: short-bevel   Needle gauge: 20G  Needle length: 6 in  no peripheral nerve catheter placed  Assessment  Injection assessment: negative aspiration for heme, no paresthesia on injection, incremental injection and no difficulty with injection

## 2021-06-17 NOTE — ANESTHESIA POSTPROCEDURE EVALUATION
Patient: Colleen Samaniego   RIGHT TOTAL KNEE ARTHROPLASTY COMPLEX BMI 46  Anesthesia type: spinal    Patient location: PACU  Last vitals:   Vitals:    05/08/18 1440   BP: 122/59   Pulse: 63   Resp: 16   Temp:    SpO2: 97%     Post vital signs: stable  Level of consciousness: awake and responds to simple questions  Post-anesthesia pain: pain controlled  Post-anesthesia nausea and vomiting: no  Pulmonary: unassisted, return to baseline  Cardiovascular: stable and blood pressure at baseline  Hydration: adequate  Anesthetic events: no    QCDR Measures:  ASA# 11 - Elissa-op Cardiac Arrest: ASA11B - Patient did NOT experience unanticipated cardiac arrest  ASA# 12 - Elissa-op Mortality Rate: ASA12B - Patient did NOT die  ASA# 13 - PACU Re-Intubation Rate: NA - No ETT / LMA used for case  ASA# 10 - Composite Anes Safety: ASA10A - No serious adverse event    Additional Notes:

## 2021-06-17 NOTE — ANESTHESIA PREPROCEDURE EVALUATION
Anesthesia Evaluation      Patient summary reviewed   History of anesthetic complications ([place])     Airway   Mallampati: II  Neck ROM: full   Pulmonary - normal exam    breath sounds clear to auscultation  (+) sleep apnea on CPAP, ,                          Cardiovascular - normal exam  (+) CAD, CABG/stent, angina (hx of, none since bypass), ,     ECG reviewed  Rhythm: regular  Rate: normal,         Neuro/Psych - negative ROS     Endo/Other    (+) obesity,      GI/Hepatic/Renal    (+) GERD well controlled,             Dental                         Anesthesia Plan  Planned anesthetic: spinal  PNB for post op pain control  ASA 3     Anesthetic plan and risks discussed with: patient    Post-op plan: routine recovery

## 2021-06-17 NOTE — ANESTHESIA PROCEDURE NOTES
Spinal Block    Patient location during procedure: OR  Start time: 5/8/2018 11:49 AM  End time: 5/8/2018 11:53 AM  Reason for block: primary anesthetic    Staffing:  Performing  Anesthesiologist: KAYLEE VÁSQUEZ    Preanesthetic Checklist  Completed: patient identified, risks, benefits, and alternatives discussed, timeout performed, consent obtained, airway assessed, oxygen available, suction available, emergency drugs available and hand hygiene performed  Spinal Block  Patient position: sitting  Prep: ChloraPrep and site prepped and draped  Patient monitoring: heart rate, cardiac monitor, continuous pulse ox and blood pressure  Approach: midline  Location: L3-4  Injection technique: single-shot  Needle type: pencil-tip   Needle gauge: 25 G

## 2021-06-17 NOTE — PROGRESS NOTES
Subjective: Patient comes in for evaluation.  This patient is a 68-year-old female she has had previous coronary artery bypass graft.  She is on Crestor 40 mg a day last  this fall.  Her cardiologist, Dr. Lima, increased her to 40 mg daily on the Crestor I did recheck that today she has not eaten for about 6-8 hours lipid panel AST ALT pending    No myalgias symptoms.    Patient has had some worsening of her right knee pain.  She had a left total knee arthroplasty in September 2015 with Dr. Suggs.  She did have a postop DVT.    She has been followed there since then saw the nurse practitioner in September 2016 for advanced medial osteoarthritis on the right knee and had a cortisone injection.  She had helped for about 2 months    Now she is having pain at rest she says it is hard to find a comfortable position.    We discussed cortisone injection or possibly Synvisc injection.    At this point she feels those are only temporary and wants to proceed with the knee replacement.    I will have her go back and see Dr. Durán and then see Dr. Lima soon after that for preop clearance.    She did have the postop DVT and has the history of the coronary artery disease with bypass grafts.    She denies any shortness of breath or chest pressure.    Tobacco status: She  reports that she quit smoking about 16 years ago. She has never used smokeless tobacco.    Patient Active Problem List    Diagnosis Date Noted     Hx of CABG 06/02/2017     Dyslipidemia      Exertional angina 05/02/2017     GERD (gastroesophageal reflux disease) 05/10/2016     History of DVT (deep vein thrombosis) 05/05/2016     Thrombocytopenia 05/05/2016     CAD (coronary artery disease) 04/29/2016     S/P CABG x 3      Unstable angina pectoris 04/28/2016     History of pulmonary embolism      Coronary artery disease due to calcified coronary lesion 04/15/2016     Acute pulmonary embolism- moderate bilateral 09/11/2015     Acute respiratory  failure with hypoxia 09/11/2015     Left leg DVT 09/11/2015     Acute blood loss anemia 09/11/2015     S/P knee replacement 09/08/2015     Hypercholesterolemia      Rheumatic fever      Feelings Of Urinary Urgency      Rheumatic Fever      Atypical chest pain      Hyperlipidemia      Obstructive Sleep Apnea      Acute Pharyngitis        Current Outpatient Prescriptions   Medication Sig Dispense Refill     acetaminophen (TYLENOL) 500 MG tablet Take 1,000 mg by mouth every 6 (six) hours as needed for pain.       aspirin 81 mg chewable tablet Chew 81 mg daily.       calcium carbonate (OS-ALECIA) 600 mg (1,500 mg) tablet Take 600 mg by mouth daily with lunch.        metoprolol tartrate (LOPRESSOR) 25 MG tablet Take 1 tablet (25 mg total) by mouth 2 (two) times a day. 180 tablet 2     multivitamin with minerals (THERA-M) 9 mg iron-400 mcg Tab tablet Take 1 tablet by mouth daily.       omeprazole (PRILOSEC) 20 MG capsule Take 1 capsule (20 mg total) by mouth every other day. 45 capsule 2     rosuvastatin (CRESTOR) 40 MG tablet Take 1 tablet (40 mg total) by mouth at bedtime. 90 tablet 3     No current facility-administered medications for this visit.        ROS:   10 point review of systems negative other than as outlined above    I reviewed previous records from orthopedist as well as cardiology please see above.    Objective:    /54 (Patient Site: Left Arm, Patient Position: Sitting, Cuff Size: Adult Large)  Pulse 60  Wt (!) 242 lb (109.8 kg)  BMI 45.73 kg/m2  Body mass index is 45.73 kg/(m^2).      General appearance no acute distress    Last     HEENT neck negative oropharynx clear    Lungs are clear no rales or rhonchi heart was regular, rate in the 60-70 range    No significant edema    No calf swelling or redness, on either leg    Previous left knee replacement and left leg DVT    Right knee with joint line pain medially.  No effusion.        Assessment:  1. Primary osteoarthritis of right knee  Lipid  [place] FASTING    AST (SGOT)    ALT (SGPT)    Ambulatory referral to Orthopedic Surgery   2. Coronary artery disease due to calcified coronary lesion  Ambulatory referral to Cardiology   3. Left leg DVT  Ambulatory referral to Cardiology   4. Status post left knee replacement  Ambulatory referral to Cardiology   5. S/P CABG x 3       Primary osteoarthritis of right knee referral back to orthopedic surgery.    History of coronary artery disease with hyperlipidemia await lipid panel on Crestor 40 mg a day    Previous left total knee replacement postop DVT    Status post coronary artery bypass    Plan: As above will await consultations and will plan to see her back for her preop.    I will call her with results on the lipids    This transcription uses voice recognition software, which may contain typographical errors.

## 2021-06-17 NOTE — PROGRESS NOTES
"Cardiology Progress Note    Assessment:  Coronary artery disease status post coronary artery bypass graft surgery in 2016, stable  Chronic mild incisional chest discomfort with stable sternum  Hypercholesterolemia, on Crestor  Obesity  History of DVT and PE  DJD right knee    Plan:  She appears to be very stable from cardiac standpoint.  She can undergo orthopedic surgery at low risk of cardiac complications.  No medication changes today  Follow-up in 1 year    Subjective:   This is 68 y.o. female who comes in today for cardiology follow-up and preop cardiac evaluation.  She is scheduled to undergo right knee replacement surgery 2 weeks from now.  She denies any chest pain at rest.  She did feel some shortness of breath and chest tightness when she was shoveling heavy snow 2 weeks ago.  She denies weight gain, PND, orthopnea.  She has not had heart palpitations or syncope.    Review of Systems:   General: Weight Gain  Eyes: WNL  Ears/Nose/Throat: Hearing Loss  Lungs: Shortness of Breath, Snoring  Heart: Chest Pain, Shortness of Breath with activity, Irregular Heartbeat  Stomach: WNL  Bladder: WNL  Muscle/Joints: Joint Pain, Muscle Weakness, Muscle Pain  Skin: Poor Wound Healing  Nervous System: WNL  Mental Health: WNL     Blood: Easy Bruising    Objective:   /72 (Patient Site: Right Arm, Patient Position: Sitting, Cuff Size: Adult Large)  Pulse 72  Resp 16  Ht 5' 1\" (1.549 m)  Wt (!) 241 lb (109.3 kg)  BMI 45.54 kg/m2  Physical Exam:  GENERAL: no distress  NECK: No JVD  LUNGS: Clear to auscultation.  CARDIAC: regular rhythm, S1 & S2 normal.  No heaves, thrills, gallops or murmurs.  ABDOMEN: flat, negative hepatosplenomegaly, soft and non-tender.  EXTREMITIES: No evidence of cyanosis, clubbing or edema.    Current Outpatient Prescriptions   Medication Sig Dispense Refill     acetaminophen (TYLENOL) 500 MG tablet Take 1,000 mg by mouth every 6 (six) hours as needed for pain.       aspirin 81 mg chewable " tablet Chew 81 mg daily.       calcium carbonate (OS-ALECIA) 600 mg (1,500 mg) tablet Take 600 mg by mouth daily with lunch.        metoprolol tartrate (LOPRESSOR) 25 MG tablet Take 1 tablet (25 mg total) by mouth 2 (two) times a day. 180 tablet 2     multivitamin with minerals (THERA-M) 9 mg iron-400 mcg Tab tablet Take 1 tablet by mouth daily.       omeprazole (PRILOSEC) 20 MG capsule Take 1 capsule (20 mg total) by mouth every other day. 45 capsule 3     rosuvastatin (CRESTOR) 40 MG tablet Take 1 tablet (40 mg total) by mouth at bedtime. 90 tablet 3     No current facility-administered medications for this visit.        Cardiographics:    EC2018 read by me sinus rhythm incomplete right bundle branch block no changes from 2017    Coronary angio: May 2017      LM mild dz  LAD severe dz mid with patent LIMA, small diagonal  Circ severe dz in OM with patnet SVG  RCA severe dz in mid with patent SVG  LVEDP 8mmHg  Aortic root mildly enlarged       Patient began having severe chest pain when laying flat, worse with direct pressure over sternal incision site.      Imp/plan:  1. Chest pain - musculoskeletal -related to either incisino or to statin - schedule CTnon contrast of sternum, follow up with CT surgery in clinic, hold statin for four weeks and rechalleng  2. CAD s/p CABG 3 vessel all grafts patent     Echo: 2016  Normal left ventricular size and systolic function.   Left ventricular ejection fraction is visually estimated to be 65%.   No significant valvular abnormalities.      Holter: 2017  The predominant rhythm is sinus rhythm which is present throughout the monitoring period.  Rates range from 52 bpm to 160 bpm.  There were no significant pauses or bradycardia.      Only for atrial premature beats were present over 24 hours.    No ventricular beats were present.      Symptoms:  Symptoms of Dizziness were associated with Sinus rhythm 83 bpm.  Symptoms of shortness of breath were associated with  sinus tachycardia rate 104 and 128 bpm.  Symptoms of chest pressure was associated with sinus rhythm rate 91 bpm, all without ectopy    Lab Results:       Lab Results   Component Value Date    CHOL 184 04/02/2018    CHOL 217 (H) 10/09/2017    CHOL 155 05/24/2017     Lab Results   Component Value Date    HDL 45 (L) 04/02/2018    HDL 42 (L) 10/09/2017    HDL 36 (L) 05/24/2017     Lab Results   Component Value Date    LDLCALC 104 04/02/2018    LDLCALC 135 (H) 10/09/2017    LDLCALC 93 05/24/2017     Lab Results   Component Value Date    TRIG 177 (H) 04/02/2018    TRIG 202 (H) 10/09/2017    TRIG 131 05/24/2017     No components found for: CHOLHDL  BNP   Date Value Ref Range Status   05/02/2017 42 0 - 114 pg/mL Final       Kyler (Devan)  MD Janie

## 2021-06-17 NOTE — ANESTHESIA CARE TRANSFER NOTE
Last vitals:   Vitals:    05/08/18 1400   BP: 106/56   Pulse: 84   Resp: 16   Temp: 36.7  C (98.1  F)   SpO2: 94%     Patient's level of consciousness is awake  Spontaneous respirations: yes  Maintains airway independently: yes  Dentition unchanged: yes  Oropharynx: oropharynx clear of all foreign objects    QCDR Measures:  ASA# 20 - Surgical Safety Checklist: WHO surgical safety checklist completed prior to induction  PQRS# 430 - Adult PONV Prevention: 4558F - Pt received => 2 anti-emetic agents (different classes) preop & intraop  ASA# 8 - Peds PONV Prevention: NA - Not pediatric patient, not GA or 2 or more risk factors NOT present  PQRS# 424 - Elissa-op Temp Management: 4559F - At least one body temp DOCUMENTED => 35.5C or 95.9F within required timeframe  PQRS# 426 - PACU Transfer Protocol: - Transfer of care checklist used  ASA# 14 - Acute Post-op Pain: ASA14B - Patient did NOT experience pain >= 7 out of 10

## 2021-06-17 NOTE — PATIENT INSTRUCTIONS - HE
Patient Instructions by Susan Maddox DO at 5/21/2019  8:40 AM     Author: Susan Maddox DO Service: -- Author Type: Physician    Filed: 5/21/2019  8:58 AM Encounter Date: 5/21/2019 Status: Addendum    : Susan Maddox DO (Physician)    Related Notes: Original Note by Susan Maddox DO (Physician) filed at 5/21/2019  8:55 AM       Add vitamin D 2000 IU daily.   Patient Education     Osteoporosis Medications: Bisphosphonates  Depending on your needs, your healthcare provider may prescribe medicines to prevent or treat osteoporosis.    Bisphosphonates  Several medicines make up the class of drugs known as bisphosphonates. Bisphosphonates are the most common type of medicine used to help prevent and treat bone loss. Bisphosphonates are given in pill or injectable form as an IV infusion. They must be taken exactly as directed. Benefits may include:    Reducing bone loss    Increasing bone density in the hip and spine    Reducing risk of fractures in the spine, hip, and wrist  Side effects may include:    Heartburn    Nausea    Abdominal pain    Bone or muscle pain  Taking bisphosphonates pills  Always read medicine information closely. Certain bisphosphonates must be taken:    On an empty stomach.    With a full glass of water (8 oz) first thing in the morning.    At least 30 minutes to one hour before any food, drink, or other medications.    While sitting or standing. You should not lie down for at least 30 minutes after taking the medicine.  Newer medicines can be taken weekly or monthly. Talk with your doctor to find out which one is right for you.   Date Last Reviewed: 10/11/2015    4213-7052 [employer]. [address] [place] [address], [place] [address]. All rights reserved. This information is not intended as a substitute for professional medical care. Always follow your healthcare professional's instructions.           Patient Education     Osteoporosis Medications: Bisphosphonates  Depending  on your needs, your healthcare provider may prescribe medicines to prevent or treat osteoporosis.    Bisphosphonates  Several medicines make up the class of drugs known as bisphosphonates. Bisphosphonates are the most common type of medicine used to help prevent and treat bone loss. Bisphosphonates are given in pill or injectable form as an IV infusion. They must be taken exactly as directed. Benefits may include:    Reducing bone loss    Increasing bone density in the hip and spine    Reducing risk of fractures in the spine, hip, and wrist  Side effects may include:    Heartburn    Nausea    Abdominal pain    Bone or muscle pain  Taking bisphosphonates pills  Always read medicine information closely. Certain bisphosphonates must be taken:    On an empty stomach.    With a full glass of water (8 oz) first thing in the morning.    At least 30 minutes to one hour before any food, drink, or other medications.    While sitting or standing. You should not lie down for at least 30 minutes after taking the medicine.  Newer medicines can be taken weekly or monthly. Talk with your doctor to find out which one is right for you.   Date Last Reviewed: 10/11/2015    1988-1722 [employer]. [address] [place] [address]. All rights reserved. This information is not intended as a substitute for professional medical care. Always follow your healthcare professional's instructions.           Patient Education     Living with Osteoporosis: Preventing Fractures  If you have osteoporosis, you can do a lot to reduce its effect on your life. Knowing how to prevent fractures and spinal curvature can help you live more comfortably and safely with this disease.    Reducing your risk for fractures  The most common fracture sites in people with osteoporosis are the wrist, spine, and hip. These fractures are often caused by accidents and falls. All fractures are painful and may limit what you can do. But hip fractures  are very serious. They need surgery, and it can take months to recover. To reduce your risk for fractures:    Get regular exercise. Try walking, swimming, or weight training.    Eat foods that are rich in calcium, or take calcium supplements.    Make your home safe to help avoid accidents.    Take extra precautions not to fall in risky areas, such as icy sidewalks.  Understanding spinal fractures  Your spine is made up of many bones called vertebrae. Osteoporosis can cause the vertebrae in your spine to collapse. As a result, your upper back may arch forward, creating a curvature. Spine fractures may also result from back strain and bad posture. You will also lose height. Your lower spine must then adjust to keep your body balanced. This can cause back pain. To prevent or lessen these spinal changes:    Practice good posture.    Use proper techniques if you need to lift heavy objects.    Do back exercises to help your posture.    Lie on your back when you have pain.    Ask your healthcare provider about these and other ways to help your spine.  Date Last Reviewed: 10/17/2015    3182-9470 [employer]. [address] [place] [address], [place] [address]. All rights reserved. This information is not intended as a substitute for professional medical care. Always follow your healthcare professional's instructions.

## 2021-06-17 NOTE — PROGRESS NOTES
Preoperative Exam    Scheduled Procedure: Right Knee Replacement  Surgery Date:  5/8/2018  Surgery Location: [facility], fax [phone]    Surgeon:  Dr. Suggs    Assessment/Plan:     1. Preop examination  HM2(CBC w/o Differential)    INR    Hepatitis C Antibody (Anti-HCV)    XR Chest 2 Views    Td, Preservative Free (green label)   2. Left leg DVT      post op left TKA     3. S/P CABG x 3  Basic Metabolic Panel   4. Obstructive sleep apnea     5. History of pulmonary embolism      post op left TKA   6. Dyslipidemia     7. Primary osteoarthritis of right knee         Have you had prior anesthesia?: Yes   Have you or any family members had a previous anesthesia reaction: No   Do you or any family members have a history of a clotting or bleeding disorder?:  No  Cardiac Risk Assessment: no increased risk for major cardiac complications    Patient approved for surgery with general or local anesthesia.    Please Note:  Patient uses a CPAP machine.    Functional Status: Partially dependent  Patient plans to recover [facility]     Subjective:      Colleen Samaniego is a 68 y.o. female who presents for a preoperative consultation.  History of progressive right knee osteoarthritis, plan for replacement, has failed conservative treatment.    Had previous left total knee arthroplasty and did have postop DVT on the left with bilateral PE.  Patient will be on warfarin postop.    Also has a history of coronary artery disease with coronary artery bypass graft in the past.  She sees Dr. Lima, cardiology.  He saw her on 4/25/2018 in she is cleared for surgery.  Please see his notes.  EKG was obtained    Patient's had no chest pain shortness of breath, no active symptoms.    All other systems reviewed and are negative, other than those listed in the HPI.    Pertinent History  Do you have difficulty breathing or chest pain after walking up a flight of stairs:  Yes but this is been stable  History of  obstructive sleep apnea: Yes, uses CPAP  Steroid use in the last 6 months: No  Frequent Aspirin/NSAID use: Yes, on aspirin 1 a day  Prior Blood Transfusion: possibly  Prior Blood Transfusion Reaction: No  If for some reason prior to, during or after the procedure, if it is medically indicated, would you be willing to have a blood transfusion?:  There is no transfusion refusal.    Current Outpatient Prescriptions   Medication Sig Dispense Refill     acetaminophen (TYLENOL) 500 MG tablet Take 1,000 mg by mouth every 6 (six) hours as needed for pain.       aspirin 81 mg chewable tablet Chew 81 mg daily.       calcium carbonate (OS-ALECIA) 600 mg (1,500 mg) tablet Take 600 mg by mouth daily with lunch.        metoprolol tartrate (LOPRESSOR) 25 MG tablet Take 1 tablet (25 mg total) by mouth 2 (two) times a day. 180 tablet 2     multivitamin with minerals (THERA-M) 9 mg iron-400 mcg Tab tablet Take 1 tablet by mouth daily.       omeprazole (PRILOSEC) 20 MG capsule Take 1 capsule (20 mg total) by mouth every other day. 45 capsule 3     rosuvastatin (CRESTOR) 40 MG tablet Take 1 tablet (40 mg total) by mouth at bedtime. 90 tablet 3     No current facility-administered medications for this visit.         Allergies   Allergen Reactions     Aleve [Naproxen Sodium]      Causes acid reflux     Amoxicillin Hives     Atorvastatin      Annotation: myalgia       Ibuprofen Swelling     Face swelled and became red/ blotchy.  Can take aspirin without reaction     Simvastatin      Didn't work for her.  Did not lower cholesterol much       Patient Active Problem List   Diagnosis     Rheumatic Fever     Atypical chest pain     Hyperlipidemia     Obstructive sleep apnea     Acute Pharyngitis     Feelings Of Urinary Urgency     Hypercholesterolemia     Rheumatic fever     S/P knee replacement     Acute pulmonary embolism- moderate bilateral     Acute respiratory failure with hypoxia     Left leg DVT     Acute blood loss anemia     Coronary  artery disease due to calcified coronary lesion     Unstable angina pectoris     History of pulmonary embolism     CAD (coronary artery disease)     S/P CABG x 3     History of DVT (deep vein thrombosis)     Thrombocytopenia     GERD (gastroesophageal reflux disease)     Exertional angina     Dyslipidemia     Hx of CABG       Past Medical History:   Diagnosis Date     Arthritis      Coronary artery disease      DVT (deep venous thrombosis)      Family history of myocardial infarction      GERD (gastroesophageal reflux disease)      High cholesterol      History of anesthesia complications     slow to wake up     Hypercholesterolemia      Left leg DVT 2015    Postop Left TKA     PE (pulmonary thromboembolism)      Pulmonary embolism, bilateral 2015    Postop Left TKA     Rheumatic fever          Sleep apnea, obstructive     uses CPAP       Past Surgical History:   Procedure Laterality Date     APPENDECTOMY       BREAST CYST ASPIRATION       CARDIAC CATHETERIZATION      prior to CABG     CARDIAC CATHETERIZATION  2017    no intervention      SECTION       x2     CORONARY ARTERY BYPASS GRAFT N/A 2016    Procedure: CORONARY ARTERY BYPASS GRAFT X 3, ENDOSCOPIC VEIN HARVEST, INTERNAL MAMMARY ARTERY, ANESTHESIA TRANSESOPHAGEAL ECHOCARDIOGRAM ;  Surgeon: Rancho Rodas MD;  Location: [facility] OR;  Service:      HYSTERECTOMY       JOINT REPLACEMENT      left knee     OOPHORECTOMY       NM CATH PLACEMENT & NJX CORONARY ART ANGIO IMG S&I N/A 2017    Procedure: Coronary Angiogram;  Surgeon: David Youngblood MD;  Location: [facility] Cath Lab;  Service: Cardiology     NM CATH PLMT L HRT & ARTS W/NJX & ANGIO IMG S&I Left 2017    Procedure: Left Heart Catheterization Without Left Ventriculogram;  Surgeon: David Youngblood MD;  Location: [facility] Cath Lab;  Service: Cardiology     NM TOTAL ABDOM HYSTERECTOMY      Description: Total Abdominal Hysterectomy;   "Recorded: 11/01/2010;     MS TOTAL KNEE ARTHROPLASTY Left 9/8/2015    Procedure: LEFT TOTAL KNEE ARTHROPLASTY;  Surgeon: Good Suggs MD;  Location: [facility] Main OR;  Service: Orthopedics     WRIST SURGERY Right        Social History     Social History     Marital status:      Spouse name: N/A     Number of children: N/A     Years of education: N/A     Occupational History     Not on file.     Social History Main Topics     Smoking status: Former Smoker     Quit date: 11/23/2001     Smokeless tobacco: Never Used     Alcohol use No     Drug use: No     Sexual activity: Not on file     Other Topics Concern     Not on file     Social History Narrative             Objective:     Vitals:    04/26/18 0741   BP: 130/72   Pulse: (!) 57   Resp: 16   Temp: 98  F (36.7  C)   TempSrc: Oral   SpO2: 96%   Weight: (!) 242 lb (109.8 kg)   Height: 5' 1\" (1.549 m)         Physical Exam:  General appearance no acute distress    HEENT neck is negative oropharynx is clear pupils react normally extraocular movements full    Canals and TMs normal    Lungs were clear throughout O2 sat 96%    Heart was regular rate at 60.    Abdomen soft nontender no guarding rebound no axillary or inguinal and    Extremities without edema skin was normal she does have medial joint line pain on the right no effusion.    Left total knee arthroplasty incision clean.      EKG: EKG reviewed and agree with reading below    Chest x-ray: Lungs clear heart size normal    INR pending, BMP pending    Labs:  Recent Results (from the past 48 hour(s))   ECG 12 lead with MUSE    Collection Time: 04/25/18  3:43 PM   Result Value Ref Range    SYSTOLIC BLOOD PRESSURE  mmHg    DIASTOLIC BLOOD PRESSURE  mmHg    VENTRICULAR RATE 74 BPM    ATRIAL RATE 74 BPM    P-R INTERVAL 180 ms    QRS DURATION 94 ms    Q-T INTERVAL 402 ms    QTC CALCULATION (BEZET) 446 ms    P Axis 16 degrees    R AXIS 21 degrees    T AXIS 68 degrees    MUSE DIAGNOSIS       Normal sinus " rhythm  Possible Left atrial enlargement  RSR' or QR pattern in V1 suggests right ventricular conduction delay  Nonspecific ST and T wave abnormality  Abnormal ECG  When compared with ECG of 24-MAY-2017 08:54,  Minimal criteria for Anterior infarct are no longer Present     HM2(CBC w/o Differential)    Collection Time: 04/26/18  8:28 AM   Result Value Ref Range    WBC 6.0 4.0 - 11.0 thou/uL    RBC 4.13 3.80 - 5.40 mill/uL    Hemoglobin 12.5 12.0 - 16.0 g/dL    Hematocrit 38.9 35.0 - 47.0 %    MCV 94 80 - 100 fL    MCH 30.3 27.0 - 34.0 pg    MCHC 32.2 32.0 - 36.0 g/dL    RDW 13.0 11.0 - 14.5 %    Platelets 253 140 - 440 thou/uL    MPV 7.2 7.0 - 10.0 fL        Immunization History   Administered Date(s) Administered     Influenza high dose, seasonal 11/07/2015, 11/03/2016, 09/13/2017     Influenza, seasonal,quad inj 6-35 mos 10/09/2014     Pneumo Conj 13-V (2010&after) 10/09/2014     Pneumo Polysac 23-V 07/15/2015     Td, adult adsorbed, PF 04/26/2018     Td,adult,historic,unspecified 04/03/2008     Tdap 04/03/2008           Electronically signed by Jared Patel MD 04/26/18 7:44 AM

## 2021-06-17 NOTE — ANESTHESIA PROCEDURE NOTES
Peripheral Block    Patient location during procedure: pre-op  Start time: 5/8/2018 11:22 AM  End time: 5/8/2018 11:25 AM  post-op analgesia per surgeon order as noted in medical record  Staffing:  Performing  Anesthesiologist: KAYLEE VÁSQUEZ  Preanesthetic Checklist  Completed: patient identified, site marked, risks, benefits, and alternatives discussed, timeout performed, consent obtained, airway assessed, oxygen available, suction available, emergency drugs available and hand hygiene performed  Peripheral Block  Block type: other, tibial  Prep: ChloraPrep  Patient position: supine  Patient monitoring: cardiac monitor, continuous pulse oximetry, heart rate and blood pressure  Laterality: right  Injection technique: ultrasound guided    Ultrasound used to visualize needle placement in proximity to nerve being blocked: yes   Permanent ultrasound image captured for medical record  Sterile gel and probe cover used for ultrasound.    Needle  Needle type: short-bevel   Needle gauge: 20G  Needle length: 4 in  no peripheral nerve catheter placed  Assessment  Injection assessment: no difficulty with injection, negative aspiration for heme, no paresthesia on injection and incremental injection

## 2021-06-18 NOTE — PROGRESS NOTES
[facility]    Facility:   [facility] [[id number]]   Code Status: POLST AVAILABLE    Preassessment of 68-year-old female who underwent right TKA for DJD  unamenable to conservative means of management, transferred to TCU for wound care, rehabilitation, management of medical problems. Keflex 500 mg TID initiated 48 hours ago for wound infection.    Review of systems: believes she can be discharged to home setting. Denies sweats or chills. Unaware of persistent temperature elevation. Significant right knee pain remains, finds oxycodone to be beneficial in relieving pain. No cardiac or pulmonary symptoms. Tolerating physical therapy. Remainder of 12 point review of systems obtained negative.    Exam: alert and oriented times three, pleasant and cooperative, temperature 99.0, afebrile at present. Crowded oral pharynx without erythema. No carotid bruits or HJR. S1 and S2 regular. Pulmonary exam without rales rhonchi or wheezes. Abdomen without masses or tenderness. Right knee surgical site with soft-tissue swelling, trace warmth, feeding erythema. No calf tenderness or swelling. Trace peripheral edema nonpitting. Pedal pulses symmetrical and palpable.    Impression and plan:   status post right TKA, wound infection on Keflex, continues Coumadin 7 mg Q day, repeat INR in 24 hours prior to planned discharge.   Patient will be home bound and will require home PT OT and home health aide.   Coronary artery disease with previous history of bypass, no angina.   Chronic GERD on omeprazole without symptoms.   Hyperlipidemia on Crestor.   History of DVT and pulmonary emboli 2015, continue Lovenox and Coumadin.   Hypertension with adequate control of blood pressure.   Care plan and medications are reviewed, issues reviewed with patient nursing and social work. Total time of discharge evaluation greater than 35 minutes, greater than 50% of time spent in coordination of care and counseling. Follow  up will be with orthopedics and with regular physician. Discharge medications  are as follows:  Current Outpatient Prescriptions:      acetaminophen (TYLENOL) 500 MG tablet, Take 2 tablets (1,000 mg total) by mouth 3 (three) times a day., Disp: , Rfl: 0     calcium carbonate (OS-ALECIA) 600 mg (1,500 mg) tablet, Take 600 mg by mouth daily with lunch. , Disp: , Rfl:      docusate sodium (COLACE) 100 MG capsule, Take 1 capsule (100 mg total) by mouth 2 (two) times a day as needed for constipation., Disp: 20 capsule, Rfl: 1     enoxaparin (LOVENOX) 40 mg/0.4 mL syringe, Inject 0.4 mL (40 mg total) under the skin daily., Disp: 4 Syringe, Rfl: 0     metoprolol tartrate (LOPRESSOR) 25 MG tablet, Take 1 tablet (25 mg total) by mouth 2 (two) times a day., Disp: 180 tablet, Rfl: 2     multivitamin with minerals (THERA-M) 9 mg iron-400 mcg Tab tablet, Take 1 tablet by mouth daily., Disp: , Rfl:      omeprazole (PRILOSEC) 20 MG capsule, Take 1 capsule (20 mg total) by mouth every other day. (Patient taking differently: Take 20 mg by mouth every other day. Takes on odd days of month), Disp: 45 capsule, Rfl: 3     oxyCODONE (ROXICODONE) 5 MG immediate release tablet, Take 1 tablet (5 mg total) by mouth every 4 (four) hours as needed for pain., Disp: 60 tablet, Rfl: 0     polyethylene glycol (MIRALAX) 17 gram packet, Take 1 packet (17 g total) by mouth daily. Until first bowel movement., Disp: , Rfl: 0     rosuvastatin (CRESTOR) 40 MG tablet, Take 1 tablet (40 mg total) by mouth at bedtime., Disp: 90 tablet, Rfl: 3     senna-docusate (PERICOLACE) 8.6-50 mg tablet, Take 1 tablet by mouth 2 (two) times a day., Disp: , Rfl: 0     warfarin sodium (WARFARIN DAILY DOSE), Take by mouth daily with supper. 5/14/18 INR 1.3 7mg today. Next in am 5/15. Cont Lovenox 5/12/18 INR 1.2 3mg daily. 5/11/18 had 2mg 5/8/18 5mg daily., Disp: , Rfl:  with the addition of Keflex 500mg tid x 7 days.      Electronically signed by: Day Martinez MD

## 2021-06-18 NOTE — PATIENT INSTRUCTIONS - HE
Patient Instructions by Susan Maddox DO at 2/5/2020  2:00 PM     Author: Susan Maddox DO Service: -- Author Type: Physician    Filed: 2/5/2020  2:41 PM Encounter Date: 2/5/2020 Status: Addendum    : Susan Maddox DO (Physician)    Related Notes: Original Note by Susan Maddox DO (Physician) filed at 2/5/2020  2:36 PM       See attached dietary and lifestyle modifications to control reflux.  Trial pepcid 20mg twice daily.  If not controlling symptoms, we will try to appeal omeprazole denial from insurance company.  We may need to do a procedure called an EGD to look at the stomach and esophagus for source of ongoing reflux.    Patient Education     Tips to Control Acid Reflux    To control acid reflux, youll need to make some basic diet and lifestyle changes. The simple steps outlined below may be all youll need to ease discomfort.  Watch what you eat    Don't have fatty foods or spicy foods.    Eat fewer acidic foods, such as citrus and tomato-based foods. These can increase symptoms.    Limit drinking alcohol, caffeine, and fizzy beverages. All increase acid reflux.    Try limiting chocolate, peppermint, and spearmint. These can make acid reflux worse in some people.    Watch when you eat    Don't lie down for 3 hours after eating.    Don't snack before going to bed.    Raise your head  Raising your head and upper body by 4 to 6 inches helps limit reflux when youre lying down. Put blocks under the head of your bed frame or a wedge under your mattress to raise it.  Other changes    Lose weight, if you need to    Dont exercise near bedtime    Don't wear tight-fitting clothes    Limit aspirin and ibuprofen    Stop smoking    Date Last Reviewed: 7/1/2016 2000-2019 [employer]. [address], [place] [address]. All rights reserved. This information is not intended as a substitute for professional medical care. Always follow your healthcare professional's instructions.

## 2021-06-18 NOTE — PROGRESS NOTES
[facility] Patient Outreach:     Discharge Date::  5/18/18      Reason for TCU stay (discharge diagnosis)::  Right TKA, wound infection, Hx of DVT/PE      Are you feeling better, the same or worse since your discharge?:  Patient is feeling better          As part of your discharge plan, did they discuss home care with you?: Yes        Have your seen them yet, or are they scheduled to visit?: Yes                Do you have any follow up visits scheduled with your PCP or Specialist?:  Yes, with Specialist      Who are you seeing and when is it scheduled?:  5/23 with Dr. Suggs's team      I'm glad to hear you're doing well and we want you to continue to do well. Your PCP would like to see you for a follow-up visit. Can we help set that up for your today?: No (Patient has an appt with PCP(Dr. Carson) on 5/24/18.)        (RN) Provided patient the PCP's phone number to call if they have any questions or concerns?: Yes

## 2021-06-18 NOTE — PROGRESS NOTES
[facility]    Facility:   [facility] [[id number]]   Code Status: POLST AVAILABLE      Admission evaluation to TCU of 68-year-old female who under went right TKA for advanced DJD with pain unamenable to conservative means of management, no immediate postoperative complications, transferred to TCU for wound care, rehabilitation, management of medical problems which include coronary artery disease status post bypass, history of DVT post left TKA  in 2015, GERD, obstructive sleep apnea, history of pulmonary embolism.    Past medical history, current medical problem list, drug allergies, current medication list, family history, social history, code status reviewed in epic.    Review of systems: patient is seen on two occasions on this date. Significant postoperative pain, taking oxycodone. Low-grade temperature elevation since admission to TCU. Denies chest pain or palpitations. No sweats or chills. Bumped head and physical therapy this morning, attempting to get on a machine struck the edge of a shelf, bleeding frontal scalp stopped shortly after injury. Denies headache or focal neurologic deficits. Significant edema postoperative right knee. Remainder of 12 point review of systems obtained negative.    Exam: patient alert and oriented, highly pleasant, temperature 99.1, pulse 80, respiratory 16, blood pressure 146/72. No facial asymmetry. Crowded oral pharynx. No credit bruits or HJR. S1 and S2 regular. Pulmonary exam with shallow inspiratory effort, no rales rhonchi or wheezes. Abdomen without masses tenderness organomegaly. Right knee with soft-tissue swelling, initially wound is dressed and not observed directly, on second evaluation some seven hours after first evaluation erythema extending to mid thigh and approximately 8 inches from wound edges, trace warmth. Trace pedal edema nonpitting. No calf tenderness or swelling. Pedal pulses symmetrical.    Impression and plan:    status post right TKA, continued low grade temperature elevation, erythema and warmth extending from surgical site, consistent with early wound infection, begin Keflex 500 mg TID times seven days.   Hypertension on metoprolol 25 mg satisfactory control of blood pressure.   Chronic GERD on omeprazole.   Hyperlipidemia on Crestor.   History of DVT and bilateral PE, receiving Lovenox, INR 1.3, last INR 1.3 on 7 mg Coumadin, continue 7 mg Coumadin, recheck INR 48 hours, continue Lovenox.   Coronary artery disease status post CABG times three, no  anginal symptoms Total time of admission evaluation greater than 45 minutes, greater than 50% of time spent in coordination of care and counseling.    Electronically signed by: Day Martinez MD

## 2021-06-18 NOTE — PROGRESS NOTES
[facility] Clinic Note    Patient Name: Colleen Samaniego  Patient Age: 68 y.o.  YOB: 1950  MRN: [id number]    Date of visit: 5/24/2018    Patient Active Problem List   Diagnosis     Rheumatic Fever     Atypical chest pain     Hyperlipidemia     Obstructive sleep apnea     Acute Pharyngitis     Feelings Of Urinary Urgency     Hypercholesterolemia     Rheumatic fever     S/P knee replacement     Acute pulmonary embolism- moderate bilateral     Acute respiratory failure with hypoxia     Left leg DVT     Acute blood loss anemia     Coronary artery disease due to calcified coronary lesion     Unstable angina pectoris     History of pulmonary embolism     CAD (coronary artery disease)     S/P CABG x 3     History of DVT (deep vein thrombosis)     Thrombocytopenia     GERD (gastroesophageal reflux disease)     Exertional angina     Dyslipidemia     Hx of CABG     S/P total knee replacement using cement, right     Social History     Social History Narrative     Outpatient Encounter Prescriptions as of 5/24/2018   Medication Sig Dispense Refill     acetaminophen (TYLENOL) 500 MG tablet Take 2 tablets (1,000 mg total) by mouth 3 (three) times a day.  0     calcium carbonate (OS-ALECIA) 600 mg (1,500 mg) tablet Take 600 mg by mouth daily with lunch.        metoprolol tartrate (LOPRESSOR) 25 MG tablet Take 1 tablet (25 mg total) by mouth 2 (two) times a day. 180 tablet 2     multivitamin with minerals (THERA-M) 9 mg iron-400 mcg Tab tablet Take 1 tablet by mouth daily.       omeprazole (PRILOSEC) 20 MG capsule Take 1 capsule (20 mg total) by mouth every other day. (Patient taking differently: Take 20 mg by mouth every other day. Takes on odd days of month) 45 capsule 3     rosuvastatin (CRESTOR) 40 MG tablet Take 1 tablet (40 mg total) by mouth at bedtime. 90 tablet 3     senna-docusate (PERICOLACE) 8.6-50 mg tablet Take 1 tablet by mouth 2 (two) times a day.  0     warfarin sodium (WARFARIN DAILY DOSE) Take by mouth  daily with supper. 5/14/18 INR 1.3 7mg today. Next in am 5/15. Cont Lovenox  5/12/18 INR 1.2 3mg daily.  5/11/18 had 2mg  5/8/18 5mg daily.       [DISCONTINUED] enoxaparin (LOVENOX) 40 mg/0.4 mL syringe Inject 0.4 mL (40 mg total) under the skin daily. 4 Syringe 0     [DISCONTINUED] polyethylene glycol (MIRALAX) 17 gram packet Take 1 packet (17 g total) by mouth daily. Until first bowel movement.  0     No facility-administered encounter medications on file as of 5/24/2018.        Chief Complaint:   Chief Complaint   Patient presents with     Follow-up     Knee surgury       /58  Pulse 72  Temp 98  F (36.7  C) (Oral)   Resp 12  Wt (!) 236 lb 12.8 oz (107.4 kg)  BMI 44.74 kg/m2  HPI:   Surgery 5/8 had erythema, heat 5/15, started keflex for infection, finished course.  Symptoms resolved 5 days into course.  No cp/shob.  No distal numb/weakness.  Right knee becoming more mobile.  Still mild pain with movement  Saw pa yesterday for staple removal.  Pa said wound looked good.     ROS: Pertinent ros findings in hpi, all other systems negative.    Objective/Physical Exam:     /58  Pulse 72  Temp 98  F (36.7  C) (Oral)   Resp 12  Wt (!) 236 lb 12.8 oz (107.4 kg)  BMI 44.74 kg/m2    Gen: NAD, appears age  Skin: warm, dry  HENT: normocephalic atraumatic, MMM  Eyes: non-icteric, no proptosis  CV: NRRR no m/r/g, no peripheral edema  Resp: CTAB no w/r/r, normal respiratory effort  Abd: non-distended, soft  Hematologic: No petechiae or purpura    Neuro: no dysarthria or gross asymmetry  Psych: Cooperative, full affect    Right knee steri strips over well-healing incision. No purulence or erythema at incision site.  Slight pink and warm around knee with some swelling.      No knee instability with gentle exam, able to extend/flex.  Distal strength 5/5, no distal cyanosis or venous prominence. Skin warm, no pallor.  dp 2+.      Assessment/Plan:  No results found for this or any previous visit (from the past  24 hour(s)).  Encounter Diagnoses   Name Primary?     Visit for screening mammogram Yes     Gastritis      Right knee skin infection        No orders of the defined types were placed in this encounter.      Knee infection seems resolved.  RTC if any worsening.  F/u surgeon as scheduled.    Patient Instructions   If any significant increase in drainage, swelling, redness, pain, fever, weakness, numbness or other concerning symptom seek medical care immediately.        Counseled patient regarding treatments, treatment options, risks and benefits and diagnosis.  The patient was interactive, attentive, verbalized understanding, and we discussed plan.     Vargas Carson MD

## 2021-06-19 NOTE — LETTER
Letter by Kyler Lima MD (Ted) at      Author: Kyler Lima MD (Ted) Service: -- Author Type: --    Filed:  Encounter Date: 7/24/2019 Status: (Other)         Colleen Samaniego  [address]  [place] [address]      July 24, 2019      Dear Colleen,    This letter is to remind you that you will be due for your follow up appointment with Dr. Devan Lima  . To help ensure you are in the best health possible, a regular follow-up with your cardiologist is essential.     Please call our Patient Scheduling Line at [phone] to schedule your appointment at your earliest convenience.  If you have recently scheduled an appointment, please disregard this letter.    We look forward to seeing you again. As always, we are available at the number  above for any questions or concerns you may have.      Sincerely,     The Physicians and Staff of [facility]

## 2021-06-21 NOTE — PROGRESS NOTES
"Cardiology Progress Note    Assessment:  Coronary artery disease status post coronary artery bypass graft surgery in 2016, stable  Chronic  incisional chest discomfort with stable sternum  Hypercholesterolemia, on Crestor, suboptimal control in the past  Obesity  History of DVT and PE, on chronic anticoagulation  DJD right knee, status post replacement      Plan:  Check direct LDL cholesterol.  If significantly elevated may consider adding Zetia.  I encouraged her to stay physically active  Follow-up in 1 year    Subjective:   This is 68 y.o. female who comes in today for follow-up visit.  She underwent knee replacement surgery without any cardiac complication.  She denies any exertional chest pain.  She does get chest pains when she uses her upper arms for either carrying something heavy or lawnmowing.  She had a coronary angiogram in 2017 to evaluate the cause of the pain.  She turned out to have been patent bypass grafts.  She denies heart palpitations and syncope.    Review of Systems:   General: WNL  Eyes: WNL  Ears/Nose/Throat: WNL  Lungs: Shortness of Breath  Heart: Chest Pain, Shortness of Breath with activity  Stomach: WNL  Bladder: WNL  Muscle/Joints: Joint Pain, Muscle Pain  Skin: WNL  Nervous System: WNL  Mental Health: WNL     Blood: WNL    Objective:   /74 (Patient Site: Left Arm, Patient Position: Sitting, Cuff Size: Adult Large)  Pulse 72  Resp 16  Ht 5' 1.5\" (1.562 m)  Wt (!) 243 lb (110.2 kg)  BMI 45.17 kg/m2  Physical Exam:  GENERAL: no distress  NECK: No JVD  LUNGS: Clear to auscultation.  CARDIAC: regular rhythm, S1 & S2 normal.  No heaves, thrills, gallops or murmurs.  ABDOMEN: flat, negative hepatosplenomegaly, soft and non-tender.  EXTREMITIES: No evidence of cyanosis, clubbing or edema.    Current Outpatient Prescriptions   Medication Sig Dispense Refill     acetaminophen (TYLENOL) 500 MG tablet Take 2 tablets (1,000 mg total) by mouth 3 (three) times a day.  0     aspirin 81 MG " EC tablet Take 81 mg by mouth daily.       calcium carbonate (OS-ALECIA) 600 mg (1,500 mg) tablet Take 600 mg by mouth daily with lunch.        metoprolol tartrate (LOPRESSOR) 25 MG tablet Take 1 tablet (25 mg total) by mouth 2 (two) times a day. 180 tablet 2     multivitamin with minerals (THERA-M) 9 mg iron-400 mcg Tab tablet Take 1 tablet by mouth daily.       omeprazole (PRILOSEC) 20 MG capsule Take 1 capsule (20 mg total) by mouth every other day. (Patient taking differently: Take 20 mg by mouth every other day. Takes on odd days of month) 45 capsule 3     rosuvastatin (CRESTOR) 40 MG tablet Take 1 tablet (40 mg total) by mouth at bedtime. 90 tablet 3     senna-docusate (PERICOLACE) 8.6-50 mg tablet Take 1 tablet by mouth 2 (two) times a day.  0     warfarin sodium (WARFARIN DAILY DOSE) Take by mouth daily with supper. 18 INR 1.3 7mg today. Next in am 5/15. Cont Lovenox  18 INR 1.2 3mg daily.  18 had 2mg  18 5mg daily.       No current facility-administered medications for this visit.        Cardiographics:    EC2018 read by me sinus rhythm incomplete right bundle branch block no changes from 2017     Coronary angio: May 2017      LM mild dz  LAD severe dz mid with patent LIMA, small diagonal  Circ severe dz in OM with patnet SVG  RCA severe dz in mid with patent SVG  LVEDP 8mmHg  Aortic root mildly enlarged       Patient began having severe chest pain when laying flat, worse with direct pressure over sternal incision site.      Imp/plan:  1. Chest pain - musculoskeletal -related to either incisino or to statin - schedule CTnon contrast of sternum, follow up with CT surgery in clinic, hold statin for four weeks and rechalleng  2. CAD s/p CABG 3 vessel all grafts patent      Echo: 2016  Normal left ventricular size and systolic function.   Left ventricular ejection fraction is visually estimated to be 65%.   No significant valvular abnormalities.      Holter: 2017  The  predominant rhythm is sinus rhythm which is present throughout the monitoring period.  Rates range from 52 bpm to 160 bpm.  There were no significant pauses or bradycardia.      Only for atrial premature beats were present over 24 hours.    No ventricular beats were present.      Symptoms:  Symptoms of Dizziness were associated with Sinus rhythm 83 bpm.  Symptoms of shortness of breath were associated with sinus tachycardia rate 104 and 128 bpm.  Symptoms of chest pressure was associated with sinus rhythm rate 91 bpm, all without ectopy    Lab Results:       Lab Results   Component Value Date    CHOL 184 04/02/2018    CHOL 217 (H) 10/09/2017    CHOL 155 05/24/2017     Lab Results   Component Value Date    HDL 45 (L) 04/02/2018    HDL 42 (L) 10/09/2017    HDL 36 (L) 05/24/2017     Lab Results   Component Value Date    LDLCALC 104 04/02/2018    LDLCALC 135 (H) 10/09/2017    LDLCALC 93 05/24/2017     Lab Results   Component Value Date    TRIG 177 (H) 04/02/2018    TRIG 202 (H) 10/09/2017    TRIG 131 05/24/2017     BNP   Date Value Ref Range Status   05/02/2017 42 0 - 114 pg/mL Final       Kyler (Devan)  MD Janie

## 2021-06-25 NOTE — PROGRESS NOTES
Progress Notes by Kyler Lima MD (Ted) at 5/11/2017  3:20 PM     Author: Kyler Lima MD (Ted) Service: -- Author Type: Physician    Filed: 5/11/2017  3:40 PM Encounter Date: 5/11/2017 Status: Signed    : Kyler Lima MD (Ted) (Physician)           Click to link to [facility] NOTE    Thank you, Dr. Patel, for asking the [facility] to evaluate Ms. Colleen Samaniego.      Assessment/Recommendations   Assessment:    Recurrent chest pain and exertional shortness of breath rule out early failure of bypass grafts  Coronary artery disease status post coronary artery bypass graft surgery in 2016  Obesity  History of DVT and PE    Plan:  She continues to experience exertional chest symptoms.  She had negative stress test in December of last year.  Objectively we do not see evidence of ischemia but her symptoms are worrisome.  I think it would be reasonable to rule out early graft failure.  Coronary angiogram was discussed with the patient.  She agrees to proceed.       History of Present Illness    Ms. Colleen Samaniego is a 67 y.o. female who comes in today for follow-up visit.  She continues to get short of breath and and chest pain when she exerts herself.  She denies weight gain, PND, orthopnea.  She has not had heart palpitations or syncope.  She was recently admitted to the hospital for observation.  She will rule out for myocardial infarction.  She had a CT of the chest to rule out PE.  She had no evidence of recurrent pulmonary embolization.  There was no evidence of sternal nonunion.    ECG: Personally reviewed.  Normal sinus rhythm with incomplete right bundle branch block.        Stress Test: December 2016 negative perfusion scan         Physical Examination Review of Systems   Vitals:    05/11/17 1508   BP: 124/68   Pulse: 72   Resp: 20     Body mass index is 43.15 kg/(m^2).  Wt Readings from Last 3 Encounters:   05/11/17  (!) 234 lb (106.1 kg)   05/10/17 (!) 234 lb (106.1 kg)   05/03/17 (!) 231 lb (104.8 kg)     General Appearance:   Alert, cooperative, no distress, appears stated age   Head/ENT: Normocephalic, without obvious abnormality. Membranes moist      EYES:  no scleral icterus, normal conjunctivae   Neck: Supple, symmetrical, trachea midline, no adenopathy, thyroid: not enlarged, symmetric, no carotid bruit or JVD   Chest/Lungs:   Lungs are clear to auscultation, respirations unlabored. No tenderness or deformity    Cardiovascular:   Regular rhythm, S1, S2 normal, no murmur, rub or gallop.  Sternum appears stable currently.  No tenderness to palpation, no clicking    Abdomen:  Soft, non-tender, bowel sounds active all four quadrants,  no masses, no organomegaly   Extremities: no cyanosis or clubbing. No edema   Skin: Skin color, texture, turgor normal, no rashes or lesions.    Psychiatric: Normal affect, calm   Neurologic: Alert and oriented x 3, moving all four extremities.     General: Weight Gain  Eyes: WNL  Ears/Nose/Throat: Hearing Loss  Lungs: Shortness of Breath, Snoring  Heart: Chest Pain, Shortness of Breath with activity  Stomach: WNL  Bladder: WNL  Muscle/Joints: Joint Pain, Muscle Weakness, Muscle Pain  Skin: WNL  Nervous System: Daytime Sleepiness, Dizziness  Mental Health: Depression, Anxiety     Blood: Easy Bruising     Medical History  Surgical History Family History Social History   Past Medical History:   Diagnosis Date   ? Arthritis    ? Coronary artery disease    ? DVT (deep venous thrombosis)    ? Family history of myocardial infarction    ? GERD (gastroesophageal reflux disease)    ? High cholesterol    ? History of anesthesia complications    ? Hypercholesterolemia    ? Left leg DVT 9/2015    Postop Left TKA   ? Pulmonary embolism, bilateral 9/2015    Postop Left TKA   ? Rheumatic fever     1953   ? Sleep apnea, obstructive     uses CPAP    Past Surgical History:   Procedure Laterality Date   ?  APPENDECTOMY     ? BREAST CYST ASPIRATION     ?  SECTION       x2   ? CORONARY ARTERY BYPASS GRAFT N/A 2016    Procedure: CORONARY ARTERY BYPASS GRAFT X 3, ENDOSCOPIC VEIN HARVEST, INTERNAL MAMMARY ARTERY, ANESTHESIA TRANSESOPHAGEAL ECHOCARDIOGRAM ;  Surgeon: Rancho Rodas MD;  Location: [facility];  Service:    ? HYSTERECTOMY     ? JOINT REPLACEMENT      left knee   ? OOPHORECTOMY     ? HI TOTAL ABDOM HYSTERECTOMY      Description: Total Abdominal Hysterectomy;  Recorded: 2010;   ? HI TOTAL KNEE ARTHROPLASTY Left 2015    Procedure: LEFT TOTAL KNEE ARTHROPLASTY;  Surgeon: Good Suggs MD;  Location: [facility] OR;  Service: Orthopedics    Family History   Problem Relation Age of Onset   ? Hypertension Mother    ? Heart disease Mother    ? Heart disease Father    ? Stroke Sister    ? Heart disease Sister    ? Clotting disorder Sister    ? Factor V Leiden deficiency Sister    ? Clotting disorder Brother    ? Factor V Leiden deficiency Brother    ? Anesthesia problems Neg Hx    ? Breast cancer Neg Hx     Social History     Social History   ? Marital status:      Spouse name: N/A   ? Number of children: N/A   ? Years of education: N/A     Occupational History   ? Not on file.     Social History Main Topics   ? Smoking status: Former Smoker   ? Smokeless tobacco: Never Used   ? Alcohol use No   ? Drug use: No   ? Sexual activity: Not on file     Other Topics Concern   ? Not on file     Social History Narrative          Medications  Allergies   Current Outpatient Prescriptions   Medication Sig Dispense Refill   ? acetaminophen (TYLENOL) 500 MG tablet Take 1,000 mg by mouth every 6 (six) hours as needed for pain.     ? aspirin 81 mg chewable tablet Chew 81 mg daily.     ? calcium carbonate (OS-ALECIA) 600 mg (1,500 mg) tablet Take 600 mg by mouth daily with lunch.      ? metoprolol tartrate (LOPRESSOR) 25 MG tablet Take 1 tablet (25 mg total) by mouth 2 (two) times a day.  60 tablet 11   ? multivitamin with minerals (THERA-M) 9 mg iron-400 mcg Tab tablet Take 1 tablet by mouth daily.     ? omeprazole (PRILOSEC) 20 MG capsule Take 1 capsule (20 mg total) by mouth every other day. 45 capsule 2   ? rosuvastatin (CRESTOR) 40 MG tablet Take 40 mg by mouth bedtime.  4     No current facility-administered medications for this visit.       Allergies   Allergen Reactions   ? Aleve [Naproxen Sodium]      Causes acid reflux   ? Amoxicillin Hives   ? Atorvastatin      Annotation: myalgia     ? Ibuprofen Swelling     Face swelled and became red/ blotchy.  Can take aspirin without reaction   ? Simvastatin      Didn't work for her.  Did not lower cholesterol much         Lab Results    Chemistry/lipid CBC Cardiac Enzymes/BNP/TSH/INR   Lab Results   Component Value Date    CHOL 164 12/01/2016    HDL 34 (L) 12/01/2016    LDLCALC 99 12/01/2016    TRIG 156 (H) 12/01/2016    CREATININE 0.99 05/02/2017    BUN 21 05/02/2017    K 3.8 05/02/2017     05/02/2017     05/02/2017    CO2 26 05/02/2017    Lab Results   Component Value Date    WBC 6.7 05/02/2017    HGB 11.6 (L) 05/02/2017    HCT 34.8 (L) 05/02/2017    MCV 92 05/02/2017     05/02/2017    Lab Results   Component Value Date    CKTOTAL 77 09/10/2012    TROPONINI <0.01 05/03/2017    BNP 42 05/02/2017    TSH 3.1 01/05/2010    INR 1.22 (H) 04/30/2016

## 2021-06-27 ENCOUNTER — HEALTH MAINTENANCE LETTER (OUTPATIENT)
Age: 71
End: 2021-06-27

## 2021-06-29 NOTE — PROGRESS NOTES
"Progress Notes by Kyler Lima MD (Ted) at 10/7/2020 12:50 PM     Author: Kyler Lima MD (Ted) Service: -- Author Type: Physician    Filed: 10/7/2020  1:18 PM Encounter Date: 10/7/2020 Status: Signed    : Kyler Lima MD (Ted) (Physician)           Cardiology Progress Note    Assessment:  Coronary artery disease status post coronary artery bypass graft surgery in 2016, stable  Chronic  incisional chest discomfort with stable sternum/normal CT, proved  Chronic exertional dyspnea, multifactorial, suspect combination of deconditioning and obesity playing a major role, no evidence of heart failure, diaphragmatic paralysis or intrinsic lung disease  Hypercholesterolemia, on Crestor, good control  Obesity  History of DVT and PE, on chronic anticoagulation  DJD right knee, status post replacement      Plan:  Echo to reassess pulmonary pressures  Continue current medications  I encouraged her to be physically active    Routine follow-up in 1 year    Subjective:   This is 70 y.o. female who comes in today follow-up visit.  She reports no exertional chest pain.  She continues to feel mildly short of breath with exertion.  She has no PND and orthopnea.  She denies syncope or heart palpitations.  Last year she had PFTs and sniff test because of persistent shortness of breath.  There was no significant abnormalities.  BNP was also normal.      Review of Systems:   General: WNL  Eyes: WNL  Ears/Nose/Throat: WNL  Lungs: Shortness of Breath  Heart: WNL  Stomach: WNL  Bladder: WNL  Muscle/Joints: WNL  Skin: WNL  Nervous System: Daytime Sleepiness  Mental Health: WNL     Blood: Easy Bruising    Objective:   /68 (Patient Site: Left Arm, Patient Position: Sitting, Cuff Size: Adult Large)   Pulse 65   Resp 12   Ht 5' 1.25\" (1.556 m)   Wt (!) 246 lb (111.6 kg)   BMI 46.10 kg/m    Physical Exam:  GENERAL: no distress  NECK: No JVD  LUNGS: Clear to auscultation.  CARDIAC: " regular rhythm, S1 & S2 normal.  No heaves, thrills, gallops or murmurs.  ABDOMEN: flat, negative hepatosplenomegaly, soft and non-tender.  EXTREMITIES: No evidence of cyanosis, clubbing or edema.    Current Outpatient Medications   Medication Sig Dispense Refill   ? acetaminophen (TYLENOL) 500 MG tablet Take 2 tablets (1,000 mg total) by mouth 3 (three) times a day. (Patient taking differently: Take 1,000 mg by mouth as needed.       )  0   ? alendronate (FOSAMAX) 70 MG tablet 1 TABLET ONCE EVERY 7 DAYS IN THE A.M. ON EMPTY STOMACH WITH FULL GLASS WATER 30 MINUTES BEFORE FOOD 12 tablet 3   ? aspirin 81 MG EC tablet Take 81 mg by mouth daily.     ? calcium carbonate (OS-ALECIA) 600 mg (1,500 mg) tablet Take 600 mg by mouth daily with lunch.      ? cholecalciferol, vitamin D3, 1,000 unit (25 mcg) tablet Take 1,000 Units by mouth 2 (two) times a day.     ? ezetimibe (ZETIA) 10 mg tablet Take 1 tablet (10 mg total) by mouth daily. 90 tablet 0   ? famotidine (PEPCID) 40 MG tablet Take 1 tablet (40 mg total) by mouth daily. 90 tablet 3   ? metoprolol tartrate (LOPRESSOR) 25 MG tablet TAKE 1 TABLET BY MOUTH TWICE A  tablet 1   ? multivitamin with minerals (THERA-M) 9 mg iron-400 mcg Tab tablet Take 1 tablet by mouth daily.     ? rosuvastatin (CRESTOR) 40 MG tablet Take 1 tablet (40 mg total) by mouth at bedtime. 90 tablet 0     No current facility-administered medications for this visit.        Cardiographics:    EC2018 read by me sinus rhythm incomplete right bundle branch block no changes from 2017     Coronary angio: May 2017      LM mild dz  LAD severe dz mid with patent LIMA, small diagonal  Circ severe dz in OM with patnet SVG  RCA severe dz in mid with patent SVG  LVEDP 8mmHg  Aortic root mildly enlarged       Patient began having severe chest pain when laying flat, worse with direct pressure over sternal incision site.      Imp/plan:  1. Chest pain - musculoskeletal -related to either incisino or to  statin - schedule CTnon contrast of sternum, follow up with CT surgery in clinic, hold statin for four weeks and rechalleng  2. CAD s/p CABG 3 vessel all grafts patent      Echo: April 2016  Normal left ventricular size and systolic function.   Left ventricular ejection fraction is visually estimated to be 65%.   No significant valvular abnormalities.      Holter: July 2017  The predominant rhythm is sinus rhythm which is present throughout the monitoring period.  Rates range from 52 bpm to 160 bpm.  There were no significant pauses or bradycardia.      Only for atrial premature beats were present over 24 hours.    No ventricular beats were present.      Symptoms:  Symptoms of Dizziness were associated with Sinus rhythm 83 bpm.  Symptoms of shortness of breath were associated with sinus tachycardia rate 104 and 128 bpm.  Symptoms of chest pressure was associated with sinus rhythm rate 91 bpm, all without ectopy    Lab Results:       Lab Results   Component Value Date    CHOL 150 08/18/2020    CHOL 154 05/01/2019    CHOL 184 04/02/2018     Lab Results   Component Value Date    HDL 38 (L) 08/18/2020    HDL 43 (L) 05/01/2019    HDL 45 (L) 04/02/2018     Lab Results   Component Value Date    LDLCALC 70 08/18/2020    LDLCALC 77 05/01/2019    LDLCALC 104 04/02/2018     Lab Results   Component Value Date    TRIG 212 (H) 08/18/2020    TRIG 171 (H) 05/01/2019    TRIG 177 (H) 04/02/2018     BNP   Date Value Ref Range Status   10/07/2019 30 0 - 120 pg/mL Final       Kyler (Devan)  MD Janie

## 2021-07-03 NOTE — ADDENDUM NOTE
Addendum Note by Kehinde Maddox DO at 5/21/2019  8:40 AM     Author: Kehinde Maddox DO Service: -- Author Type: Physician    Filed: 5/21/2019  9:47 AM Encounter Date: 5/21/2019 Status: Signed    : Kehinde Maddox DO (Physician)    Addended by: KEHINDE MADDOX on: 5/21/2019 09:47 AM        Modules accepted: Level of Service

## 2021-07-03 NOTE — ADDENDUM NOTE
Addendum Note by Mylene Falcon CMA at 4/16/2019 10:32 AM     Author: Mylene Falcon CMA Service: -- Author Type: Medical Assistant    Filed: 4/16/2019 10:32 AM Encounter Date: 4/13/2019 Status: Signed    : Mylene Falcon CMA (Medical Assistant)    Addended by: MYLENE FALCON on: 4/16/2019 10:32 AM        Modules accepted: Orders

## 2021-07-03 NOTE — ADDENDUM NOTE
Addendum Note by Kehinde Maddox DO at 5/21/2019  8:40 AM     Author: Kehinde Maddox DO Service: -- Author Type: Physician    Filed: 5/29/2019  9:45 AM Encounter Date: 5/21/2019 Status: Signed    : Kehinde Maddox DO (Physician)    Addended by: KEHINDE MADDOX on: 5/29/2019 09:45 AM        Modules accepted: Orders

## 2021-07-03 NOTE — ADDENDUM NOTE
Addendum Note by Kehinde Maddox DO at 5/1/2019  7:20 AM     Author: Kehinde Maddox DO Service: -- Author Type: Physician    Filed: 5/1/2019 12:39 PM Encounter Date: 5/1/2019 Status: Signed    : Kehinde Maddox DO (Physician)    Addended by: KEHINDE MADDOX on: 5/1/2019 12:39 PM        Modules accepted: Orders

## 2021-07-05 ENCOUNTER — RECORDS - HEALTHEAST (OUTPATIENT)
Dept: FAMILY MEDICINE | Facility: CLINIC | Age: 71
End: 2021-07-05

## 2021-07-05 DIAGNOSIS — M81.0 AGE-RELATED OSTEOPOROSIS WITHOUT CURRENT PATHOLOGICAL FRACTURE: ICD-10-CM

## 2021-07-05 RX ORDER — ALENDRONATE SODIUM 70 MG/1
TABLET ORAL
Qty: 12 TABLET | Refills: 0 | Status: SHIPPED | OUTPATIENT
Start: 2021-07-05 | End: 2021-09-08

## 2021-07-05 NOTE — TELEPHONE ENCOUNTER
Telephone Encounter by Susan Maddox DO at 7/5/2021 10:58 AM     Author: Susan Maddox DO Service: -- Author Type: Physician    Filed: 7/5/2021 10:59 AM Encounter Date: 7/4/2021 Status: Signed    : Susan Maddox DO (Physician)       Due for follow up dexa scan. Ordered. 3 month refill to bridge through dexa scan.  Please also schedule medicare wellness visit after dexa scan (so we can review results) and after August 18th when due for medicare wellness exam.    Thank you!    Susan Maddox DO

## 2021-07-05 NOTE — TELEPHONE ENCOUNTER
Telephone Encounter by Tl Callaway RN at 2021  9:23 AM     Author: Tl Callaway RN Service: -- Author Type: Registered Nurse    Filed: 2021  9:23 AM Encounter Date: 2021 Status: Signed    : Tl Callaway, RN (Registered Nurse)       RN cannot approve Refill Request    RN can NOT refill this medication   Patient request early refill. Medication last filled 20 for qty 12 refill 3. Provider to advise on request. . Last office visit: 3/5/2020 Susan Maddox DO Last Physical: 2020 Last MTM visit: Visit date not found Last visit same specialty: 3/5/2020 Susan Maddox DO.  Next visit within 3 mo: Visit date not found  Next physical within 3 mo: Visit date not found      Tl Callaway, [facility] Triage/Med Refill 2021    Requested Prescriptions   Pending Prescriptions Disp Refills   ? alendronate (FOSAMAX) 70 MG tablet [Pharmacy Med Name: ALENDRONATE SODIUM 70 MG TAB] 12 tablet 3     Si TABLET ONCE EVERY 7 DAYS IN THE A.M. ON EMPTY STOMACH WITH FULL GLASS WATER 30 MINUTES BEFORE FOOD       Biphosphonates Refill Protocol Passed - 2021  7:51 PM        Passed - PCP or prescribing provider visit in last 12 months     Last office visit with prescriber/PCP: 3/5/2020 Susan Maddox DO OR same dept: Visit date not found OR same specialty: 3/5/2020 Susan Maddox DO  Last physical: 2020 Last MTM visit: Visit date not found   Next visit within 3 mo: Visit date not found  Next physical within 3 mo: Visit date not found  Prescriber OR PCP: Susan Maddox DO  Last diagnosis associated with med order: 1. Age-related osteoporosis without current pathological fracture  - alendronate (FOSAMAX) 70 MG tablet [Pharmacy Med Name: ALENDRONATE SODIUM 70 MG TAB]; 1 TABLET ONCE EVERY 7 DAYS IN THE A.M. ON EMPTY STOMACH WITH FULL GLASS WATER 30 MINUTES BEFORE FOOD  Dispense: 12 tablet; Refill: 3    If protocol passes may refill for 12 months if within 3 months of last provider visit (or a  total of 15 months).             Passed - Serum creatinine in last 12 months     Creatinine   Date Value Ref Range Status   08/18/2020 0.97 0.60 - 1.10 mg/dL Final

## 2021-07-05 NOTE — TELEPHONE ENCOUNTER
Telephone Encounter by Kelsey Ulloa MA at 7/5/2021 11:05 AM     Author: Kelsey Ulloa MA Service: -- Author Type: Certified Medical Assistant    Filed: 7/5/2021 11:06 AM Encounter Date: 7/4/2021 Status: Signed    : Kelsey Ulloa MA (Certified Medical Assistant)       Please schedule AWV after 8/18 and give her number to schedule DEXA [phone]

## 2021-08-02 ENCOUNTER — TRANSFERRED RECORDS (OUTPATIENT)
Dept: HEALTH INFORMATION MANAGEMENT | Facility: CLINIC | Age: 71
End: 2021-08-02

## 2021-08-06 NOTE — PATIENT INSTRUCTIONS - HE
Patient Instructions by Susan Maddox DO at 5/1/2019  7:20 AM     Author: Susan Maddox DO Service: -- Author Type: Physician    Filed: 5/1/2019  7:30 AM Encounter Date: 5/1/2019 Status: Signed    : Susan Maddox DO (Physician)         Patient Education     Exercise for a Healthier Heart  You may wonder how you can improve the health of your heart. If youre thinking about exercise, youre on the right track. You dont need to become an athlete, but you do need a certain amount of brisk exercise to help strengthen your heart. If you have been diagnosed with a heart condition, your doctor may recommend exercise to help stabilize your condition. To help make exercise a habit, choose safe, fun activities.       Be sure to check with your health care provider before starting an exercise program.    Why exercise?  Exercising regularly offers many healthy rewards. It can help you do all of the following:    Improve your blood cholesterol levels to help prevent further heart trouble    Lower your blood pressure to help prevent a stroke or heart attack    Control diabetes, or reduce your risk of getting this disease    Improve your heart and lung function    Reach and maintain a healthy weight    Make your muscles stronger and more limber so you can stay active    Prevent falls and fractures by slowing the loss of bone mass (osteoporosis)    Manage stress better  Exercise tips  Ease into your routine. Set small goals. Then build on them.  Exercise on most days. Aim for a total of 150 or more minutes of moderate to  vigorous intensity activity each week. Consider 40 minutes, 3 to 4 times a week. For best results, activity should last for 40 minutes on average. It is OK to work up to the 40 minute period over time. Examples of moderate-intensity activity is walking one mile in 15 minutes or 30 to 45 minutes of yard work.  Step up your daily activity level. Along with your exercise program, try being more active  throughout the day. Walk instead of drive. Do more household tasks or yard work.  Choose one or more activities you enjoy. Walking is one of the easiest things you can do. You can also try swimming, riding a bike, or taking an exercise class.  Stop exercising and call your doctor if you:    Have chest pain or feel dizzy or lightheaded    Feel burning, tightness, pressure, or heaviness in your chest, neck, shoulders, back, or arms    Have unusual shortness of breath    Have increased joint or muscle pain    Have palpitations or an irregular heartbeat      2099-1616 [employer]. [address] [place] [address]. All rights reserved. This information is not intended as a substitute for professional medical care. Always follow your healthcare professional's instructions.         Patient Education   Understanding [employer] MyPlate  The USDA (US Department of Agriculture) has guidelines to help you make healthy food choices. These are called MyPlate. MyPlate shows the food groups that make up healthy meals using the image of a place setting. Before you eat, think about the healthiest choices for what to put onto your plate or into your cup or bowl. To learn more about building a healthy plate, visit www.choosemyplate.gov.       The Food Groups    Fruits: Any fruit or 100% fruit juice counts as part of the Fruit Group. Fruits may be fresh, canned, frozen, or dried, and may be whole, cut-up, or pureed. Make half your plate fruits and vegetables.    Vegetables: Any vegetable or 100% vegetable juice counts as a member of the Vegetable Group. Vegetables may be fresh, frozen, canned, or dried. They can be served raw or cooked and may be whole, cut-up, or mashed. Make half your plate fruits and vegetables.     Grains: All foods made from grains are part of the Grains Group. These include wheat, rice, oats, cornmeal, and barley such as bread, pasta, oatmeal, cereal, tortillas, and grits. Grains should be no more  than a quarter of your plate. At least half of your grains should be whole grains.    Protein: This group includes meat, poultry, seafood, beans and peas, eggs, processed soy products (like tofu), nuts (including nut butters), and seeds. Make protein choices no more than a quarter of your plate. Meat and poultry choices should be lean or low fat.    Dairy: All fluid milk products and foods made from milk that contain calcium, like yogurt and cheese are part of the Dairy Group. (Foods that have little calcium, such as cream, butter, and cream cheese, are not part of the group.) Most dairy choices should be low-fat or fat-free.    Oils: These are fats that are liquid at room temperature. They include canola, corn, olive, soybean, and sunflower oil. Foods that are mainly oil include mayonnaise, certain salad dressings, and soft margarines. You should have only 5 to 7 teaspoons of oils a day. You probably already get this much from the food you eat.  Use Ideabove to Help Build Your Meals  The [employer] can help you plan and track your meals and activity. You can look up individual foods to see or compare their nutritional value. You can get guidelines for what and how much you should eat. You can compare your food choices. And you can assess personal physical activities and see ways you can improve. Go to www.[employer].gov/supertracker/.    8657-0924 [employer]. [address] [place] [address]. All rights reserved. This information is not intended as a substitute for professional medical care. Always follow your healthcare professional's instructions.           Patient Education   Signs of Hearing Loss  Hearing loss is a problem shared by many people. In fact, it is one of the most common health conditions, particularly as people age. Most people over age 65 have some hearing loss, and by age 80, almost everyone does. Because hearing loss usually occurs slowly over the years, you may  not realize your hearing ability has gotten worse.       Have your hearing checked  Contact your [facility] care provider if you:    Have to strain to hear normal conversation.    Have to watch other peoples faces very carefully to follow what theyre saying.    Need to ask people to repeat what theyve said.    Often misunderstand what people are saying.    Turn the volume of the television or radio up so high that others complain.    Feel that people are mumbling when theyre talking to you.    Find that the effort to hear leaves you feeling tired and irritated.    Notice, when using the phone, that you hear better with 1 ear than the other.    6502-9031 [employer]. [address] [place] [address], [place] [address]. All rights reserved. This information is not intended as a substitute for professional medical care. Always follow your healthcare professional's instructions.         Patient Education   Urinary Incontinence, Female (Adult)  Urinary incontinence means loss of control of the bladder. This problem affects many women, especially as they get older. If you have incontinence, you may be embarrassed to ask for help. But know that this problem can be treated.  Types of Incontinence  There are different types of incontinence. Two of the main types are described here. You can have more than one type.    Stress incontinence. With this type, urine leaks when pressure (stress) is put on the bladder. This may happen when you cough, sneeze, or laugh. Stress incontinence most often occurs because the pelvic floor muscles that support the bladder and urethra are weak. This can happen after pregnancy and vaginal childbirth or a hysterectomy. It can also be due to excess body weight or hormone changes.    Urge incontinence (also called overactive bladder). With this type, a sudden urge to urinate is felt often. This may happen even though there may not be much urine in the bladder. The need to urinate often during the  night is common. Urge incontinence most often occurs because of bladder spasms. This may be due to bladder irritation or infection. Damage to bladder nerves or pelvic muscles, constipation, and certain medicines can also lead to urge incontinence.  Treatment of urinary incontinence depends on the cause. Further evaluation is needed to find the type you have. This will likely include an exam and certain tests. Based on the results, you and your healthcare provider can then plan treatment. Until a diagnosis is made, the home care tips below can help relieve symptoms.  Home care    Do pelvic floor muscle exercises, if they are prescribed. The pelvic floor muscles help support the bladder and urethra. Many women find that their symptoms improve when doing special exercises that strengthen these muscles. To do the exercises contract the muscles you would use to stop your stream of urine, but do this when youre not urinating. Hold for 10 seconds, then relax. Repeat 10 to 20 times in a row, at least 3 times a day. Your provider may give you other instructions for how to do the exercises and how often.    Keep a bladder diary. This helps track how often and how much you urinate over a set period of time. Bring this diary with you to your next visit with the provider. The information can help your provider learn more about your bladder problem.    Lose weight, if advised to by your provider. Excess weight puts pressure on the bladder. Your provider can help you create a weight-loss plan thats right for you. This may include exercising more and making certain diet changes.    Don't consume foods and drinks that may irritate the bladder. These can include alcohol and caffeinated drinks.    Quit smoking. Smoking and other tobacco use can lead to chronic cough that strains the pelvic floor muscles. Smoking may also damage the bladder and urethra. Talk with your provider about treatments or methods you can use to quit  smoking.    If drinking large amounts of fluid causes you to have symptoms, you may be advised to limit your fluid intake. You may also be advised to drink most of your fluids during the day and to limit fluids at night.    If youre worried about urine leakage or accidents, you may wear absorbent pads to catch urine. Change the pads often. This helps reduce discomfort. It may also reduce the risk of skin or bladder infections.  Follow-up care  Follow up with your healthcare provider, or as directed. It may take some to find the right treatment for your problem. Your treatment plan may include special therapies or medicines. Certain procedures or surgery may also be options. Be sure to discuss any questions you have with your provider.  When to seek medical advice  Call the healthcare provider right away if any of these occur:    Fever of 100.4 F (38 C) or higher, or as directed by your provider    Bladder pain or fullness    Abdominal swelling    Nausea or vomiting    Back pain    Weakness, dizziness or fainting  Date Last Reviewed: 10/1/2017    7608-7259 [employer]. [address] [place] [address]. All rights reserved. This information is not intended as a substitute for professional medical care. Always follow your healthcare professional's instructions.     Patient Education   Your Health Risk Assessment indicates you feel you are not in good emotional health.    Recreation   Recreation is not limited to sports and team events. It includes any activity that provides relaxation, interest, enjoyment, and exercise. Recreation provides an outlet for physical, mental, and social energy. It can give a sense of worth and achievement. It can help you stay healthy.    Mental Exercise and Social Involvement  Mental and emotional health is as important as physical health. Keep in touch with friends and family. Stay as active as possible. Continue to learn and challenge yourself.   Things you can do  to stay mentally active are:    Learn something new, like a foreign language or musical instrument.     Play SCRABBLE or do crossword puzzles. If you cannot find people to play these games with you at home, you can play them with others on your computer through the Internet.     Join a games club--anything from card games to chess or checkers or lawn bowling.     Start a new hobby.     Go back to school.     Volunteer.     Read.     Keep up with world events.       Patient Education   Preventing Falls in the Home  As you get older, falls are more likely. Thats because your reaction time slows. Your muscles and joints may also get stiffer, making them less flexible. Illness, medications, and vision changes can also affect your balance. A fall could leave you unable to live on your own. To make your home safer, follow these tips:    Floors    Put nonskid pads under area rugs.    Remove throw rugs.    Replace worn floor coverings.    Tack carpets firmly to each step on carpeted stairs. Put nonskid strips on the edges of uncarpeted stairs.    Keep floors and stairs free of clutter and cords.    Arrange furniture so there are clear pathways.    Clean up any spills right away.    Bathrooms    Install grab bars in the tub or shower.    Apply nonskid strips or put a nonskid rubber mat in the tub or shower.    Sit on a bath chair to bathe.    Use bathmats with nonskid backing.    Lighting    Keep a flashlight in each room.    Put a nightlight along the pathway between the bedroom and the bathroom.    8218-5152 [employer]. [address] [place] [address], [place] [address]. All rights reserved. This information is not intended as a substitute for professional medical care. Always follow your healthcare professional's instructions.         Patient Education   Understanding Advance Care Planning  Advance care planning is the process of deciding ones own future medical care. It helps ensure that if you cant speak for  yourself, your wishes can still be carried out. The plan is a series of legal documents that note a persons wishes. The documents vary by state. Advance care planning may be done when a person has a serious illness that is expected to get worse. It may be done before major surgery. And it can help you and your family be prepared in case of a major illness or injury. Advance care planning helps with making decisions at these times.       A health care proxy is a person who acts as the voice of a patient when the patient cant speak for himself or herself. The name of this role varies by state. It may be called a Durable Medical Power of  or Durable Power of  for Healthcare. It may be called an agent, surrogate, or advocate. Or it may be called a representative or decision maker. It is an official duty that is identified by a legal document. The document also varies by state.    Why Is Advance Care Planning Important?  If a person communicates their healthcare wishes:    They will be given medical care that matches their values and goals.    Their family members will not be forced to make decisions in a crisis with no guidance.  Creating a Plan  Making an advance care plan is often done in 3 steps:    Thinking about ones wishes. To create an advance care plan, you should think about what kind of medical treatment you would want if you lose the ability to communicate. Are there any situations in which you would refuse or stop treatment? Are there therapies you would want or not want? And whom do you want to make decisions for you? There are many places to learn more about how to plan for your care. Ask your doctor or  for resources.    Picking a health care proxy. This means choosing a trusted person to speak for you only when you cant speak for yourself. When you cannot make medical decisions, your proxy makes sure the instructions in your advance care plan are followed. A proxy does not  make decisions based on his or her own opinions. They must put aside those opinions and values if needed, and carry out your wishes.    Filling out the legal documents. There are several kinds of legal documents for advance care planning. Each one tells health care providers your wishes. The documents may vary by state. They must be signed and may need to be witnessed or notarized. You can cancel or change them whenever you wish. Depending on your state, the documents may include a Healthcare Proxy form, Living Will, Durable Medical Power of , Advance Directive, or others.  The Familys Role  The best help a family can give is to support their loved ones wishes. Open and honest communication is vital. Family should express any concerns they have about the patients choices while the patient can still make decisions.    5607-9914 [employer]. [address] [place] [address]. All rights reserved. This information is not intended as a substitute for professional medical care. Always follow your healthcare professional's instructions.         Also, [facility] offers a free, downloadable health care directive that allows you to share your treatment choices and personal preferences if you cannot communicate your wishes. It also allows you to appoint another person (called a health care agent) to make health care decisions if you are unable to do so. You can download an advance directive by going here: [email or web address]     Patient Education   Personalized Prevention Plan  You are due for the preventive services outlined below.  Your care team is available to assist you in scheduling these services.  If you have already completed any of these items, please share that information with your care team to update in your medical record.  Health Maintenance   Topic Date Due   ? DXA SCAN  01/05/2014   ? ZOSTER VACCINES (2 of 2) 09/09/2015   ? FALL RISK  ASSESSMENT  04/17/2020   ? ADVANCE DIRECTIVES DISCUSSED WITH PATIENT  07/15/2020   ? MAMMOGRAM  10/12/2020   ? TD 18+ HE  04/26/2028   ? COLONOSCOPY  12/10/2028   ? PNEUMOCOCCAL POLYSACCHARIDE VACCINE AGE 65 AND OVER  Completed   ? INFLUENZA VACCINE RULE BASED  Completed   ? PNEUMOCOCCAL CONJUGATE VACCINE FOR ADULTS (PCV13 OR PREVNAR)  Completed

## 2021-09-08 ENCOUNTER — OFFICE VISIT (OUTPATIENT)
Dept: FAMILY MEDICINE | Facility: CLINIC | Age: 71
End: 2021-09-08
Payer: COMMERCIAL

## 2021-09-08 VITALS
BODY MASS INDEX: 45.76 KG/M2 | DIASTOLIC BLOOD PRESSURE: 64 MMHG | SYSTOLIC BLOOD PRESSURE: 129 MMHG | WEIGHT: 242.38 LBS | HEIGHT: 61 IN | HEART RATE: 57 BPM

## 2021-09-08 DIAGNOSIS — Z79.899 MEDICATION MANAGEMENT: ICD-10-CM

## 2021-09-08 DIAGNOSIS — E66.01 MORBID OBESITY (H): ICD-10-CM

## 2021-09-08 DIAGNOSIS — Z00.00 ENCOUNTER FOR ANNUAL WELLNESS EXAM IN MEDICARE PATIENT: Primary | ICD-10-CM

## 2021-09-08 DIAGNOSIS — M81.0 AGE-RELATED OSTEOPOROSIS WITHOUT CURRENT PATHOLOGICAL FRACTURE: ICD-10-CM

## 2021-09-08 DIAGNOSIS — I20.89 EXERTIONAL ANGINA (H): ICD-10-CM

## 2021-09-08 DIAGNOSIS — R42 DIZZINESS: ICD-10-CM

## 2021-09-08 DIAGNOSIS — R23.9 RECENT SKIN CHANGES: ICD-10-CM

## 2021-09-08 DIAGNOSIS — Z23 IMMUNIZATION DUE: ICD-10-CM

## 2021-09-08 LAB
ALBUMIN SERPL-MCNC: 4 G/DL (ref 3.5–5)
ALP SERPL-CCNC: 73 U/L (ref 45–120)
ALT SERPL W P-5'-P-CCNC: 21 U/L (ref 0–45)
ANION GAP SERPL CALCULATED.3IONS-SCNC: 11 MMOL/L (ref 5–18)
AST SERPL W P-5'-P-CCNC: 22 U/L (ref 0–40)
BILIRUB SERPL-MCNC: 1.4 MG/DL (ref 0–1)
BUN SERPL-MCNC: 19 MG/DL (ref 8–28)
CALCIUM SERPL-MCNC: 9.8 MG/DL (ref 8.5–10.5)
CHLORIDE BLD-SCNC: 108 MMOL/L (ref 98–107)
CHOLEST SERPL-MCNC: 140 MG/DL
CO2 SERPL-SCNC: 24 MMOL/L (ref 22–31)
CREAT SERPL-MCNC: 1 MG/DL (ref 0.6–1.1)
ERYTHROCYTE [DISTWIDTH] IN BLOOD BY AUTOMATED COUNT: 13.4 % (ref 10–15)
GFR SERPL CREATININE-BSD FRML MDRD: 56 ML/MIN/1.73M2
GLUCOSE BLD-MCNC: 88 MG/DL (ref 70–125)
HCT VFR BLD AUTO: 39.7 % (ref 35–47)
HDLC SERPL-MCNC: 42 MG/DL
HGB BLD-MCNC: 13 G/DL (ref 11.7–15.7)
LDLC SERPL CALC-MCNC: 63 MG/DL
MCH RBC QN AUTO: 30.6 PG (ref 26.5–33)
MCHC RBC AUTO-ENTMCNC: 32.7 G/DL (ref 31.5–36.5)
MCV RBC AUTO: 93 FL (ref 78–100)
PLATELET # BLD AUTO: 227 10E3/UL (ref 150–450)
POTASSIUM BLD-SCNC: 4.6 MMOL/L (ref 3.5–5)
PROT SERPL-MCNC: 7.3 G/DL (ref 6–8)
RBC # BLD AUTO: 4.25 10E6/UL (ref 3.8–5.2)
SODIUM SERPL-SCNC: 143 MMOL/L (ref 136–145)
TRIGL SERPL-MCNC: 176 MG/DL
WBC # BLD AUTO: 5.7 10E3/UL (ref 4–11)

## 2021-09-08 PROCEDURE — 36415 COLL VENOUS BLD VENIPUNCTURE: CPT | Performed by: FAMILY MEDICINE

## 2021-09-08 PROCEDURE — 99213 OFFICE O/P EST LOW 20 MIN: CPT | Mod: 25 | Performed by: FAMILY MEDICINE

## 2021-09-08 PROCEDURE — G0008 ADMIN INFLUENZA VIRUS VAC: HCPCS | Performed by: FAMILY MEDICINE

## 2021-09-08 PROCEDURE — 82306 VITAMIN D 25 HYDROXY: CPT | Performed by: FAMILY MEDICINE

## 2021-09-08 PROCEDURE — 99397 PER PM REEVAL EST PAT 65+ YR: CPT | Mod: 25 | Performed by: FAMILY MEDICINE

## 2021-09-08 PROCEDURE — 90662 IIV NO PRSV INCREASED AG IM: CPT | Performed by: FAMILY MEDICINE

## 2021-09-08 PROCEDURE — 80061 LIPID PANEL: CPT | Performed by: FAMILY MEDICINE

## 2021-09-08 PROCEDURE — 80053 COMPREHEN METABOLIC PANEL: CPT | Performed by: FAMILY MEDICINE

## 2021-09-08 PROCEDURE — 85027 COMPLETE CBC AUTOMATED: CPT | Performed by: FAMILY MEDICINE

## 2021-09-08 ASSESSMENT — MIFFLIN-ST. JEOR: SCORE: 1546.79

## 2021-09-08 ASSESSMENT — ACTIVITIES OF DAILY LIVING (ADL): CURRENT_FUNCTION: NO ASSISTANCE NEEDED

## 2021-09-08 NOTE — PROGRESS NOTES
"SUBJECTIVE:   Colleen Samaniego is a 71 year old female who presents for Preventive Visit.    Chief Complaint   Patient presents with     Wellness Visit      Patient has been advised of split billing requirements and indicates understanding: Yes   Are you in the first 12 months of your Medicare coverage?  No    Healthy Habits:     In general, how would you rate your overall health?  Good    Frequency of exercise:  None    Do you usually eat at least 4 servings of fruit and vegetables a day, include whole grains    & fiber and avoid regularly eating high fat or \"junk\" foods?  No    Taking medications regularly:  Yes    Medication side effects:  Lightheadedness    Ability to successfully perform activities of daily living:  No assistance needed    Home Safety:  Lack of grab bars in the bathroom    Hearing Impairment:  Difficulty following a conversation in a noisy restaurant or crowded room, need to ask people to speak up or repeat themselves and difficulty understanding soft or whispered speech    In the past 6 months, have you been bothered by leaking of urine? Yes    In general, how would you rate your overall mental or emotional health?  Good      PHQ-2 Total Score: 2    Additional concerns today:  Yes    Do you feel safe in your environment? Yes    Have you ever done Advance Care Planning? (For example, a Health Directive, POLST, or a discussion with a medical provider or your loved ones about your wishes): Yes, patient states has an Advance Care Planning document and will bring a copy to the clinic.    Fall risk  Fallen 2 or more times in the past year?: No  Any fall with injury in the past year?: No    Cognitive Screening   1) Repeat 3 items (Leader, Season, Table)      2) Clock draw:   NORMAL  3) 3 item recall: Recalls 3 objects  Results: NORMAL clock, 1-2 items recalled: COGNITIVE IMPAIRMENT LESS LIKELY    Mini-CogTM Copyright [employer]. Licensed by the author for use in [facility]; reprinted with " permission (moshe@[facility]). All rights reserved.      Reviewed and updated as needed this visit by Provider  Tobacco  Allergies  Meds  Problems  Med Hx  Surg Hx  Fam Hx         Social History     Tobacco Use     Smoking status: Former Smoker     Quit date: 2001     Years since quittin.8     Smokeless tobacco: Never Used   Substance Use Topics     Alcohol use: No     If you drink alcohol do you typically have >3 drinks per day or >7 drinks per week? No    Alcohol Use 2021   Prescreen: >3 drinks/day or >7 drinks/week? Not Applicable   Prescreen: >3 drinks/day or >7 drinks/week? -       Tried to get in a month ago, bad cold, couldn't get into clinic. Went to [facility], no x-ray. No labs. Diagnosed with sinus infection. Treated with doxycycline. Had some side effects. Thought she may have been getting a bladder infection, symptoms self resolved.     Chest symptoms are the same. Exertional. Vacuuming, cutting grass, shoveling snow. Any upper body exertion.   Scheduled to follow up with cardiology on 10/15/21.     Having episodes of vertigo. Can come on when sitting, suddenly feels dizzy, room spinning, symptoms are brief. Nearly daily, 2-3 times in a day. Sometimes happens when gets up too quickly or turns head. Has to grab onto something. Other times, sitting or reading a book, has a floaty sensation. Hold self as steady as possible. Has also happened laying in bed. No tachycardia or palpations. No chest pain. Lasts very briefly. Hearing overall worsening, no abrupt change. Can't afford new hearing aids.     Occasionally has a flare of reflux, overall adequately managed with the Pepcid.     Unsure if needs refills. Pharmacy usually calls.      Urine leaking - previously with coughing/sneezing, now just happens. Small amounts. Has urgency.     Current providers sharing in care for this patient include:   Patient Care Team:  Susan Maddox DO as PCP - General (Family Practice)  Tan  "Susan Burkett DO as Assigned PCP  Devan Lima MD as Assigned Heart and Vascular Provider    The following health maintenance items are reviewed in Epic and correct as of today:  Health Maintenance Due   Topic Date Due     ANNUAL REVIEW OF  ORDERS  Never done     DEXA  05/10/2021     FALL RISK ASSESSMENT  08/18/2021     INFLUENZA VACCINE (1) 09/01/2021     Mammogram Screening: Mammogram Screening: Recommended mammography every 1-2 years with patient discussion and risk factor consideration    Review of Systems  10 point ROS negative except for as reported above.     OBJECTIVE:   /64   Pulse 57   Ht 1.549 m (5' 1\")   Wt 109.9 kg (242 lb 6 oz)   LMP  (LMP Unknown)   BMI 45.80 kg/m   Estimated body mass index is 45.8 kg/m  as calculated from the following:    Height as of this encounter: 1.549 m (5' 1\").    Weight as of this encounter: 109.9 kg (242 lb 6 oz).  Physical Exam  GENERAL: Colleen is a pleasant, morbidly obese female, no acute distress.  HEENT: Sclera white, no cervical lymphadenopathy, no thyromegaly. No carotid bruits. No nystagmus during brief episode of dizziness.  HEART: Regular rate and rhythm, no murmurs.  LUNGS: Clear to auscultation bilaterally, unlabored.  ABDOMEN: Obese, soft, nontender to palpation.  EXTREMITIES: No lower extremity edema, pulses intact.  PSYCH: Mood is good, normal affect, appropriately groomed, normal thought content.  NEURO: Speech intact, normal gait, moving all extremities without difficulty, no gross deficits present.    ASSESSMENT / PLAN:     1. Encounter for annual wellness exam in Medicare patient  Reviewed health history and health maintenance recommendations. Reviewed Medicare questions.  Colleen does continue to endorse some urinary incontinence. Symptoms are manageable with pads, is not interested in medication management. If symptoms are worsening, we will plan to refer to urology for further evaluation.    2. Age-related osteoporosis without current " pathological fracture  - DX Hip/Pelvis/Spine; Future  - Comprehensive metabolic panel (BMP + Alb, Alk Phos, ALT, AST, Total. Bili, TP)  - Vitamin D Deficiency    Tolerating alendronate well, will update DEXA scan to reassess.    3. Medication management  - CBC with platelets  - Lipid Profile (Chol, Trig, HDL, LDL calc)    4. Dizziness  - Holter Monitor 24 hour Adult Pediatric; Future    In discussing symptoms, it seems most consistent with BPPV, episodes are brief and feels like she is spinning. However she had an episode in clinic well at rest without movement and no nystagmus was present during episode. Will proceed with a Holter monitor to rule out cardiac arrhythmia. Consider referral to [facility] dizzy and balance center if symptoms are persistent.    5. Recent skin changes  - Adult Dermatology Referral; Future    Recommend skin check with dermatology.    6. Immunization due  - INFLUENZA, QUAD, HIGH DOSE, PF, 65YR + (FLUZONE HD)     7. Morbid obesity (H)  See above and below.     8. 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      Patient has been advised of split billing requirements and indicates understanding: Yes  COUNSELING:  Reviewed preventive health counseling, as reflected in patient instructions       Regular exercise       Healthy diet/nutrition       Vision screening       Hearing screening       Dental care       Bladder control       Fall risk prevention       Osteoporosis prevention/bone health       Colon cancer screening       The 10-year ASCVD risk score (Lazaro ROE Jr., et al., 2013) is: 11.4%    Values used to calculate the score:      Age: 71 years      Sex: Female      Is Non- : No      Diabetic: No      Tobacco smoker: No      Systolic Blood Pressure: 129 mmHg      Is BP treated: No      HDL Cholesterol: 42 mg/dL      Total Cholesterol: 140 mg/dL    Estimated body mass index is 45.8 kg/m  as calculated from the following:     "Height as of this encounter: 1.549 m (5' 1\").    Weight as of this encounter: 109.9 kg (242 lb 6 oz).    Weight management plan: Discussed healthy diet and exercise guidelines    She reports that she quit smoking about 19 years ago. She has never used smokeless tobacco.      Appropriate preventive services were discussed with this patient, including applicable screening as appropriate for cardiovascular disease, diabetes, osteopenia/osteoporosis, and glaucoma.  As appropriate for age/gender, discussed screening for colorectal cancer, prostate cancer, breast cancer, and cervical cancer. Checklist reviewing preventive services available has been given to the patient.    Reviewed patients plan of care and provided an AVS. The Intermediate Care Plan ( asthma action plan, low back pain action plan, and migraine action plan) for Colleen meets the Care Plan requirement. This Care Plan has been established and reviewed with the Patient.    Counseling Resources:  ATP IV Guidelines  Pooled Cohorts Equation Calculator  Breast Cancer Risk Calculator  Breast Cancer: Medication to Reduce Risk  FRAX Risk Assessment  ICSI Preventive Guidelines  Dietary Guidelines for Americans, 2010  [employer]'s MyPlate  ASA Prophylaxis  Lung CA Screening    Susan Maddox [facility]    Identified Health Risks:    She is at risk for lack of exercise and has been provided with information to increase physical activity for the benefit of her well-being.  The patient was counseled and encouraged to consider modifying their diet and eating habits. She was provided with information on recommended healthy diet options.  The patient was provided with written information regarding signs of hearing loss.  Information on urinary incontinence and treatment options given to patient.  "

## 2021-09-08 NOTE — PATIENT INSTRUCTIONS
Patient Education     Benign Paroxysmal Positional Vertigo     Your health care provider may move your head in certain ways to treat your BPPV.   Benign paroxysmal positional vertigo (BPPV) is a problem with the inner ear. The inner ear contains the vestibular system. This system is what helps you keep your balance. BPPV causes a feeling of spinning. It is a common problem of the vestibular system.   Understanding the vestibular system  The vestibular system of the ear is made up of very tiny parts. They include the utricle, saccule, and semicircular canals. The utricle is a tiny organ that contains calcium crystals. In some people, the crystals can move into the semicircular canals. When this happens, the system no longer works as it should. This causes BPPV. Benign means it is not life threatening. Paroxysmal means it happens suddenly. Positional means that it happens when you move your head. Vertigo is a feeling of spinning.   What causes BPPV?  Causes include injury to your head or neck. Other problems with the vestibular system may cause BPPV. In many people, the cause of BPPV is not known.   Symptoms of BPPV  You may have repeated feelings of spinning (vertigo). The vertigo usually lasts less than 1 minute. Some movements, such as rolling over in bed, can bring on vertigo.   Diagnosing BPPV  Your primary healthcare provider may diagnose and treat your BPPV. Or you may see an ear, nose, and throat healthcare provider (otolaryngologist). In some cases, you may see a healthcare provider who focuses on the nervous system (neurologist).   The healthcare provider will ask about your symptoms and your medical history. He or she will examine you. You may have hearing and balance tests. As part of the exam, your healthcare provider may have you move your head and body in certain ways. If you have BPPV, the movements can bring on vertigo. Your provider will also look for abnormal movements of your eyes. You may have  other tests to check your vestibular or nervous systems.   Treatment for BPPV  Your healthcare provider may try to move the calcium crystals. This is done by having you move your head and neck in certain ways. This treatment is safe and often works well. You may also be told to do these movements at home. You may still have vertigo for a few weeks. Your healthcare provider will recheck your symptoms, usually in about a month. Special physical therapy may also be part of treatment. In rare cases, surgery may be needed for BPPV that does not go away. Talk with your healthcare provider about whether it is safe for you to drive.   When to call the healthcare provider  Call your healthcare provider right away if you have any of these:    Symptoms that do not go away with treatment    Symptoms that get worse    New symptoms  [employer] last reviewed this educational content on 2/1/2020 2000-2021 [employer]. All rights reserved. This information is not intended as a substitute for professional medical care. Always follow your healthcare professional's instructions.           Patient Education   Personalized Prevention Plan  You are due for the preventive services outlined below.  Your care team is available to assist you in scheduling these services.  If you have already completed any of these items, please share that information with your care team to update in your medical record.  Health Maintenance Due   Topic Date Due     ANNUAL REVIEW OF HM ORDERS  Never done     Osteoporosis Screening  05/10/2021     FALL RISK ASSESSMENT  08/18/2021     Flu Vaccine (1) 09/01/2021       Exercise for a Healthier Heart  You may wonder how you can improve the health of your heart. If you re thinking about exercise, you re on the right track. You don t need to become an athlete. But you do need a certain amount of brisk exercise to help strengthen your heart. If you have been diagnosed with a heart condition, your  healthcare provider may advise exercise to help stabilize your condition. To help make exercise a habit, choose safe, fun activities.      Exercise with a friend. When activity is fun, you're more likely to stick with it.   Before you start  Check with your healthcare provider before starting an exercise program. This is especially important if you have not been active for a while. It's also important if you have a long-term (chronic) health problem such as heart disease, diabetes, or obesity. Or if you are at high risk for having these problems.   Why exercise?  Exercising regularly offers many healthy rewards. It can help you do all of the following:     Improve your blood cholesterol level to help prevent further heart trouble    Lower your blood pressure to help prevent a stroke or heart attack    Control diabetes, or reduce your risk of getting this disease    Improve your heart and lung function    Reach and stay at a healthy weight    Make your muscles stronger so you can stay active    Prevent falls and fractures by slowing the loss of bone mass (osteoporosis)    Manage stress better    Reduce your blood pressure    Improve your sense of self and your body image  Exercise tips      Ease into your routine. Set small goals. Then build on them. If you are not sure what your activity level should be, talk with your healthcare provider first before starting an exercise routine.    Exercise on most days. Aim for a total of 150 minutes (2 hours and 30 minutes) or more of moderate-intensity aerobic activity each week. Or 75 minutes (1 hour and 15 minutes) or more of vigorous-intensity aerobic activity each week. Or try for a combination of both. Moderate activity means that you breathe heavier and your heart rate increases but you can still talk. Think about doing 40 minutes of moderate exercise, 3 to 4 times a week. For best results, activity should last for about 40 minutes to lower blood pressure and cholesterol.  It's OK to work up to the 40-minute period over time. Examples of moderate-intensity activity are walking 1 mile in 15 minutes. Or doing 30 to 45 minutes of yard work.    Step up your daily activity level.  Along with your exercise program, try being more active the whole day. Walk instead of drive. Or park further away so that you take more steps each day. Do more household tasks or yard work. You may not be able to meet the advised mount of physical activity. But doing some moderate- or vigorous-intensity aerobic activity can help reduce your risk for heart disease. Your healthcare provider can help you figure out what is best for you.    Choose 1 or more activities you enjoy.  Walking is one of the easiest things you can do. You can also try swimming, riding a bike, dancing, or taking an exercise class.    When to call your healthcare provider  Call your healthcare provider if you have any of these:     Chest pain or feel dizzy or lightheaded    Burning, tightness, pressure, or heaviness in your chest, neck, shoulders, back, or arms    Abnormal shortness of breath    More joint or muscle pain    A very fast or irregular heartbeat (palpitations)  [employer] last reviewed this educational content on 7/1/2019 2000-2021 [employer]. All rights reserved. This information is not intended as a substitute for professional medical care. Always follow your healthcare professional's instructions.          Understanding USDA MyPlate  The USDA has guidelines to help you make healthy food choices. These are called MyPlate. MyPlate shows the food groups that make up healthy meals using the image of a place setting. Before you eat, think about the healthiest choices for what to put on your plate or in your cup or bowl. To learn more about building a healthy plate, visit www.choosemyplate.gov.    The food groups    Fruits. Any fruit or 100% fruit juice counts as part of the Fruit Group. Fruits may be fresh, canned,  frozen, or dried, and may be whole, cut-up, or pureed. Make 1/2 of your plate fruits and vegetables.    Vegetables. Any vegetable or 100% vegetable juice counts as a member of the Vegetable Group. Vegetables may be fresh, frozen, canned, or dried. They can be served raw or cooked and may be whole, cut-up, or mashed. Make 1/2 of your plate fruits and vegetables.    Grains. All foods made from grains are part of the Grains Group. These include wheat, rice, oats, cornmeal, and barley. Grains are often used to make foods such as bread, pasta, oatmeal, cereal, tortillas, and grits. Grains should be no more than 1/4 of your plate. At least half of your grains should be whole grains.    Protein. This group includes meat, poultry, seafood, beans and peas, eggs, processed soy products (such as tofu), nuts (including nut butters), and seeds. Make protein choices no more than 1/4 of your plate. Meat and poultry choices should be lean or low fat.    Dairy. The Dairy Group includes all fluid milk products and foods made from milk that contain calcium, such as yogurt and cheese. (Foods that have little calcium, such as cream, butter, and cream cheese, are not part of this group.) Most dairy choices should be low-fat or fat-free.    Oils. Oils aren't a food group, but they do contain essential nutrients. However it's important to watch your intake of oils. These are fats that are liquid at room temperature. They include canola, corn, olive, soybean, vegetable, and sunflower oil. Foods that are mainly oil include mayonnaise, certain salad dressings, and soft margarines. You likely already get your daily oil allowance from the foods you eat.  Things to limit  Eating healthy also means limiting these things in your diet:       Salt (sodium). Many processed foods have a lot of sodium. To keep sodium intake down, eat fresh vegetables, meats, poultry, and seafood when possible. Purchase low-sodium, reduced-sodium, or no-salt-added food  products at the store. And don't add salt to your meals at home. Instead, season them with herbs and spices such as dill, oregano, cumin, and paprika. Or try adding flavor with lemon or lime zest and juice.    Saturated fat. Saturated fats are most often found in animal products such as beef, pork, and chicken. They are often solid at room temperature, such as butter. To reduce your saturated fat intake, choose leaner cuts of meat and poultry. And try healthier cooking methods such as grilling, broiling, roasting, or baking. For a simple lower-fat swap, use plain nonfat yogurt instead of mayonnaise when making potato salad or macaroni salad.    Added sugars. These are sugars added to foods. They are in foods such as ice cream, candy, soda, fruit drinks, sports drinks, energy drinks, cookies, pastries, jams, and syrups. Cut down on added sugars by sharing sweet treats with a family member or friend. You can also choose fruit for dessert, and drink water or other unsweetened beverages.     [employer] last reviewed this educational content on 6/1/2020 2000-2021 [employer]. All rights reserved. This information is not intended as a substitute for professional medical care. Always follow your healthcare professional's instructions.          Signs of Hearing Loss      Hearing much better with one ear can be a sign of hearing loss.   Hearing loss is a problem shared by many people. In fact, it is one of the most common health problems, particularly as people age. Most people age 65 and older have some hearing loss. By age 80, almost everyone does. Hearing loss often occurs slowly over the years. So you may not realize your hearing has gotten worse.  Have your hearing checked  Call your healthcare provider if you:    Have to strain to hear normal conversation    Have to watch other people s faces very carefully to follow what they re saying    Need to ask people to repeat what they ve said    Often  misunderstand what people are saying    Turn the volume of the television or radio up so high that others complain    Feel that people are mumbling when they re talking to you    Find that the effort to hear leaves you feeling tired and irritated    Notice, when using the phone, that you hear better with one ear than the other  [employer] last reviewed this educational content on 1/1/2020 2000-2021 [employer]. All rights reserved. This information is not intended as a substitute for professional medical care. Always follow your healthcare professional's instructions.          Urinary Incontinence, Female (Adult)   Urinary incontinence means loss of bladder control. This problem affects many women, especially as they get older. If you have incontinence, you may be embarrassed to ask for help. But know that this problem can be treated.   Types of Incontinence  There are different types of incontinence. Two of the main types are described here. You can have more than one type.     Stress incontinence. With this type, urine leaks when pressure (stress) is put on the bladder. This may happen when you cough, sneeze, or laugh. Stress incontinence most often occurs because the pelvic floor muscles that support the bladder and urethra are weak. This can happen after pregnancy and vaginal childbirth or a hysterectomy. It can also be due to excess body weight or hormone changes.    Urge incontinence (also called overactive bladder). With this type, a sudden urge to urinate is felt often. This may happen even though there may not be much urine in the bladder. The need to urinate often during the night is common. Urge incontinence most often occurs because of bladder spasms. This may be due to bladder irritation or infection. Damage to bladder nerves or pelvic muscles, constipation, and certain medicines can also lead to urge incontinence.  Treatment depends on the cause. Further evaluation is needed to find the  type you have. This will likely include an exam and certain tests. Based on the results, you and your healthcare provider can then plan treatment. Until a diagnosis is made, the home care tips below can help ease symptoms.   Home care    Do pelvic floor muscle exercises, if they are prescribed. The pelvic floor muscles help support the bladder and urethra. Many women find that their symptoms improve when doing special exercises that strengthen these muscles. To do the exercises, contract the muscles you would use to stop your stream of urine. But do this when you re not urinating. Hold for 10 seconds, then relax. Repeat 10 to 20 times in a row, at least 3 times a day. Your healthcare provider may give you other instructions for how to do the exercises and how often.    Keep a bladder diary. This helps track how often and how much you urinate over a set period of time. Bring this diary with you to your next visit with the provider. The information can help your provider learn more about your bladder problem.    Lose weight, if advised to by your provider. Extra weight puts pressure on the bladder. Your provider can help you create a weight-loss plan that s right for you. This may include exercising more and making certain diet changes.    Don't have foods and drinks that may irritate the bladder. These can include alcohol and caffeinated drinks.    Quit smoking. Smoking and other tobacco use can lead to a long-term (chronic) cough that strains the pelvic floor muscles. Smoking may also damage the bladder and urethra. Talk with your provider about treatments or methods you can use to quit smoking.    If drinking large amounts of fluid makes you have symptoms, you may be advised to limit your fluid intake. You may also be advised to drink most of your fluids during the day and to limit fluids at night.    If you re worried about urine leakage or accidents, you may wear absorbent pads to catch urine. Change the pads  often. This helps reduce discomfort. It may also reduce the risk of skin or bladder infections.    Follow-up care  Follow up with your healthcare provider, or as directed. It may take some to find the right treatment for your problem. But healthy lifestyle changes can be made right away. These include such things as exercising on a regular basis, eating a healthy diet, losing weight (if needed), and quitting smoking. Your treatment plan may include special therapies or medicines. Certain procedures or surgery may also be options. Talk about any questions you have with your provider.   When to seek medical advice  Call the healthcare provider right away if any of these occur:    Fever of 100.4 F (38 C) or higher, or as directed by your provider    Bladder pain or fullness    Belly swelling    Nausea or vomiting    Back pain    Weakness, dizziness, or fainting  [employer] last reviewed this educational content on 1/1/2020 2000-2021 [employer]. All rights reserved. This information is not intended as a substitute for professional medical care. Always follow your healthcare professional's instructions.

## 2021-09-09 LAB — DEPRECATED CALCIDIOL+CALCIFEROL SERPL-MC: 85 UG/L (ref 30–80)

## 2021-09-10 NOTE — RESULT ENCOUNTER NOTE
Patient updated by [employer] message with lab results.   -------------------------------------  Serina Colleen,Also great to see you.  Thanks for coming into the clinic.  Your lab results have returned.Overall, things look good.Your vitamin D level is actually a little bit high.  I probably back off on the supplementation dose that you are taking.Your cholesterol labs look reasonable.  Continue current cholesterol management.Your GFR (kidney function) is a touch low, will continue to monitor this periodically.   Your total bilirubin was also just a touch elevated, again nothing to be concerned about right now.  We should continue to monitor this.Your blood counts all normal.Please reach out by [employer] with any questions or concerns.  Susan Maddox, DO

## 2021-09-16 ENCOUNTER — HOSPITAL ENCOUNTER (OUTPATIENT)
Dept: CARDIOLOGY | Facility: HOSPITAL | Age: 71
Discharge: HOME | End: 2021-09-16
Attending: FAMILY MEDICINE | Admitting: FAMILY MEDICINE
Payer: COMMERCIAL

## 2021-09-16 DIAGNOSIS — R42 DIZZINESS: ICD-10-CM

## 2021-09-16 PROCEDURE — 93225 XTRNL ECG REC<48 HRS REC: CPT

## 2021-09-16 PROCEDURE — 93227 XTRNL ECG REC<48 HR R&I: CPT | Performed by: INTERNAL MEDICINE

## 2021-09-17 NOTE — RESULT ENCOUNTER NOTE
Patient updated by [employer] message with holter monitor results. Sinus rhythm during episodes of dizziness.  1 asymptomatic, 7 beat run of non-sustained VT. Hx CABG x 3 in 2017.  Last echo November 2020, cardiology had recommended 1 year follow up.  -----------------------------------  Serina Macias,  As discussed, dizziness episodes were not associated with an abnormal heart rhythm.  Unfortunately, you didn't have your typical spell while wearing the monitor, so we cannot definitely rule out a heart source.  I would still recommend next step in evaluation be the [facility] if symptoms worsening or persistent. Reach out by [employer] if you would like to proceed with that referral.  Susan Maddox, DO

## 2021-09-20 DIAGNOSIS — M81.0 AGE-RELATED OSTEOPOROSIS WITHOUT CURRENT PATHOLOGICAL FRACTURE: ICD-10-CM

## 2021-09-20 RX ORDER — ALENDRONATE SODIUM 70 MG/1
TABLET ORAL
Qty: 12 TABLET | Refills: 0 | Status: SHIPPED | OUTPATIENT
Start: 2021-09-20 | End: 2021-12-14

## 2021-09-21 NOTE — TELEPHONE ENCOUNTER
"Routing refill request to provider for review/approval because:  Labs not current:  Dexa    Last Written Prescription Date:  8/12/20  Last Fill Quantity: 12,  # refills: 3   Last office visit provider:  11/11/20     Requested Prescriptions   Pending Prescriptions Disp Refills     alendronate (FOSAMAX) 70 MG tablet 12 tablet 3       Bisphosphonates Failed - 9/20/2021  3:14 PM        Failed - Dexa on file within past 2 years     Please review last Dexa result.           Passed - Recent (12 mo) or future (30 days) visit within the authorizing provider's specialty     Patient has had an office visit with the authorizing provider or a provider within the authorizing providers department within the previous 12 mos or has a future within next 30 days. See \"Patient Info\" tab in inbasket, or \"Choose Columns\" in Meds & Orders section of the refill encounter.              Passed - Medication is active on med list        Passed - Patient is age 18 or older        Passed - Normal serum creatinine on file within past 12 months     Recent Labs   Lab Test 09/08/21  1136   CR 1.00       Ok to refill medication if creatinine is low               rafy rose RN 09/20/21 7:51 PM  "

## 2021-09-21 NOTE — TELEPHONE ENCOUNTER
Patient has 2 dexa scans ordered and has not yet scheduled. Can you help patient with dexa appointment? 3 month supply sent to pharmacy while awaiting dexa results.  Susan Maddox, DO

## 2021-09-21 NOTE — TELEPHONE ENCOUNTER
Dr. Maddox-  Called and spoke to pt. She said that she tried calling to scheduled the DEXA but was told that there was no order so the caller told her they would send you a message requesting one. Pt will try again to schedule an appt and if she is still unable, she will send us a [place] message let us know. I called over to [facility] and was told that the order was for [facility] and that the order was never released. Pt had also just called over and was given the number to schedule at [facility].

## 2021-09-21 NOTE — TELEPHONE ENCOUNTER
This is interesting. I would prefer my patients do their dexas at [facility] as I prefer the assessment documentation better.   I am not seeing in the order where the location preference is located and will need to look into how to specify location.  Susan Maddox, DO

## 2021-09-28 DIAGNOSIS — E78.5 DYSLIPIDEMIA, GOAL LDL BELOW 70: ICD-10-CM

## 2021-09-28 DIAGNOSIS — Z95.1 S/P CABG X 3: ICD-10-CM

## 2021-09-28 DIAGNOSIS — I25.10 CAD (CORONARY ARTERY DISEASE): ICD-10-CM

## 2021-09-28 RX ORDER — EZETIMIBE 10 MG/1
10 TABLET ORAL DAILY
Qty: 90 TABLET | Refills: 0 | Status: SHIPPED | OUTPATIENT
Start: 2021-09-28 | End: 2021-10-15

## 2021-09-28 RX ORDER — METOPROLOL TARTRATE 25 MG/1
25 TABLET, FILM COATED ORAL 2 TIMES DAILY
Qty: 180 TABLET | Refills: 0 | Status: SHIPPED | OUTPATIENT
Start: 2021-09-28 | End: 2021-10-15

## 2021-09-30 DIAGNOSIS — I25.10 CORONARY ARTERY DISEASE DUE TO CALCIFIED CORONARY LESION: ICD-10-CM

## 2021-09-30 DIAGNOSIS — I25.84 CORONARY ARTERY DISEASE DUE TO CALCIFIED CORONARY LESION: ICD-10-CM

## 2021-09-30 RX ORDER — ROSUVASTATIN CALCIUM 40 MG/1
40 TABLET, COATED ORAL AT BEDTIME
Qty: 90 TABLET | Refills: 1 | Status: SHIPPED | OUTPATIENT
Start: 2021-09-30 | End: 2021-10-15

## 2021-10-04 DIAGNOSIS — K21.9 GASTROESOPHAGEAL REFLUX DISEASE WITHOUT ESOPHAGITIS: ICD-10-CM

## 2021-10-05 RX ORDER — FAMOTIDINE 40 MG/1
40 TABLET, FILM COATED ORAL DAILY
Qty: 90 TABLET | Refills: 3 | Status: SHIPPED | OUTPATIENT
Start: 2021-10-05 | End: 2022-11-01

## 2021-10-15 ENCOUNTER — ANCILLARY PROCEDURE (OUTPATIENT)
Dept: MAMMOGRAPHY | Facility: CLINIC | Age: 71
End: 2021-10-15
Attending: FAMILY MEDICINE
Payer: COMMERCIAL

## 2021-10-15 ENCOUNTER — OFFICE VISIT (OUTPATIENT)
Dept: CARDIOLOGY | Facility: CLINIC | Age: 71
End: 2021-10-15
Payer: COMMERCIAL

## 2021-10-15 VITALS
HEART RATE: 66 BPM | RESPIRATION RATE: 16 BRPM | WEIGHT: 248 LBS | HEIGHT: 61 IN | SYSTOLIC BLOOD PRESSURE: 118 MMHG | BODY MASS INDEX: 46.82 KG/M2 | DIASTOLIC BLOOD PRESSURE: 64 MMHG

## 2021-10-15 DIAGNOSIS — Z12.31 VISIT FOR SCREENING MAMMOGRAM: ICD-10-CM

## 2021-10-15 DIAGNOSIS — E78.00 HYPERCHOLESTEREMIA: ICD-10-CM

## 2021-10-15 DIAGNOSIS — I25.10 CORONARY ARTERY DISEASE INVOLVING NATIVE CORONARY ARTERY OF NATIVE HEART WITHOUT ANGINA PECTORIS: ICD-10-CM

## 2021-10-15 DIAGNOSIS — I25.84 CORONARY ARTERY DISEASE DUE TO CALCIFIED CORONARY LESION: Primary | ICD-10-CM

## 2021-10-15 DIAGNOSIS — E78.5 DYSLIPIDEMIA, GOAL LDL BELOW 70: ICD-10-CM

## 2021-10-15 DIAGNOSIS — I25.10 CORONARY ARTERY DISEASE DUE TO CALCIFIED CORONARY LESION: Primary | ICD-10-CM

## 2021-10-15 PROCEDURE — 77063 BREAST TOMOSYNTHESIS BI: CPT

## 2021-10-15 PROCEDURE — 99214 OFFICE O/P EST MOD 30 MIN: CPT | Performed by: INTERNAL MEDICINE

## 2021-10-15 RX ORDER — EZETIMIBE 10 MG/1
10 TABLET ORAL DAILY
Qty: 90 TABLET | Refills: 3 | Status: SHIPPED | OUTPATIENT
Start: 2021-10-15 | End: 2022-11-01

## 2021-10-15 RX ORDER — ROSUVASTATIN CALCIUM 40 MG/1
40 TABLET, COATED ORAL DAILY
Qty: 90 TABLET | Refills: 3 | Status: SHIPPED | OUTPATIENT
Start: 2021-10-15 | End: 2022-09-07

## 2021-10-15 RX ORDER — METOPROLOL SUCCINATE 50 MG/1
50 TABLET, EXTENDED RELEASE ORAL DAILY
Qty: 30 TABLET | Refills: 11 | Status: SHIPPED | OUTPATIENT
Start: 2021-10-15 | End: 2022-01-20

## 2021-10-15 ASSESSMENT — MIFFLIN-ST. JEOR: SCORE: 1572.3

## 2021-10-15 NOTE — LETTER
10/15/2021    Susan Maddox, DO  [address]  [facility] [address]    RE: Colleen Samaniego       Dear Colleague,    I had the pleasure of seeing Colleen Samaniego in the [facility].        Cardiology Progress Note     Assessment:  Coronary artery disease status post coronary artery bypass graft surgery in 2016, stable  Chronic  incisional chest discomfort with stable sternum/normal CT, improved  Chronic exertional dyspnea, multifactorial, suspect combination of deconditioning and obesity playing a major role, no evidence of heart failure, diaphragmatic paralysis or intrinsic lung disease, stable  Benign positional vertigo  Hypercholesterolemia, on Crestor and Zetia, good control  Obesity  History of DVT and PE, on chronic anticoagulation  DJD right knee, status post replacement      Plan:  She appears to be very stable from cardiac standpoint.  She has chronic dyspnea which has not changed over the years.  We have done extensive cardiac work-up which showed no significant abnormalities.  I suspect that most of his shortness of breath is related to her body habitus.  I encouraged her to stay physically active    I told her that vertigo is unrelated to any heart rhythm disturbances including arrhythmias.  She may benefit from the physical therapy to reduce symptoms if they persist    Follow-up in 1 year    Subjective:   This is 71 year old female who comes in today for follow-up visit.  She reports no new episode of chest pain.  She continues to feel short of breath when she exerts herself.  She has not had heart palpitations or syncope.  She does feel occasional spinning sensation.  It happens regardless of what she does.  She never lost consciousness or feel faint during those episodes.  She underwent Holter monitor as ordered by her primary physician for those symptoms.    Review of Systems:   Negative other than history of present illness    Objective:   BP  "118/64 (BP Location: Left arm, Patient Position: Sitting, Cuff Size: Adult Large)   Pulse 66   Resp 16   Ht 1.549 m (5' 1\")   Wt 112.5 kg (248 lb)   LMP  (LMP Unknown)   BMI 46.86 kg/m    Physical Exam:  GENERAL: no distress  NECK: No JVD  LUNGS: Clear to auscultation.  CARDIAC: regular rhythm, S1 & S2 normal.  No heaves, thrills, gallops or murmurs.  ABDOMEN: flat, negative hepatosplenomegaly, soft and non-tender.  EXTREMITIES: No evidence of cyanosis, clubbing or edema.    Current Outpatient Medications   Medication Sig Dispense Refill     acetaminophen (TYLENOL) 500 MG tablet [ACETAMINOPHEN (TYLENOL) 500 MG TABLET] Take 2 tablets (1,000 mg total) by mouth 3 (three) times a day. (Patient taking differently: Take 1,000 mg by mouth daily as needed )  0     alendronate (FOSAMAX) 70 MG tablet [ALENDRONATE (FOSAMAX) 70 MG TABLET] 1 TABLET ONCE EVERY 7 DAYS IN THE A.M. ON EMPTY STOMACH WITH FULL GLASS WATER 30 MINUTES BEFORE FOOD 12 tablet 0     aspirin 81 MG EC tablet [ASPIRIN 81 MG EC TABLET] Take 81 mg by mouth daily.       calcium carbonate (OS-ALECIA) 600 mg (1,500 mg) tablet [CALCIUM CARBONATE (OS-ALECIA) 600 MG (1,500 MG) TABLET] Take 600 mg by mouth daily with lunch.        cholecalciferol, vitamin D3, 1,000 unit (25 mcg) tablet Take 1,000 Units by mouth daily        ezetimibe (ZETIA) 10 MG tablet Take 1 tablet (10 mg) by mouth daily 90 tablet 3     famotidine (PEPCID) 40 MG tablet Take 1 tablet (40 mg) by mouth daily 90 tablet 3     metoprolol succinate ER (TOPROL-XL) 50 MG 24 hr tablet Take 1 tablet (50 mg) by mouth daily 30 tablet 11     multivitamin with minerals (THERA-M) 9 mg iron-400 mcg Tab tablet [MULTIVITAMIN WITH MINERALS (THERA-M) 9 MG IRON-400 MCG TAB TABLET] Take 1 tablet by mouth daily.       rosuvastatin (CRESTOR) 40 MG tablet Take 1 tablet (40 mg) by mouth daily 90 tablet 3       Cardiographics:    EC2018 read by me sinus rhythm incomplete right bundle branch block no changes from " 2017     Coronary angio: May 2017      LM mild dz  LAD severe dz mid with patent LIMA, small diagonal  Circ severe dz in OM with patnet SVG  RCA severe dz in mid with patent SVG  LVEDP 8mmHg  Aortic root mildly enlarged       Patient began having severe chest pain when laying flat, worse with direct pressure over sternal incision site.      Imp/plan:  1. Chest pain - musculoskeletal -related to either incisino or to statin - schedule CTnon contrast of sternum, follow up with CT surgery in clinic, hold statin for four weeks and rechalleng  2. CAD s/p CABG 3 vessel all grafts patent      Echo: April 2016  Normal left ventricular size and systolic function.   Left ventricular ejection fraction is visually estimated to be 65%.   No significant valvular abnormalities.      Holter: July 2017  The predominant rhythm is sinus rhythm which is present throughout the monitoring period.  Rates range from 52 bpm to 160 bpm.  There were no significant pauses or bradycardia.      Only for atrial premature beats were present over 24 hours.    No ventricular beats were present.      Symptoms:  Symptoms of Dizziness were associated with Sinus rhythm 83 bpm.  Symptoms of shortness of breath were associated with sinus tachycardia rate 104 and 128 bpm.  Symptoms of chest pressure was associated with sinus rhythm rate 91 bpm, all without ectopy      September 2021  A single 7 beat run of nonsustained VT was noted.  Otherwise first-degree AV block was present with minor sinus arrhythmia.       Lab Results    Chemistry/lipid CBC Cardiac Enzymes/BNP/TSH/INR   Recent Labs   Lab Test 09/08/21  1136   CHOL 140   HDL 42*   LDL 63   TRIG 176*     Recent Labs   Lab Test 09/08/21  1136 08/18/20  1109 05/01/19  0804   LDL 63 70 77     Recent Labs   Lab Test 09/08/21  1136      POTASSIUM 4.6   CHLORIDE 108*   CO2 24   GLC 88   BUN 19   CR 1.00   GFRESTIMATED 56*   ALECIA 9.8     Recent Labs   Lab Test 09/08/21  1136 08/18/20  1109 05/01/19  0804    CR 1.00 0.97 0.90     Recent Labs   Lab Test 04/28/16  1448   A1C 5.5          Recent Labs   Lab Test 09/08/21  1136   WBC 5.7   HGB 13.0   HCT 39.7   MCV 93        Recent Labs   Lab Test 09/08/21  1136 08/18/20  1109 01/15/20  0949   HGB 13.0 12.4 13.1    No results for input(s): TROPONINI in the last 48348 hours.  Recent Labs   Lab Test 10/07/19  0907   BNP 30     Recent Labs   Lab Test 08/18/20  1109   TSH 1.42     Recent Labs   Lab Test 05/11/18  0622 05/10/18  0550 05/09/18  0646   INR 1.39* 1.25* 1.13*                        Thank you for allowing me to participate in the care of your patient.      Sincerely,     Devan Lima MD     [facility]  cc:   Susan Maddox, DO  [address] [place] [address]

## 2021-10-15 NOTE — PATIENT INSTRUCTIONS
Colleen Samaniego,    It was a pleasure to see you today at the [facility].     My recommendations after this visit include:    Same medications    TIFFANIE Lima MD, FACC, VINAY

## 2021-10-26 ENCOUNTER — ANCILLARY PROCEDURE (OUTPATIENT)
Dept: BONE DENSITY | Facility: CLINIC | Age: 71
End: 2021-10-26
Attending: FAMILY MEDICINE
Payer: COMMERCIAL

## 2021-10-26 PROCEDURE — 77080 DXA BONE DENSITY AXIAL: CPT | Mod: 26 | Performed by: INTERNAL MEDICINE

## 2022-01-20 DIAGNOSIS — I25.84 CORONARY ARTERY DISEASE DUE TO CALCIFIED CORONARY LESION: ICD-10-CM

## 2022-01-20 DIAGNOSIS — I25.10 CORONARY ARTERY DISEASE DUE TO CALCIFIED CORONARY LESION: ICD-10-CM

## 2022-01-20 RX ORDER — METOPROLOL SUCCINATE 50 MG/1
50 TABLET, EXTENDED RELEASE ORAL DAILY
Qty: 90 TABLET | Refills: 2 | Status: SHIPPED | OUTPATIENT
Start: 2022-01-20 | End: 2022-09-07

## 2022-09-06 DIAGNOSIS — E78.00 HYPERCHOLESTEREMIA: ICD-10-CM

## 2022-09-06 DIAGNOSIS — I25.84 CORONARY ARTERY DISEASE DUE TO CALCIFIED CORONARY LESION: ICD-10-CM

## 2022-09-06 DIAGNOSIS — I25.10 CORONARY ARTERY DISEASE DUE TO CALCIFIED CORONARY LESION: ICD-10-CM

## 2022-09-07 RX ORDER — ROSUVASTATIN CALCIUM 40 MG/1
TABLET, COATED ORAL
Qty: 90 TABLET | Refills: 3 | Status: SHIPPED | OUTPATIENT
Start: 2022-09-07 | End: 2022-11-01

## 2022-09-07 RX ORDER — METOPROLOL SUCCINATE 50 MG/1
TABLET, EXTENDED RELEASE ORAL
Qty: 90 TABLET | Refills: 2 | Status: SHIPPED | OUTPATIENT
Start: 2022-09-07 | End: 2022-11-01

## 2022-10-01 ENCOUNTER — HEALTH MAINTENANCE LETTER (OUTPATIENT)
Age: 72
End: 2022-10-01

## 2022-10-14 ENCOUNTER — TRANSFERRED RECORDS (OUTPATIENT)
Dept: HEALTH INFORMATION MANAGEMENT | Facility: CLINIC | Age: 72
End: 2022-10-14

## 2022-10-14 LAB — HEMOCCULT STL QL IA: NEGATIVE

## 2022-10-17 ENCOUNTER — ANCILLARY PROCEDURE (OUTPATIENT)
Dept: MAMMOGRAPHY | Facility: CLINIC | Age: 72
End: 2022-10-17
Attending: FAMILY MEDICINE
Payer: COMMERCIAL

## 2022-10-17 ENCOUNTER — OFFICE VISIT (OUTPATIENT)
Dept: CARDIOLOGY | Facility: CLINIC | Age: 72
End: 2022-10-17
Payer: COMMERCIAL

## 2022-10-17 VITALS
SYSTOLIC BLOOD PRESSURE: 132 MMHG | HEART RATE: 82 BPM | BODY MASS INDEX: 47.77 KG/M2 | DIASTOLIC BLOOD PRESSURE: 70 MMHG | RESPIRATION RATE: 16 BRPM | WEIGHT: 253 LBS | HEIGHT: 61 IN

## 2022-10-17 DIAGNOSIS — I25.10 CORONARY ARTERY DISEASE DUE TO CALCIFIED CORONARY LESION: ICD-10-CM

## 2022-10-17 DIAGNOSIS — I25.84 CORONARY ARTERY DISEASE DUE TO CALCIFIED CORONARY LESION: ICD-10-CM

## 2022-10-17 DIAGNOSIS — E78.00 HYPERCHOLESTEREMIA: Primary | ICD-10-CM

## 2022-10-17 DIAGNOSIS — Z12.31 VISIT FOR SCREENING MAMMOGRAM: ICD-10-CM

## 2022-10-17 LAB
CHOLEST SERPL-MCNC: 153 MG/DL
HDLC SERPL-MCNC: 39 MG/DL
LDLC SERPL CALC-MCNC: 71 MG/DL
NONHDLC SERPL-MCNC: 114 MG/DL
TRIGL SERPL-MCNC: 216 MG/DL

## 2022-10-17 PROCEDURE — 77067 SCR MAMMO BI INCL CAD: CPT

## 2022-10-17 PROCEDURE — 80061 LIPID PANEL: CPT | Performed by: INTERNAL MEDICINE

## 2022-10-17 PROCEDURE — 99214 OFFICE O/P EST MOD 30 MIN: CPT | Performed by: INTERNAL MEDICINE

## 2022-10-17 PROCEDURE — 36415 COLL VENOUS BLD VENIPUNCTURE: CPT | Performed by: INTERNAL MEDICINE

## 2022-10-17 ASSESSMENT — ENCOUNTER SYMPTOMS
EYE PAIN: 0
DIARRHEA: 0
HEMATURIA: 0
SORE THROAT: 0
ABDOMINAL PAIN: 0
FREQUENCY: 0
ARTHRALGIAS: 0
HEADACHES: 0
FEVER: 0
CONSTIPATION: 0
DIZZINESS: 0
PARESTHESIAS: 0
DYSURIA: 0
PALPITATIONS: 0
COUGH: 0
HEMATOCHEZIA: 0
MYALGIAS: 0
JOINT SWELLING: 0
SHORTNESS OF BREATH: 0
HEARTBURN: 0
BREAST MASS: 0
WEAKNESS: 0
CHILLS: 1
NAUSEA: 0

## 2022-10-17 ASSESSMENT — ACTIVITIES OF DAILY LIVING (ADL): CURRENT_FUNCTION: NO ASSISTANCE NEEDED

## 2022-10-17 NOTE — LETTER
October 20, 2022      Colleen Samaniego  [address]  [place] [address]        Dear ,    We are writing to inform you of your test results.    Your test results fall within the expected range(s) or remain unchanged from previous results.  Please continue with current treatment plan and follow up in 1 year.    Devan Lima MD   10/18/2022  8:13 AM CDT       Stable, no medication changes           Resulted Orders   Lipid panel reflex to direct LDL Fasting   Result Value Ref Range    Cholesterol 153 <200 mg/dL    Triglycerides 216 (H) <150 mg/dL    Direct Measure HDL 39 (L) >=50 mg/dL    LDL Cholesterol Calculated 71 <=100 mg/dL    Non HDL Cholesterol 114 <130 mg/dL    Narrative    Cholesterol  Desirable:  <200 mg/dL    Triglycerides  Normal:  Less than 150 mg/dL  Borderline High:  150-199 mg/dL  High:  200-499 mg/dL  Very High:  Greater than or equal to 500 mg/dL    Direct Measure HDL  Female:  Greater than or equal to 50 mg/dL   Male:  Greater than or equal to 40 mg/dL    LDL Cholesterol  Desirable:  <100mg/dL  Above Desirable:  100-129 mg/dL   Borderline High:  130-159 mg/dL   High:  160-189 mg/dL   Very High:  >= 190 mg/dL    Non HDL Cholesterol  Desirable:  130 mg/dL  Above Desirable:  130-159 mg/dL  Borderline High:  160-189 mg/dL  High:  190-219 mg/dL  Very High:  Greater than or equal to 220 mg/dL       If you have any questions or concerns, please call the clinic at the number listed above.       Sincerely,      Devan Lima MD

## 2022-10-17 NOTE — PROGRESS NOTES
"    Cardiology Progress Note     Assessment:  Coronary artery disease status post coronary artery bypass graft surgery in 2016, stable  Chronic  incisional chest discomfort with stable sternum/normal CT, improved  Chronic exertional dyspnea, multifactorial, suspect combination of deconditioning and obesity playing a major role, no evidence of heart failure, diaphragmatic paralysis or intrinsic lung disease, stable  Benign positional vertigo  Hypercholesterolemia, on Crestor and Zetia, good control  Obesity  History of DVT and PE  DJD right knee, status post replacement      Plan:  Check lipid profile  Continue  the cardiac medications unchanged  Urged her to become more physically active    Follow-up in 1 year    Subjective:   This is 72 year old female who comes in today for follow-up visit.  She reports no new cardiac symptoms.  She continues to have mild incisional chest discomfort.  She also gets short of breath with physical activities.  She denies significant weight gain, PND, orthopnea.  She has not had chest pains with physical activities.  She bruises easily but denies blood in the stool or urine    Review of Systems:   Negative other than history of present illness    Objective:   /70 (BP Location: Left arm, Patient Position: Sitting, Cuff Size: Adult Regular)   Pulse 82   Resp 16   Ht 1.549 m (5' 1\")   Wt 114.8 kg (253 lb)   LMP  (LMP Unknown)   BMI 47.80 kg/m    Physical Exam:  GENERAL: no distress  NECK: No JVD  LUNGS: Clear to auscultation.  CARDIAC: regular rhythm, S1 & S2 normal.  No heaves, thrills, gallops or murmurs.  ABDOMEN: flat, negative hepatosplenomegaly, soft and non-tender.  EXTREMITIES: No evidence of cyanosis, clubbing or edema.    Current Outpatient Medications   Medication Sig Dispense Refill     alendronate (FOSAMAX) 70 MG tablet TAKE 1 TABLET ONCE EVERY 7 DAYS IN THE A.M. ON EMPTY STOMACH WITH FULL GLASS WATER 30MIN BEFORE FOOD 12 tablet 3     aspirin 81 MG EC tablet " [ASPIRIN 81 MG EC TABLET] Take 81 mg by mouth daily.       calcium carbonate (OS-ALECIA) 600 mg (1,500 mg) tablet [CALCIUM CARBONATE (OS-ALECIA) 600 MG (1,500 MG) TABLET] Take 600 mg by mouth daily with lunch.        cholecalciferol, vitamin D3, 1,000 unit (25 mcg) tablet Take 1,000 Units by mouth daily        ezetimibe (ZETIA) 10 MG tablet Take 1 tablet (10 mg) by mouth daily 90 tablet 3     famotidine (PEPCID) 40 MG tablet Take 1 tablet (40 mg) by mouth daily 90 tablet 3     metoprolol succinate ER (TOPROL XL) 50 MG 24 hr tablet TAKE 1 TABLET BY MOUTH EVERY DAY 90 tablet 2     multivitamin with minerals (THERA-M) 9 mg iron-400 mcg Tab tablet [MULTIVITAMIN WITH MINERALS (THERA-M) 9 MG IRON-400 MCG TAB TABLET] Take 1 tablet by mouth daily.       rosuvastatin (CRESTOR) 40 MG tablet TAKE 1 TABLET BY MOUTH EVERY DAY 90 tablet 3       Cardiographics:    EC2018 read by me sinus rhythm incomplete right bundle branch block no changes from 2017     Coronary angio: May 2017      LM mild dz  LAD severe dz mid with patent LIMA, small diagonal  Circ severe dz in OM with patnet SVG  RCA severe dz in mid with patent SVG  LVEDP 8mmHg  Aortic root mildly enlarged       Patient began having severe chest pain when laying flat, worse with direct pressure over sternal incision site.      Imp/plan:  1. Chest pain - musculoskeletal -related to either incisino or to statin - schedule CTnon contrast of sternum, follow up with CT surgery in clinic, hold statin for four weeks and rechalleng  2. CAD s/p CABG 3 vessel all grafts patent      Echo: 2016  Normal left ventricular size and systolic function.   Left ventricular ejection fraction is visually estimated to be 65%.   No significant valvular abnormalities.      Holter: 2017  The predominant rhythm is sinus rhythm which is present throughout the monitoring period.  Rates range from 52 bpm to 160 bpm.  There were no significant pauses or bradycardia.      Only for atrial  premature beats were present over 24 hours.    No ventricular beats were present.      Symptoms:  Symptoms of Dizziness were associated with Sinus rhythm 83 bpm.  Symptoms of shortness of breath were associated with sinus tachycardia rate 104 and 128 bpm.  Symptoms of chest pressure was associated with sinus rhythm rate 91 bpm, all without ectopy        September 2021  A single 7 beat run of nonsustained VT was noted.  Otherwise first-degree AV block was present with minor sinus arrhythmia.     Lab Results    Chemistry/lipid CBC Cardiac Enzymes/BNP/TSH/INR   Recent Labs   Lab Test 09/08/21  1136   CHOL 140   HDL 42*   LDL 63   TRIG 176*     Recent Labs   Lab Test 09/08/21  1136 08/18/20  1109 05/01/19  0804   LDL 63 70 77     Recent Labs   Lab Test 09/08/21  1136      POTASSIUM 4.6   CHLORIDE 108*   CO2 24   GLC 88   BUN 19   CR 1.00   GFRESTIMATED 56*   ALECIA 9.8     Recent Labs   Lab Test 09/08/21  1136 08/18/20  1109 05/01/19  0804   CR 1.00 0.97 0.90     Recent Labs   Lab Test 04/28/16  1448   A1C 5.5          Recent Labs   Lab Test 09/08/21  1136   WBC 5.7   HGB 13.0   HCT 39.7   MCV 93        Recent Labs   Lab Test 09/08/21  1136 08/18/20  1109 01/15/20  0949   HGB 13.0 12.4 13.1    No results for input(s): TROPONINI in the last 73675 hours.  Recent Labs   Lab Test 10/07/19  0907   BNP 30     Recent Labs   Lab Test 08/18/20  1109   TSH 1.42     Recent Labs   Lab Test 05/11/18  0622 05/10/18  0550 05/09/18  0646   INR 1.39* 1.25* 1.13*

## 2022-10-17 NOTE — LETTER
"10/17/2022    Susan Maddox, DO  [address]  [facility] [address]    RE: Colleen Samaniego       Dear Colleague,     I had the pleasure of seeing Colleen Samaniego in the [facility].      Cardiology Progress Note     Assessment:  Coronary artery disease status post coronary artery bypass graft surgery in 2016, stable  Chronic  incisional chest discomfort with stable sternum/normal CT, improved  Chronic exertional dyspnea, multifactorial, suspect combination of deconditioning and obesity playing a major role, no evidence of heart failure, diaphragmatic paralysis or intrinsic lung disease, stable  Benign positional vertigo  Hypercholesterolemia, on Crestor and Zetia, good control  Obesity  History of DVT and PE  DJD right knee, status post replacement      Plan:  Check lipid profile  Continue  the cardiac medications unchanged  Urged her to become more physically active    Follow-up in 1 year    Subjective:   This is 72 year old female who comes in today for follow-up visit.  She reports no new cardiac symptoms.  She continues to have mild incisional chest discomfort.  She also gets short of breath with physical activities.  She denies significant weight gain, PND, orthopnea.  She has not had chest pains with physical activities.  She bruises easily but denies blood in the stool or urine    Review of Systems:   Negative other than history of present illness    Objective:   /70 (BP Location: Left arm, Patient Position: Sitting, Cuff Size: Adult Regular)   Pulse 82   Resp 16   Ht 1.549 m (5' 1\")   Wt 114.8 kg (253 lb)   LMP  (LMP Unknown)   BMI 47.80 kg/m    Physical Exam:  GENERAL: no distress  NECK: No JVD  LUNGS: Clear to auscultation.  CARDIAC: regular rhythm, S1 & S2 normal.  No heaves, thrills, gallops or murmurs.  ABDOMEN: flat, negative hepatosplenomegaly, soft and non-tender.  EXTREMITIES: No evidence of cyanosis, clubbing or edema.    Current Outpatient Medications   Medication " Sig Dispense Refill     alendronate (FOSAMAX) 70 MG tablet TAKE 1 TABLET ONCE EVERY 7 DAYS IN THE A.M. ON EMPTY STOMACH WITH FULL GLASS WATER 30MIN BEFORE FOOD 12 tablet 3     aspirin 81 MG EC tablet [ASPIRIN 81 MG EC TABLET] Take 81 mg by mouth daily.       calcium carbonate (OS-ALECIA) 600 mg (1,500 mg) tablet [CALCIUM CARBONATE (OS-ALECIA) 600 MG (1,500 MG) TABLET] Take 600 mg by mouth daily with lunch.        cholecalciferol, vitamin D3, 1,000 unit (25 mcg) tablet Take 1,000 Units by mouth daily        ezetimibe (ZETIA) 10 MG tablet Take 1 tablet (10 mg) by mouth daily 90 tablet 3     famotidine (PEPCID) 40 MG tablet Take 1 tablet (40 mg) by mouth daily 90 tablet 3     metoprolol succinate ER (TOPROL XL) 50 MG 24 hr tablet TAKE 1 TABLET BY MOUTH EVERY DAY 90 tablet 2     multivitamin with minerals (THERA-M) 9 mg iron-400 mcg Tab tablet [MULTIVITAMIN WITH MINERALS (THERA-M) 9 MG IRON-400 MCG TAB TABLET] Take 1 tablet by mouth daily.       rosuvastatin (CRESTOR) 40 MG tablet TAKE 1 TABLET BY MOUTH EVERY DAY 90 tablet 3       Cardiographics:    EC2018 read by me sinus rhythm incomplete right bundle branch block no changes from 2017     Coronary angio: May 2017      LM mild dz  LAD severe dz mid with patent LIMA, small diagonal  Circ severe dz in OM with patnet SVG  RCA severe dz in mid with patent SVG  LVEDP 8mmHg  Aortic root mildly enlarged       Patient began having severe chest pain when laying flat, worse with direct pressure over sternal incision site.      Imp/plan:  1. Chest pain - musculoskeletal -related to either incisino or to statin - schedule CTnon contrast of sternum, follow up with CT surgery in clinic, hold statin for four weeks and rechalleng  2. CAD s/p CABG 3 vessel all grafts patent      Echo: 2016  Normal left ventricular size and systolic function.   Left ventricular ejection fraction is visually estimated to be 65%.   No significant valvular abnormalities.      Holter: 2017  The  predominant rhythm is sinus rhythm which is present throughout the monitoring period.  Rates range from 52 bpm to 160 bpm.  There were no significant pauses or bradycardia.      Only for atrial premature beats were present over 24 hours.    No ventricular beats were present.      Symptoms:  Symptoms of Dizziness were associated with Sinus rhythm 83 bpm.  Symptoms of shortness of breath were associated with sinus tachycardia rate 104 and 128 bpm.  Symptoms of chest pressure was associated with sinus rhythm rate 91 bpm, all without ectopy        September 2021  A single 7 beat run of nonsustained VT was noted.  Otherwise first-degree AV block was present with minor sinus arrhythmia.     Lab Results    Chemistry/lipid CBC Cardiac Enzymes/BNP/TSH/INR   Recent Labs   Lab Test 09/08/21  1136   CHOL 140   HDL 42*   LDL 63   TRIG 176*     Recent Labs   Lab Test 09/08/21  1136 08/18/20  1109 05/01/19  0804   LDL 63 70 77     Recent Labs   Lab Test 09/08/21  1136      POTASSIUM 4.6   CHLORIDE 108*   CO2 24   GLC 88   BUN 19   CR 1.00   GFRESTIMATED 56*   ALECIA 9.8     Recent Labs   Lab Test 09/08/21  1136 08/18/20  1109 05/01/19  0804   CR 1.00 0.97 0.90     Recent Labs   Lab Test 04/28/16  1448   A1C 5.5          Recent Labs   Lab Test 09/08/21  1136   WBC 5.7   HGB 13.0   HCT 39.7   MCV 93        Recent Labs   Lab Test 09/08/21  1136 08/18/20  1109 01/15/20  0949   HGB 13.0 12.4 13.1    No results for input(s): TROPONINI in the last 79812 hours.  Recent Labs   Lab Test 10/07/19  0907   BNP 30     Recent Labs   Lab Test 08/18/20  1109   TSH 1.42     Recent Labs   Lab Test 05/11/18  0622 05/10/18  0550 05/09/18  0646   INR 1.39* 1.25* 1.13*                        Thank you for allowing me to participate in the care of your patient.      Sincerely,     Devan Lima MD     [facility]  cc:   Devan Lima MD  [address]  [address]  [place]  [address]

## 2022-10-23 ENCOUNTER — TRANSFERRED RECORDS (OUTPATIENT)
Dept: HEALTH INFORMATION MANAGEMENT | Facility: CLINIC | Age: 72
End: 2022-10-23

## 2022-10-24 ENCOUNTER — OFFICE VISIT (OUTPATIENT)
Dept: FAMILY MEDICINE | Facility: CLINIC | Age: 72
End: 2022-10-24
Payer: COMMERCIAL

## 2022-10-24 VITALS
WEIGHT: 252 LBS | SYSTOLIC BLOOD PRESSURE: 122 MMHG | HEIGHT: 61 IN | DIASTOLIC BLOOD PRESSURE: 45 MMHG | HEART RATE: 61 BPM | RESPIRATION RATE: 16 BRPM | BODY MASS INDEX: 47.58 KG/M2 | TEMPERATURE: 98.7 F

## 2022-10-24 DIAGNOSIS — I20.89 EXERTIONAL ANGINA (H): ICD-10-CM

## 2022-10-24 DIAGNOSIS — H25.9 SENILE CATARACT OF LEFT EYE, UNSPECIFIED AGE-RELATED CATARACT TYPE: ICD-10-CM

## 2022-10-24 DIAGNOSIS — E66.01 MORBID OBESITY (H): ICD-10-CM

## 2022-10-24 DIAGNOSIS — Z01.818 PREOP GENERAL PHYSICAL EXAM: Primary | ICD-10-CM

## 2022-10-24 DIAGNOSIS — I25.10 CORONARY ARTERY DISEASE DUE TO CALCIFIED CORONARY LESION: ICD-10-CM

## 2022-10-24 DIAGNOSIS — I25.84 CORONARY ARTERY DISEASE DUE TO CALCIFIED CORONARY LESION: ICD-10-CM

## 2022-10-24 PROCEDURE — 99214 OFFICE O/P EST MOD 30 MIN: CPT | Performed by: FAMILY MEDICINE

## 2022-10-24 NOTE — PATIENT INSTRUCTIONS
Preparing for Your Surgery  Getting started  A nurse will call you to review your health history and instructions. They will give you an arrival time based on your scheduled surgery time. Please be ready to share:    Your doctor's clinic name and phone number    Your medical, surgical and anesthesia history    A list of allergies and sensitivities    A list of medicines, including herbal treatments and over-the-counter drugs    Whether the patient has a legal guardian (ask how to send us the papers in advance)  Please tell us if you're pregnant--or if there's any chance you might be pregnant. Some surgeries may injure a fetus (unborn baby), so they require a pregnancy test. Surgeries that are safe for a fetus don't always need a test, and you can choose whether to have one.   If you have a child who's having surgery, please ask for a copy of Preparing for Your Child's Surgery.    Preparing for surgery    Within 10 to 30 days of surgery: Have a pre-op exam (sometimes called an H&P, or History and Physical). This can be done at a clinic or pre-operative center.  ? If you're having a , you may not need this exam. Talk to your care team.    At your pre-op exam, talk to your care team about all medicines you take. If you need to stop any medicines before surgery, ask when to start taking them again.  ? We do this for your safety. Many medicines can make you bleed too much during surgery. Some change how well surgery (anesthesia) drugs work.    Call your insurance company to let them know you're having surgery. (If you don't have insurance, call [phone].)    Call your clinic if there's any change in your health. This includes signs of a cold or flu (sore throat, runny nose, cough, rash, fever). It also includes a scrape or scratch near the surgery site.    If you have questions on the day of surgery, call your hospital or surgery center.  COVID testing  You may need to be tested for COVID-19 before having  surgery. If so, we will give you instructions (or click here).  Eating and drinking guidelines  For your safety: Unless your surgeon tells you otherwise, follow the guidelines below.    Eat and drink as usual until 8 hours before surgery. After that, no food or milk.    Drink clear liquids until 2 hours before surgery. These are liquids you can see through, like water, Gatorade and Propel Water. You may also have black coffee and tea (no cream or milk).    Nothing by mouth within 2 hours of surgery. This includes gum, candy and breath mints.    If you drink alcohol: Stop drinking it the night before surgery.    If your care team tells you to take medicine on the morning of surgery, it's okay to take it with a sip of water.  Preventing infection    Shower or bathe the night before and morning of your surgery. Follow the instructions your clinic gave you. (If no instructions, use regular soap.)    Don't shave or clip hair near your surgery site. We'll remove the hair if needed.    Don't smoke or vape the morning of surgery. You may chew nicotine gum up to 2 hours before surgery. A nicotine patch is okay.  ? Note: Some surgeries require you to completely quit smoking and nicotine. Check with your surgeon.    Your care team will make every effort to keep you safe from infection. We will:  ? Clean our hands often with soap and water (or an alcohol-based hand rub).  ? Clean the skin at your surgery site with a special soap that kills germs.  ? Give you a special gown to keep you warm. (Cold raises the risk of infection.)  ? Wear special hair covers, masks, gowns and gloves during surgery.  ? Give antibiotic medicine, if prescribed. Not all surgeries need antibiotics.  What to bring on the day of surgery    Photo ID and insurance card    Copy of your health care directive, if you have one    Glasses and hearing aides (bring cases)  ? You can't wear contacts during surgery    Inhaler and eye drops, if you use them (tell us  about these when you arrive)    CPAP machine or breathing device, if you use them    A few personal items, if spending the night    If you have . . .  ? A pacemaker, ICD (cardiac defibrillator) or other implant: Bring the ID card.  ? An implanted stimulator: Bring the remote control.  ? A legal guardian: Bring a copy of the certified (court-stamped) guardianship papers.  Please remove any jewelry, including body piercings. Leave jewelry and other valuables at home.  If you're going home the day of surgery    You must have a responsible adult drive you home. They should stay with you overnight as well.    If you don't have someone to stay with you, and you aren't safe to go home alone, we may keep you overnight. Insurance often won't pay for this.  After surgery  If it's hard to control your pain or you need more pain medicine, please call your surgeon's office.  Questions?   If you have any questions for your care team, list them here: _________________________________________________________________________________________________________________________________________________________________________ ____________________________________ ____________________________________ ____________________________________  For informational purposes only. Not to replace the advice of your health care provider. Copyright   2003, 2019 [facility]. All rights reserved. Clinically reviewed by Joan Austin MD. EventWith [id number] - REV 07/22.

## 2022-10-24 NOTE — PROGRESS NOTES
[facility]  [address] [facility] [address]  Phone: [phone]  Fax: [phone]  Primary Provider: Susan Maddox  Pre-op Performing Provider: LETY SWARTZ      PREOPERATIVE EVALUATION:  Today's date: 10/24/2022    Colleen Samaniego is a 72 year old female who presents for a preoperative evaluation.    Surgical Information:  Surgery/Procedure: left cataract surgery   Surgery Location: [facility]   Surgeon: Dr. Frank Sr   Surgery Date: 10/26/2022  Time of Surgery: 8am   Where patient plans to recover: At home with family  Fax number for surgical facility: [phone]    Type of Anesthesia Anticipated: Topical    Assessment & Plan     The proposed surgical procedure is considered LOW risk.    Preop general physical exam  Senile cataract of left eye, unspecified age-related cataract type    Patient is approved for the surgery  Patient approved for Topical/MAC anesthesia  Follow-up as recommended by ophthalmology  She has arranged for a COVID test which she will forward    Coronary artery disease due to calcified coronary lesion  S/P CABG  Exertional angina    She has had recent follow-up with cardiology and is stable from a cardiovascular perspective  Her occasional symptoms may be due to deconditioning   Recommend that she continue aspirin, rosuvastatin, and Zetia  Continue metoprolol as well    Obstructive sleep apnea    Continue CPAP    Morbid obesity    Comorbidities include coronary artery disease and obstructive sleep apnea  Have encouraged weight loss    Addendum:     Patient was evaluated by her primary physician, Dr. Maddox, on November 1.  There have been no interval health changes  Patient is approved for her second cataract surgery.       Risks and Recommendations:  The patient has the following additional risks and recommendations for perioperative complications:   - No identified additional risk factors other than previously  addressed    Medication Instructions:  Patient is on no chronic medications    RECOMMENDATION:  APPROVAL GIVEN to proceed with proposed procedure, without further diagnostic evaluation.    Review of external notes as documented above       Subjective     HPI related to upcoming procedure:     This is a pleasant 72-year-old female, patient of Dr. Vines, who presents to clinic for preoperative evaluation.  She has a history of bilateral cataracts and has noticed blurred vision.  She does experience difficulty driving her car at night.  As a result, she is a candidate for cataract surgery and will have her left eye cataract procedure first.    She does have a history of coronary artery disease with previous coronary artery bypass graft surgery.  She followed up with Dr. Lima, her cardiologist recently.  She does have a history of exertional angina and can get short of breath with exertion though has been stable from a cardiovascular perspective.  She has been encouraged to work on weight loss and increase activity as she is able.    She does have a history of obstructive sleep apnea and uses CPAP.  She has been consistent in taking her medications.  There is a remote history of a DVT and pulmonary embolism following knee replacement surgery.  She has not had any problems recently.    She denies current chest pain or shortness of breath.      Preop Questions 10/24/2022   1. Have you ever had a heart attack or stroke? No   2. Have you ever had surgery on your heart or blood vessels, such as a stent placement, a coronary artery bypass, or surgery on an artery in your head, neck, heart, or legs? YES - s/p CABG   3. Do you have chest pain with activity? YES - stable from cardiac perspective   4. Do you have a history of  heart failure? No   5. Do you currently have a cold, bronchitis or symptoms of other infection? No   6. Do you have a cough, shortness of breath, or wheezing? No   7. Do you or anyone in your family  have previous history of blood clots? YES - h/o DVT following knee replacement   8. Do you or does anyone in your family have a serious bleeding problem such as prolonged bleeding following surgeries or cuts? No   9. Have you ever had problems with anemia or been told to take iron pills? No   10. Have you had any abnormal blood loss such as black, tarry or bloody stools, or abnormal vaginal bleeding? No   11. Have you ever had a blood transfusion? No   12. Are you willing to have a blood transfusion if it is medically needed before, during, or after your surgery? Yes   13. Have you or any of your relatives ever had problems with anesthesia? YES - Patient with prolonged sedation, confusion   14. Do you have sleep apnea, excessive snoring or daytime drowsiness? YES - [place]   14a. Do you have a CPAP machine? Yes   15. Do you have any artifical heart valves or other implanted medical devices like a pacemaker, defibrillator, or continuous glucose monitor? No   16. Do you have artificial joints? YES - Both knees replaced   17. Are you allergic to latex? No       Health Care Directive:  Patient does not have a Health Care Directive or Living Will: Discussed advance care planning with patient; however, patient declined at this time.    Preoperative Review of :   reviewed - no record of controlled substances prescribed.      Status of Chronic Conditions:  See problem list for active medical problems.  Problems all longstanding and stable, except as noted/documented.  See ROS for pertinent symptoms related to these conditions.      Review of Systems  Constitutional, neuro, ENT, endocrine, pulmonary, cardiac, gastrointestinal, genitourinary, musculoskeletal, integument and psychiatric systems are negative, except as otherwise noted.    Patient Active Problem List    Diagnosis Date Noted     Dyspnea on exertion 10/07/2019     Priority: Medium     Morbid obesity (H) 07/17/2019     Priority: Medium     Age-related  osteoporosis without current pathological fracture 05/21/2019     Priority: Medium     Starting Fosamax 05/29/19          History of rheumatic fever      Priority: Medium     Hospitalized in 1953         Hypercholesteremia      Priority: Medium     Obstructive sleep apnea      Priority: Medium     Created by Conversion         History of pulmonary embolism      Priority: Medium     S/P CABG x 3      Priority: Medium     Exertional angina (H) 05/02/2017     Priority: Medium     GERD (gastroesophageal reflux disease) 05/10/2016     Priority: Medium     History of DVT (deep vein thrombosis) 05/05/2016     Priority: Medium     Coronary artery disease due to calcified coronary lesion 04/15/2016     Priority: Medium     S/P knee replacement 09/08/2015     Priority: Medium     Feelings Of Urinary Urgency      Priority: Medium     Created by Conversion          Past Medical History:   Diagnosis Date     Acute blood loss anemia 9/11/2015     Acute pulmonary embolism (H) 9/11/2015     Acute respiratory failure with hypoxia (H) 9/11/2015     Arthritis      Coronary artery disease      DVT (deep venous thrombosis) (H)      Family history of myocardial infarction      GERD (gastroesophageal reflux disease)      High cholesterol      History of anesthesia complications     slow to wake up     History of blood clots      Hypercholesterolemia      Left leg DVT (H) 9/2015    Postop Left TKA     PE (pulmonary thromboembolism) (H)      Pulmonary embolism, bilateral (H) 9/2015    Postop Left TKA     Rheumatic fever     1953     Sleep apnea, obstructive     uses CPAP     Thrombocytopenia (H) 5/5/2016     Past Surgical History:   Procedure Laterality Date     APPENDECTOMY       BREAST CYST ASPIRATION       BYPASS GRAFT ARTERY CORONARY N/A 4/29/2016    Procedure: CORONARY ARTERY BYPASS GRAFT X 3, ENDOSCOPIC VEIN HARVEST, INTERNAL MAMMARY ARTERY, ANESTHESIA TRANSESOPHAGEAL ECHOCARDIOGRAM ;  Surgeon: Rancho Rodas MD;  [facility]  [facility] Main OR;  Service:      CARDIAC CATHETERIZATION  2016    prior to CABG     CARDIAC CATHETERIZATION  2017    no intervention      SECTION       x2     HYSTERECTOMY       JOINT REPLACEMENT      left knee     OOPHORECTOMY       WA CATH PLACEMENT & NJX CORONARY ART ANGIO IMG S&I N/A 2017    Procedure: Coronary Angiogram;  Surgeon: David Youngblood MD;  Location: [facility] Cath Lab;  Service: Cardiology     [place] CATH PLMT L HRT & ARTS W/NJX & ANGIO IMG S&I Left 2017    Procedure: Left Heart Catheterization Without Left Ventriculogram;  Surgeon: David Youngblood MD;  Location: [facility] Cath Lab;  Service: Cardiology     WRIST SURGERY Right      [facility] TOTAL ABDOM HYSTERECTOMY      Description: Total Abdominal Hysterectomy;  Recorded: 2010;     [facility] TOTAL KNEE ARTHROPLASTY Left 2015    Procedure: LEFT TOTAL KNEE ARTHROPLASTY;  Surgeon: Good Suggs MD;  Location: [facility];  Service: Orthopedics     [facility] TOTAL KNEE ARTHROPLASTY Right 2018    Procedure:  RIGHT TOTAL KNEE ARTHROPLASTY COMPLEX BMI 46;  Surgeon: Good Suggs MD;  Location: [facility];  Service: Orthopedics     Current Outpatient Medications   Medication Sig Dispense Refill     alendronate (FOSAMAX) 70 MG tablet TAKE 1 TABLET ONCE EVERY 7 DAYS IN THE A.M. ON EMPTY STOMACH WITH FULL GLASS WATER 30MIN BEFORE FOOD 12 tablet 3     aspirin 81 MG EC tablet [ASPIRIN 81 MG EC TABLET] Take 81 mg by mouth daily.       calcium carbonate (OS-ALECIA) 600 mg (1,500 mg) tablet [CALCIUM CARBONATE (OS-ALECIA) 600 MG (1,500 MG) TABLET] Take 600 mg by mouth daily with lunch.        cholecalciferol, vitamin D3, 1,000 unit (25 mcg) tablet Take 1,000 Units by mouth daily        ezetimibe (ZETIA) 10 MG tablet Take 1 tablet (10 mg) by mouth daily 90 tablet 3     famotidine (PEPCID) 40 MG tablet Take 1 tablet (40 mg) by mouth daily 90 tablet 3     metoprolol succinate ER (TOPROL XL) 50 MG 24 hr tablet TAKE 1 TABLET  "BY MOUTH EVERY DAY 90 tablet 2     multivitamin with minerals (THERA-M) 9 mg iron-400 mcg Tab tablet [MULTIVITAMIN WITH MINERALS (THERA-M) 9 MG IRON-400 MCG TAB TABLET] Take 1 tablet by mouth daily.       rosuvastatin (CRESTOR) 40 MG tablet TAKE 1 TABLET BY MOUTH EVERY DAY 90 tablet 3       Allergies   Allergen Reactions     Aleve [Naproxen] Unknown     Causes acid reflux     Amoxicillin Hives     Atorvastatin Unknown     Annotation: myalgia       Doxycycline Hyclate [Doxycycline] Nausea and Diarrhea     Ibuprofen Swelling     Face swelled and became red/ blotchy.  Can take aspirin without reaction     Simvastatin Unknown     Didn't work for her.  Did not lower cholesterol much        Social History     Tobacco Use     Smoking status: Former     Types: Cigarettes     Quit date: 2001     Years since quittin.9     Smokeless tobacco: Never   Substance Use Topics     Alcohol use: No       History   Drug Use No         Objective     /45 (BP Location: Left arm, Patient Position: Sitting, Cuff Size: Adult Regular)   Pulse 61   Temp 98.7  F (37.1  C) (Oral)   Resp 16   Ht 1.549 m (5' 1\")   Wt 114.3 kg (252 lb)   LMP  (LMP Unknown)   BMI 47.61 kg/m      Physical Exam    GENERAL APPEARANCE: healthy, alert and no distress     EYES: EOMI, PERRL     NECK: no adenopathy, no asymmetry, masses, or scars and thyroid normal to palpation     RESP: lungs clear to auscultation - no rales, rhonchi or wheezes     CV: regular rates and rhythm, normal S1 S2, no S3 or S4 and no murmur, click or rub     MS: extremities normal- no gross deformities noted, no evidence of inflammation in joints, FROM in all extremities.     PSYCH: mentation appears normal. and affect normal/bright     LYMPHATICS: No cervical adenopathy    Recent Labs   Lab Test 21  1136   HGB 13.0         POTASSIUM 4.6   CR 1.00        Diagnostics:  No results found for this or any previous visit (from the past 168 hour(s)).   No EKG " required for low risk surgery (cataract, skin procedure, breast biopsy, etc).    Revised Cardiac Risk Index (RCRI):  The patient has the following serious cardiovascular risks for perioperative complications:   - No serious cardiac risks = 1 points     RCRI Interpretation: 1 point: Class II (low risk - 0.9% complication rate)           Signed Electronically by: Mateo Daniels MD  Copy of this evaluation report is provided to requesting physician.

## 2022-10-31 ASSESSMENT — ENCOUNTER SYMPTOMS
WEAKNESS: 0
SORE THROAT: 0
NAUSEA: 0
HEMATURIA: 0
FREQUENCY: 0
FEVER: 0
DIARRHEA: 0
HEMATOCHEZIA: 0
EYE PAIN: 0
JOINT SWELLING: 0
BREAST MASS: 0
PARESTHESIAS: 0
CONSTIPATION: 0
ABDOMINAL PAIN: 0
DYSURIA: 0
HEARTBURN: 0

## 2022-10-31 ASSESSMENT — ACTIVITIES OF DAILY LIVING (ADL): CURRENT_FUNCTION: NO ASSISTANCE NEEDED

## 2022-11-01 ENCOUNTER — OFFICE VISIT (OUTPATIENT)
Dept: FAMILY MEDICINE | Facility: CLINIC | Age: 72
End: 2022-11-01
Payer: COMMERCIAL

## 2022-11-01 VITALS
WEIGHT: 250.5 LBS | SYSTOLIC BLOOD PRESSURE: 133 MMHG | HEIGHT: 61 IN | HEART RATE: 84 BPM | DIASTOLIC BLOOD PRESSURE: 70 MMHG | BODY MASS INDEX: 47.29 KG/M2

## 2022-11-01 DIAGNOSIS — I25.84 CORONARY ARTERY DISEASE DUE TO CALCIFIED CORONARY LESION: ICD-10-CM

## 2022-11-01 DIAGNOSIS — I25.10 CORONARY ARTERY DISEASE DUE TO CALCIFIED CORONARY LESION: ICD-10-CM

## 2022-11-01 DIAGNOSIS — E78.00 HYPERCHOLESTEREMIA: ICD-10-CM

## 2022-11-01 DIAGNOSIS — H26.9 CATARACT OF RIGHT EYE, UNSPECIFIED CATARACT TYPE: ICD-10-CM

## 2022-11-01 DIAGNOSIS — K21.9 GASTROESOPHAGEAL REFLUX DISEASE WITHOUT ESOPHAGITIS: ICD-10-CM

## 2022-11-01 DIAGNOSIS — L98.9 SKIN LESION: ICD-10-CM

## 2022-11-01 DIAGNOSIS — M81.0 AGE-RELATED OSTEOPOROSIS WITHOUT CURRENT PATHOLOGICAL FRACTURE: ICD-10-CM

## 2022-11-01 DIAGNOSIS — Z12.11 SCREEN FOR COLON CANCER: ICD-10-CM

## 2022-11-01 DIAGNOSIS — R17 SERUM TOTAL BILIRUBIN ELEVATED: ICD-10-CM

## 2022-11-01 DIAGNOSIS — Z00.00 ENCOUNTER FOR MEDICARE ANNUAL WELLNESS EXAM: Primary | ICD-10-CM

## 2022-11-01 LAB
ALBUMIN SERPL BCG-MCNC: 4.4 G/DL (ref 3.5–5.2)
ALP SERPL-CCNC: 82 U/L (ref 35–104)
ALT SERPL W P-5'-P-CCNC: 25 U/L (ref 10–35)
ANION GAP SERPL CALCULATED.3IONS-SCNC: 12 MMOL/L (ref 7–15)
AST SERPL W P-5'-P-CCNC: 28 U/L (ref 10–35)
BILIRUB SERPL-MCNC: 1 MG/DL
BUN SERPL-MCNC: 21.4 MG/DL (ref 8–23)
CALCIUM SERPL-MCNC: 9.6 MG/DL (ref 8.8–10.2)
CHLORIDE SERPL-SCNC: 103 MMOL/L (ref 98–107)
CREAT SERPL-MCNC: 1 MG/DL (ref 0.51–0.95)
DEPRECATED HCO3 PLAS-SCNC: 26 MMOL/L (ref 22–29)
GFR SERPL CREATININE-BSD FRML MDRD: 59 ML/MIN/1.73M2
GLUCOSE SERPL-MCNC: 103 MG/DL (ref 70–99)
POTASSIUM SERPL-SCNC: 4.9 MMOL/L (ref 3.4–5.3)
PROT SERPL-MCNC: 7.6 G/DL (ref 6.4–8.3)
SODIUM SERPL-SCNC: 141 MMOL/L (ref 136–145)

## 2022-11-01 PROCEDURE — G0438 PPPS, INITIAL VISIT: HCPCS | Performed by: FAMILY MEDICINE

## 2022-11-01 PROCEDURE — 80053 COMPREHEN METABOLIC PANEL: CPT | Performed by: FAMILY MEDICINE

## 2022-11-01 PROCEDURE — 99213 OFFICE O/P EST LOW 20 MIN: CPT | Mod: 25 | Performed by: FAMILY MEDICINE

## 2022-11-01 PROCEDURE — 36415 COLL VENOUS BLD VENIPUNCTURE: CPT | Performed by: FAMILY MEDICINE

## 2022-11-01 RX ORDER — MOXIFLOXACIN 5 MG/ML
1 SOLUTION/ DROPS OPHTHALMIC
COMMUNITY
Start: 2022-10-26 | End: 2022-11-09

## 2022-11-01 RX ORDER — ROSUVASTATIN CALCIUM 40 MG/1
40 TABLET, COATED ORAL DAILY
Qty: 90 TABLET | Refills: 3 | Status: SHIPPED | OUTPATIENT
Start: 2022-11-01 | End: 2023-11-03

## 2022-11-01 RX ORDER — FAMOTIDINE 40 MG/1
40 TABLET, FILM COATED ORAL DAILY
Qty: 90 TABLET | Refills: 3 | Status: SHIPPED | OUTPATIENT
Start: 2022-11-01 | End: 2023-11-24

## 2022-11-01 RX ORDER — EZETIMIBE 10 MG/1
10 TABLET ORAL DAILY
Qty: 90 TABLET | Refills: 3 | Status: SHIPPED | OUTPATIENT
Start: 2022-11-01 | End: 2023-11-24

## 2022-11-01 RX ORDER — PREDNISOLONE ACETATE 10 MG/ML
1 SUSPENSION/ DROPS OPHTHALMIC
COMMUNITY
Start: 2022-10-26 | End: 2022-11-25

## 2022-11-01 RX ORDER — METOPROLOL SUCCINATE 50 MG/1
50 TABLET, EXTENDED RELEASE ORAL DAILY
Qty: 90 TABLET | Refills: 3 | Status: SHIPPED | OUTPATIENT
Start: 2022-11-01 | End: 2023-11-03

## 2022-11-01 RX ORDER — ALENDRONATE SODIUM 70 MG/1
TABLET ORAL
Qty: 12 TABLET | Refills: 3 | Status: SHIPPED | OUTPATIENT
Start: 2022-11-01 | End: 2023-09-27

## 2022-11-01 RX ORDER — DICLOFENAC SODIUM 1 MG/ML
1 SOLUTION/ DROPS OPHTHALMIC
COMMUNITY
Start: 2022-10-26 | End: 2022-11-25

## 2022-11-01 ASSESSMENT — ENCOUNTER SYMPTOMS
BREAST MASS: 0
HEARTBURN: 0
EYE PAIN: 0
ABDOMINAL PAIN: 0
WEAKNESS: 0
HEMATURIA: 0
NAUSEA: 0
JOINT SWELLING: 0
DYSURIA: 0
SORE THROAT: 0
DIARRHEA: 0
HEMATOCHEZIA: 0
FEVER: 0
PARESTHESIAS: 0
FREQUENCY: 0
CONSTIPATION: 0

## 2022-11-01 ASSESSMENT — ACTIVITIES OF DAILY LIVING (ADL): CURRENT_FUNCTION: NO ASSISTANCE NEEDED

## 2022-11-01 NOTE — PROGRESS NOTES
"SUBJECTIVE:   Colleen is a 72 year old who presents for Preventive Visit.    Chief Complaint   Patient presents with     Medicare Visit      Second cataract surgery scheduled for 10/23/22.     Patient has been advised of split billing requirements and indicates understanding: Yes  Are you in the first 12 months of your Medicare coverage?  No    Healthy Habits:     In general, how would you rate your overall health?  Good    Frequency of exercise:  None    Do you usually eat at least 4 servings of fruit and vegetables a day, include whole grains    & fiber and avoid regularly eating high fat or \"junk\" foods?  No    Taking medications regularly:  Yes    Medication side effects:  None    Ability to successfully perform activities of daily living:  No assistance needed    Home Safety:  No safety concerns identified    Hearing Impairment:  Difficulty following a conversation in a noisy restaurant or crowded room    In the past 6 months, have you been bothered by leaking of urine? Yes    In general, how would you rate your overall mental or emotional health?  Good      PHQ-2 Total Score: 0    Additional concerns today:  Yes    Do you feel safe in your environment? Yes    Have you ever done Advance Care Planning? (For example, a Health Directive, POLST, or a discussion with a medical provider or your loved ones about your wishes): Yes, patient states has an Advance Care Planning document and will bring a copy to the clinic.      Fall risk  Fallen 2 or more times in the past year?: No  Any fall with injury in the past year?: Yes  click delete button to remove this line now  Cognitive Screening   1) Repeat 3 items (Leader, Season, Table)    2) Clock draw: NORMAL  3) 3 item recall: Recalls 3 objects  Results: NORMAL clock, 1-2 items recalled: COGNITIVE IMPAIRMENT LESS LIKELY    Mini-CogTM Copyright [employer]IG[employer]illo. Licensed by the author for use in [facility] [employer]; reprinted with permission (moshe@.[facility]). All rights reserved.  "       Reviewed and updated as needed this visit by clinical staff   Tobacco  Allergies  Meds              Reviewed and updated as needed this visit by Provider   Tobacco  Allergies  Meds  Problems  Med Hx  Surg Hx  Fam Hx         Social History     Tobacco Use     Smoking status: Former     Types: Cigarettes     Quit date: 2001     Years since quittin.9     Smokeless tobacco: Never   Substance Use Topics     Alcohol use: No     If you drink alcohol do you typically have >3 drinks per day or >7 drinks per week? No    Alcohol Use 10/31/2022   Prescreen: >3 drinks/day or >7 drinks/week? Not Applicable   Prescreen: >3 drinks/day or >7 drinks/week? -         Current providers sharing in care for this patient include:   Patient Care Team:  Susan Maddox DO as PCP - General (Family Medicine)  Susan Maddox DO as Assigned PCP  Devan Lima MD as Assigned Heart and Vascular Provider    The following health maintenance items are reviewed in Epic and correct as of today:  Health Maintenance   Topic Date Due     ANNUAL REVIEW OF HM ORDERS  Never done     COLORECTAL CANCER SCREENING  2021     MEDICARE ANNUAL WELLNESS VISIT  2022     DEXA  10/26/2023     FALL RISK ASSESSMENT  2023     MAMMO SCREENING  10/17/2024     ADVANCE CARE PLANNING  2026     LIPID  10/17/2027     DTAP/TDAP/TD IMMUNIZATION (3 - Td or Tdap) 2028     HEPATITIS C SCREENING  Completed     PHQ-2 (once per calendar year)  Completed     INFLUENZA VACCINE  Completed     Pneumococcal Vaccine: 65+ Years  Completed     COVID-19 Vaccine  Completed     ZOSTER IMMUNIZATION  Addressed     IPV IMMUNIZATION  Aged Out     MENINGITIS IMMUNIZATION  Aged Out     HEPATITIS B IMMUNIZATION  Discontinued     LUNG CANCER SCREENING  Discontinued       Review of Systems   Constitutional: Negative for fever.   HENT: Negative for congestion, ear pain and sore throat.    Eyes: Negative for pain.   Gastrointestinal: Negative  "for abdominal pain, constipation, diarrhea, heartburn, hematochezia and nausea.   Breasts:  Negative for tenderness, breast mass and discharge.   Genitourinary: Positive for urgency. Negative for dysuria, frequency, genital sores, hematuria, pelvic pain, vaginal bleeding and vaginal discharge.   Musculoskeletal: Negative for joint swelling.   Skin: Negative for rash.   Neurological: Negative for weakness and paresthesias.     Urge to go to the bathroom, occasional accident. Managing okay with pads PRN.   Previous hearing aids, both , couldn't afford to replace. Recently purchased OTC ones, are helpful but not as good a before.     OBJECTIVE:   /70 (BP Location: Left arm, Patient Position: Sitting, Cuff Size: Adult Regular)   Pulse 84   Ht 1.543 m (5' 0.75\")   Wt 113.6 kg (250 lb 8 oz)   LMP  (LMP Unknown)   BMI 47.72 kg/m   Estimated body mass index is 47.72 kg/m  as calculated from the following:    Height as of this encounter: 1.543 m (5' 0.75\").    Weight as of this encounter: 113.6 kg (250 lb 8 oz).  Physical Exam  GENERAL: healthy, alert and no distress  EYES: Eyes grossly normal to inspection, PERRL and conjunctivae and sclerae normal  HENT: ear canals and TM's normal, nose and mouth without ulcers or lesions  NECK: no adenopathy, no asymmetry, masses, or scars and thyroid normal to palpation  RESP: lungs clear to auscultation - no rales, rhonchi or wheezes  CV: regular rate and rhythm, normal S1 S2, no S3 or S4, no murmur, click or rub, no peripheral edema and peripheral pulses strong  ABDOMEN: soft, nontender, no hepatosplenomegaly, no masses and bowel sounds normal  MS: no gross musculoskeletal defects noted, no edema  SKIN: no suspicious lesions or rashes  NEURO: Normal strength and tone, mentation intact and speech normal  PSYCH: mentation appears normal, affect normal/bright    ASSESSMENT / PLAN:     1. Encounter for Medicare annual wellness exam  - REVIEW OF HEALTH MAINTENANCE PROTOCOL " ORDERS    Reviewed health history and health maintenance recommendations.    2. Cataract of right eye, unspecified cataract type  - Asymptomatic COVID-19 Virus (Coronavirus) by PCR Nasopharyngeal; Future    Colleen is scheduled for second cataract surgery.  No health changes since initial preop evaluation with Dr. Coy.  She is cleared to proceed with second cataract surgery as scheduled.  She does need an updated COVID test.  Orders placed.    3. Hypercholesteremia  - ezetimibe (ZETIA) 10 MG tablet; Take 1 tablet (10 mg) by mouth daily  Dispense: 90 tablet; Refill: 3  - rosuvastatin (CRESTOR) 40 MG tablet; Take 1 tablet (40 mg) by mouth daily  Dispense: 90 tablet; Refill: 3    The 10-year ASCVD risk score (Rahel DK, et al., 2019) is: 13.6%    Recent cholesterol labs updated, on Crestor and Zetia.  Continue current treatment plan.    4. Skin lesion  - Adult Dermatology Referral; Future    She has a few skin changes which continue to evolve.  Did not follow through with dermatology referral last year she lost contact information.  New referral placed.    5. Age-related osteoporosis without current pathological fracture  - alendronate (FOSAMAX) 70 MG tablet; TAKE 1 TABLET ONCE EVERY 7 DAYS IN THE A.M. ON EMPTY STOMACH WITH FULL GLASS WATER 30MIN BEFORE FOOD  Dispense: 12 tablet; Refill: 3    Continue alendronate.  We will repeat DEXA next year.    6. Gastroesophageal reflux disease without esophagitis  - famotidine (PEPCID) 40 MG tablet; Take 1 tablet (40 mg) by mouth daily  Dispense: 90 tablet; Refill: 3    Symptoms adequately controlled on Pepcid.  Continue current treatment plan.    7. Coronary artery disease due to calcified coronary lesion  - ezetimibe (ZETIA) 10 MG tablet; Take 1 tablet (10 mg) by mouth daily  Dispense: 90 tablet; Refill: 3  - metoprolol succinate ER (TOPROL XL) 50 MG 24 hr tablet; Take 1 tablet (50 mg) by mouth daily  Dispense: 90 tablet; Refill: 3  - rosuvastatin (CRESTOR) 40 MG tablet; Take  "1 tablet (40 mg) by mouth daily  Dispense: 90 tablet; Refill: 3    No anginal symptoms.  Continue current treatment plan.    8. Serum total bilirubin elevated  - Comprehensive metabolic panel (BMP + Alb, Alk Phos, ALT, AST, Total. Bili, TP)    Monitoring labs.     9. Screen for colon cancer  - COLOGUARD([employer])     Patient has been advised of split billing requirements and indicates understanding: Yes      COUNSELING:  Reviewed preventive health counseling, as reflected in patient instructions    Estimated body mass index is 47.72 kg/m  as calculated from the following:    Height as of this encounter: 1.543 m (5' 0.75\").    Weight as of this encounter: 113.6 kg (250 lb 8 oz).    Weight management plan: Discussed healthy diet and exercise guidelines    She reports that she quit smoking about 20 years ago. Her smoking use included cigarettes. She has never used smokeless tobacco.      Appropriate preventive services were discussed with this patient, including applicable screening as appropriate for cardiovascular disease, diabetes, osteopenia/osteoporosis, and glaucoma.  As appropriate for age/gender, discussed screening for colorectal cancer, prostate cancer, breast cancer, and cervical cancer. Checklist reviewing preventive services available has been given to the patient.    Reviewed patients plan of care and provided an AVS. The Basic Care Plan (routine screening as documented in Health Maintenance) and Intermediate Care Plan ( asthma action plan, low back pain action plan, and migraine action plan) for Colleen meets the Care Plan requirement. This Care Plan has been established and reviewed with the Patient.    Counseling Resources:  ATP IV Guidelines  Pooled Cohorts Equation Calculator  Breast Cancer Risk Calculator  Breast Cancer: Medication to Reduce Risk  FRAX Risk Assessment  ICSI Preventive Guidelines  Dietary Guidelines for Americans, 2010  USDA's MyPlate  ASA Prophylaxis  Lung CA Screening    Susan " KENNEY Maddox, [facility]    Identified Health Risks:    She is at risk for lack of exercise and has been provided with information to increase physical activity for the benefit of her well-being.  The patient was counseled and encouraged to consider modifying their diet and eating habits. She was provided with information on recommended healthy diet options.  The patient was provided with written information regarding signs of hearing loss.  Information on urinary incontinence and treatment options given to patient.  She is at risk for falling and has been provided with information to reduce the risk of falling at home.

## 2022-11-01 NOTE — PATIENT INSTRUCTIONS
Patient Education   Personalized Prevention Plan  You are due for the preventive services outlined below.  Your care team is available to assist you in scheduling these services.  If you have already completed any of these items, please share that information with your care team to update in your medical record.  Health Maintenance Due   Topic Date Due     Colorectal Cancer Screening  08/02/2021     Annual Wellness Visit  09/08/2022       Exercise for a Healthier Heart  You may wonder how you can improve the health of your heart. If you re thinking about exercise, you re on the right track. You don t need to become an athlete. But you do need a certain amount of brisk exercise to help strengthen your heart. If you have been diagnosed with a heart condition, your healthcare provider may advise exercise to help stabilize your condition. To help make exercise a habit, choose safe, fun activities.      Exercise with a friend. When activity is fun, you're more likely to stick with it.   Before you start  Check with your healthcare provider before starting an exercise program. This is especially important if you have not been active for a while. It's also important if you have a long-term (chronic) health problem such as heart disease, diabetes, or obesity. Or if you are at high risk for having these problems.   Why exercise?  Exercising regularly offers many healthy rewards. It can help you do all of the following:     Improve your blood cholesterol level to help prevent further heart trouble    Lower your blood pressure to help prevent a stroke or heart attack    Control diabetes, or reduce your risk of getting this disease    Improve your heart and lung function    Reach and stay at a healthy weight    Make your muscles stronger so you can stay active    Prevent falls and fractures by slowing the loss of bone mass (osteoporosis)    Manage stress better    Reduce your blood pressure    Improve your sense of self and  your body image  Exercise tips      Ease into your routine. Set small goals. Then build on them. If you are not sure what your activity level should be, talk with your healthcare provider first before starting an exercise routine.    Exercise on most days. Aim for a total of 150 minutes (2 hours and 30 minutes) or more of moderate-intensity aerobic activity each week. Or 75 minutes (1 hour and 15 minutes) or more of vigorous-intensity aerobic activity each week. Or try for a combination of both. Moderate activity means that you breathe heavier and your heart rate increases but you can still talk. Think about doing 40 minutes of moderate exercise, 3 to 4 times a week. For best results, activity should last for about 40 minutes to lower blood pressure and cholesterol. It's OK to work up to the 40-minute period over time. Examples of moderate-intensity activity are walking 1 mile in 15 minutes. Or doing 30 to 45 minutes of yard work.    Step up your daily activity level.  Along with your exercise program, try being more active the whole day. Walk instead of drive. Or park further away so that you take more steps each day. Do more household tasks or yard work. You may not be able to meet the advised mount of physical activity. But doing some moderate- or vigorous-intensity aerobic activity can help reduce your risk for heart disease. Your healthcare provider can help you figure out what is best for you.    Choose 1 or more activities you enjoy.  Walking is one of the easiest things you can do. You can also try swimming, riding a bike, dancing, or taking an exercise class.    When to call your healthcare provider  Call your healthcare provider if you have any of these:     Chest pain or feel dizzy or lightheaded    Burning, tightness, pressure, or heaviness in your chest, neck, shoulders, back, or arms    Abnormal shortness of breath    More joint or muscle pain    A very fast or irregular heartbeat  (palpitations)  [employer] last reviewed this educational content on 7/1/2019 2000-2021 [employer]. All rights reserved. This information is not intended as a substitute for professional medical care. Always follow your healthcare professional's instructions.          Understanding USDA MyPlate  The USDA has guidelines to help you make healthy food choices. These are called MyPlate. MyPlate shows the food groups that make up healthy meals using the image of a place setting. Before you eat, think about the healthiest choices for what to put on your plate or in your cup or bowl. To learn more about building a healthy plate, visit www.choosemyplate.gov.    The food groups    Fruits. Any fruit or 100% fruit juice counts as part of the Fruit Group. Fruits may be fresh, canned, frozen, or dried, and may be whole, cut-up, or pureed. Make 1/2 of your plate fruits and vegetables.    Vegetables. Any vegetable or 100% vegetable juice counts as a member of the Vegetable Group. Vegetables may be fresh, frozen, canned, or dried. They can be served raw or cooked and may be whole, cut-up, or mashed. Make 1/2 of your plate fruits and vegetables.    Grains. All foods made from grains are part of the Grains Group. These include wheat, rice, oats, cornmeal, and barley. Grains are often used to make foods such as bread, pasta, oatmeal, cereal, tortillas, and grits. Grains should be no more than 1/4 of your plate. At least half of your grains should be whole grains.    Protein. This group includes meat, poultry, seafood, beans and peas, eggs, processed soy products (such as tofu), nuts (including nut butters), and seeds. Make protein choices no more than 1/4 of your plate. Meat and poultry choices should be lean or low fat.    Dairy. The Dairy Group includes all fluid milk products and foods made from milk that contain calcium, such as yogurt and cheese. (Foods that have little calcium, such as cream, butter, and cream  cheese, are not part of this group.) Most dairy choices should be low-fat or fat-free.    Oils. Oils aren't a food group, but they do contain essential nutrients. However it's important to watch your intake of oils. These are fats that are liquid at room temperature. They include canola, corn, olive, soybean, vegetable, and sunflower oil. Foods that are mainly oil include mayonnaise, certain salad dressings, and soft margarines. You likely already get your daily oil allowance from the foods you eat.  Things to limit  Eating healthy also means limiting these things in your diet:       Salt (sodium). Many processed foods have a lot of sodium. To keep sodium intake down, eat fresh vegetables, meats, poultry, and seafood when possible. Purchase low-sodium, reduced-sodium, or no-salt-added food products at the store. And don't add salt to your meals at home. Instead, season them with herbs and spices such as dill, oregano, cumin, and paprika. Or try adding flavor with lemon or lime zest and juice.    Saturated fat. Saturated fats are most often found in animal products such as beef, pork, and chicken. They are often solid at room temperature, such as butter. To reduce your saturated fat intake, choose leaner cuts of meat and poultry. And try healthier cooking methods such as grilling, broiling, roasting, or baking. For a simple lower-fat swap, use plain nonfat yogurt instead of mayonnaise when making potato salad or macaroni salad.    Added sugars. These are sugars added to foods. They are in foods such as ice cream, candy, soda, fruit drinks, sports drinks, energy drinks, cookies, pastries, jams, and syrups. Cut down on added sugars by sharing sweet treats with a family member or friend. You can also choose fruit for dessert, and drink water or other unsweetened beverages.     [employer] last reviewed this educational content on 6/1/2020 2000-2021 [employer]. All rights reserved. This information is not  intended as a substitute for professional medical care. Always follow your healthcare professional's instructions.          Signs of Hearing Loss      Hearing much better with one ear can be a sign of hearing loss.   Hearing loss is a problem shared by many people. In fact, it is one of the most common health problems, particularly as people age. Most people age 65 and older have some hearing loss. By age 80, almost everyone does. Hearing loss often occurs slowly over the years. So you may not realize your hearing has gotten worse.  Have your hearing checked  Call your healthcare provider if you:    Have to strain to hear normal conversation    Have to watch other people s faces very carefully to follow what they re saying    Need to ask people to repeat what they ve said    Often misunderstand what people are saying    Turn the volume of the television or radio up so high that others complain    Feel that people are mumbling when they re talking to you    Find that the effort to hear leaves you feeling tired and irritated    Notice, when using the phone, that you hear better with one ear than the other  [employer] last reviewed this educational content on 1/1/2020 2000-2021 [employer]. All rights reserved. This information is not intended as a substitute for professional medical care. Always follow your healthcare professional's instructions.          Urinary Incontinence, Female (Adult)   Urinary incontinence means loss of bladder control. This problem affects many women, especially as they get older. If you have incontinence, you may be embarrassed to ask for help. But know that this problem can be treated.   Types of Incontinence  There are different types of incontinence. Two of the main types are described here. You can have more than one type.     Stress incontinence. With this type, urine leaks when pressure (stress) is put on the bladder. This may happen when you cough, sneeze, or laugh.  Stress incontinence most often occurs because the pelvic floor muscles that support the bladder and urethra are weak. This can happen after pregnancy and vaginal childbirth or a hysterectomy. It can also be due to excess body weight or hormone changes.    Urge incontinence (also called overactive bladder). With this type, a sudden urge to urinate is felt often. This may happen even though there may not be much urine in the bladder. The need to urinate often during the night is common. Urge incontinence most often occurs because of bladder spasms. This may be due to bladder irritation or infection. Damage to bladder nerves or pelvic muscles, constipation, and certain medicines can also lead to urge incontinence.  Treatment depends on the cause. Further evaluation is needed to find the type you have. This will likely include an exam and certain tests. Based on the results, you and your healthcare provider can then plan treatment. Until a diagnosis is made, the home care tips below can help ease symptoms.   Home care    Do pelvic floor muscle exercises, if they are prescribed. The pelvic floor muscles help support the bladder and urethra. Many women find that their symptoms improve when doing special exercises that strengthen these muscles. To do the exercises, contract the muscles you would use to stop your stream of urine. But do this when you re not urinating. Hold for 10 seconds, then relax. Repeat 10 to 20 times in a row, at least 3 times a day. Your healthcare provider may give you other instructions for how to do the exercises and how often.    Keep a bladder diary. This helps track how often and how much you urinate over a set period of time. Bring this diary with you to your next visit with the provider. The information can help your provider learn more about your bladder problem.    Lose weight, if advised to by your provider. Extra weight puts pressure on the bladder. Your provider can help you create a  weight-loss plan that s right for you. This may include exercising more and making certain diet changes.    Don't have foods and drinks that may irritate the bladder. These can include alcohol and caffeinated drinks.    Quit smoking. Smoking and other tobacco use can lead to a long-term (chronic) cough that strains the pelvic floor muscles. Smoking may also damage the bladder and urethra. Talk with your provider about treatments or methods you can use to quit smoking.    If drinking large amounts of fluid makes you have symptoms, you may be advised to limit your fluid intake. You may also be advised to drink most of your fluids during the day and to limit fluids at night.    If you re worried about urine leakage or accidents, you may wear absorbent pads to catch urine. Change the pads often. This helps reduce discomfort. It may also reduce the risk of skin or bladder infections.    Follow-up care  Follow up with your healthcare provider, or as directed. It may take some to find the right treatment for your problem. But healthy lifestyle changes can be made right away. These include such things as exercising on a regular basis, eating a healthy diet, losing weight (if needed), and quitting smoking. Your treatment plan may include special therapies or medicines. Certain procedures or surgery may also be options. Talk about any questions you have with your provider.   When to seek medical advice  Call the healthcare provider right away if any of these occur:    Fever of 100.4 F (38 C) or higher, or as directed by your provider    Bladder pain or fullness    Belly swelling    Nausea or vomiting    Back pain    Weakness, dizziness, or fainting  [employer] last reviewed this educational content on 1/1/2020 2000-2021 [employer]. All rights reserved. This information is not intended as a substitute for professional medical care. Always follow your healthcare professional's instructions.          Preventing  Falls at Home  A person can fall for many reasons. Older adults may fall because reaction time slows down as we age. Your muscles and joints may get stiff, weak, or less flexible because of illness, medicines, or a physical condition.   Other health problems that make falls more likely include:     Arthritis    Dizziness or lightheadedness when you stand up (orthostatic hypotension)    History of a stroke    Dizziness    Anemia    Certain medicines taken for mental illness or to control blood pressure.    Problems with balance or gait    Bladder or urinary problems    History of falling    Changes in vision (vision impairment)    Changes in thinking skills and memory (cognitive impairment)  Injuries from a fall can include serious injuries such as broken bones, dislocated joints, internal bleeding and cuts. Injuries like these can limit your independence.   Prevention tips  To help prevent falls and fall-related injuries, follow the tips below.    Floors  To make floors safer:     Put nonskid pads under area rugs.    Remove small rugs.    Replace worn floor coverings.    Tack carpets firmly to each step on carpeted stairs. Put nonskid strips on the edges of uncarpeted stairs.    Keep floors and stairs free of clutter and cords.    Arrange furniture so there are clear pathways.    Clean up any spills right away.  Bathrooms    To make bathrooms safer:     Install grab bars in the tub or shower.    Apply nonskid strips or put a nonskid rubber mat in the tub or shower.    Sit on a bath chair to bathe.    Use bathmats with nonskid backing.  Lighting  To improve visibility in your home:      Keep a flashlight in each room. Or put a lamp next to the bed within easy reach.    Put nightlights in the bedrooms, hallways, kitchen, and bathrooms.    Make sure all stairways have good lighting.    Take your time when going up and down stairs.    Put handrails on both sides of stairs and in walkways for more support. To prevent  injury to your wrist or arm, don t use handrails to pull yourself up.    Install grab bars to pull yourself up.    Move or rearrange items that you use often. This will make them easier to find or reach.    Look at your home to find any safety hazards. Especially look at doorways, walkways, and the driveway. Remove or repair any safety problems that you find.  Other changes to make    Look around to find any safety hazards. Look closely at doorways, walkways, and the driveway. Remove or repair any safety problems that you find.    Wear shoes that fit well.    Take your time when going up and down stairs.    Put handrails on both sides of stairs and in walkways for more support. To prevent injury to your wrist or arm, don t use handrails to pull yourself up.    Install grab bars wherever needed to pull yourself up.    Arrange items that you use often. This will make them easier to find or reach.    [employer] last reviewed this educational content on 3/1/2020    3894-5599 [employer]. All rights reserved. This information is not intended as a substitute for professional medical care. Always follow your healthcare professional's instructions.

## 2022-11-21 ENCOUNTER — LAB (OUTPATIENT)
Dept: LAB | Facility: CLINIC | Age: 72
End: 2022-11-21
Attending: FAMILY MEDICINE
Payer: COMMERCIAL

## 2022-11-21 DIAGNOSIS — H26.9 CATARACT OF RIGHT EYE, UNSPECIFIED CATARACT TYPE: ICD-10-CM

## 2022-11-21 LAB — SARS-COV-2 RNA RESP QL NAA+PROBE: NEGATIVE

## 2022-11-21 PROCEDURE — U0005 INFEC AGEN DETEC AMPLI PROBE: HCPCS

## 2022-11-21 PROCEDURE — U0003 INFECTIOUS AGENT DETECTION BY NUCLEIC ACID (DNA OR RNA); SEVERE ACUTE RESPIRATORY SYNDROME CORONAVIRUS 2 (SARS-COV-2) (CORONAVIRUS DISEASE [COVID-19]), AMPLIFIED PROBE TECHNIQUE, MAKING USE OF HIGH THROUGHPUT TECHNOLOGIES AS DESCRIBED BY CMS-2020-01-R: HCPCS

## 2022-11-22 ENCOUNTER — TELEPHONE (OUTPATIENT)
Dept: FAMILY MEDICINE | Facility: CLINIC | Age: 72
End: 2022-11-22

## 2022-11-22 NOTE — RESULT ENCOUNTER NOTE
Patient updated by [employer] message with lab results.       Serina Macias,  Please print a copy of your negative covid test for your surgical team, or bring a device with access to [employer] to ensure they have these negative results.  Susan Maddox, DO

## 2022-11-22 NOTE — TELEPHONE ENCOUNTER
"\"Please fax copy of her negative covid test to her eye surgeon.\"-Dr. Maddox        Faxed copy of negative covid test to Dr. Ruby Sr at [phone]  "

## 2022-11-23 DIAGNOSIS — R19.5 POSITIVE COLORECTAL CANCER SCREENING USING COLOGUARD TEST: Primary | ICD-10-CM

## 2022-11-23 LAB — NONINV COLON CA DNA+OCC BLD SCRN STL QL: POSITIVE

## 2022-11-23 NOTE — RESULT ENCOUNTER NOTE
Patient updated by [employer] message with lab results.       Serina Macias,  Your cologuard test has returned and has returned positive. With a positive cologuard test, we recommend proceeding with a screening colonoscopy to further investigate why this test has returned positive. I have placed an order for a colonoscopy with [facility]. Scheduling should be reaching out to help coordinate the referral. Please reach out with questions or concerns.  Susan Maddox, DO

## 2022-11-23 NOTE — PROGRESS NOTES
1. Positive colorectal cancer screening using Cologuard test  - Colonoscopy Screening  Referral; Future     Susan Maddox, DO

## 2023-01-03 ENCOUNTER — OFFICE VISIT (OUTPATIENT)
Dept: SLEEP MEDICINE | Facility: CLINIC | Age: 73
End: 2023-01-03
Payer: COMMERCIAL

## 2023-01-03 VITALS
SYSTOLIC BLOOD PRESSURE: 118 MMHG | HEIGHT: 61 IN | BODY MASS INDEX: 48.11 KG/M2 | WEIGHT: 254.8 LBS | OXYGEN SATURATION: 95 % | DIASTOLIC BLOOD PRESSURE: 75 MMHG | HEART RATE: 79 BPM

## 2023-01-03 DIAGNOSIS — G47.33 OBSTRUCTIVE SLEEP APNEA: Primary | ICD-10-CM

## 2023-01-03 PROCEDURE — 99203 OFFICE O/P NEW LOW 30 MIN: CPT | Performed by: INTERNAL MEDICINE

## 2023-01-03 NOTE — PROGRESS NOTES
Additional 15 minutes on the date of service was spent performing the following:    -Preparing to see the patient  -Obtaining and/or reviewing separately obtained history   -Ordering medications, tests, or procedures   -Documenting clinical information in the electronic or other health record     Thank you for the opportunity to participate in the care of Colleen Samaniego.     She is a 72 year old y/o female patient who comes to the sleep medicine clinic for follow up.  The patient was diagnosed with mild obstructive sleep apnea on 10/2/2012 here in our facility with an apnea hypopnea index of 10.5 events per hour.    The patient states that she continues to do well on CPAP therapy and still getting benefit from it.  She would like to reestablish care so that she can continue to get supplies for her CPAP machine.     Assessment and Plan:  In summary Colleen Samaniego is a 72 year old year old female who is here for follow up.    1. Obstructive sleep apnea  I congratulated the patient on excellent CPAP usage.  I will keep her on same pressure settings for now.  Recommend the patient follow-up with me annually.  - COMPREHENSIVE DME    Compliance Download data for 30 days:  Pressure setting: CPAP 7 CWP  Residual AHI: 0.3 events per hour  Leak: Minimal  Compliance: 100%  Mask Tolerance: Good  Skin irritation: None  DME:  [employer] [place]    Lab reviewed: Discussed with patient.    Sleep-Wake Cycle:    The patient likes to initiate sleep at around 9 PM with a sleep latency of 15-20 minutes. The patient has 1 nocturnal awakenings. Final wake up time is around 3:30 AM.    JOSH:  JOSH Total Score: 10  Total score - [place]: 4 (1/3/2023 12:44 PM)        Patient Active Problem List   Diagnosis     History of rheumatic fever     Obstructive sleep apnea     Feelings Of Urinary Urgency     S/P knee replacement     Coronary artery disease due to calcified coronary lesion     History of pulmonary embolism     S/P CABG x 3     History of  DVT (deep vein thrombosis)     GERD (gastroesophageal reflux disease)     Exertional angina (H)     Hypercholesteremia     Age-related osteoporosis without current pathological fracture     Morbid obesity (H)     Dyspnea on exertion       Past Medical History:   Diagnosis Date     Acute blood loss anemia 2015     Acute pulmonary embolism (H) 2015     Acute respiratory failure with hypoxia (H) 2015     Arthritis      Coronary artery disease      DVT (deep venous thrombosis) (H)      Family history of myocardial infarction      GERD (gastroesophageal reflux disease)      High cholesterol      History of anesthesia complications     slow to wake up     History of blood clots      Hypercholesterolemia      Left leg DVT (H) 2015    Postop Left TKA     PE (pulmonary thromboembolism) (H)      Pulmonary embolism, bilateral (H) 2015    Postop Left TKA     Rheumatic fever          Sleep apnea, obstructive     uses CPAP     Thrombocytopenia (H) 2016       Past Surgical History:   Procedure Laterality Date     APPENDECTOMY       BREAST CYST ASPIRATION       BYPASS GRAFT ARTERY CORONARY N/A 2016    Procedure: CORONARY ARTERY BYPASS GRAFT X 3, ENDOSCOPIC VEIN HARVEST, INTERNAL MAMMARY ARTERY, ANESTHESIA TRANSESOPHAGEAL ECHOCARDIOGRAM ;  Surgeon: Rancho Rodas MD;  Location: [facility] OR;  Service:      CARDIAC CATHETERIZATION      prior to CABG     CARDIAC CATHETERIZATION  2017    no intervention      SECTION       x2     HYSTERECTOMY       JOINT REPLACEMENT      left knee     OOPHORECTOMY       SD CATH PLACEMENT & NJX CORONARY ART ANGIO IMG S&I N/A 2017    Procedure: Coronary Angiogram;  Surgeon: David Youngblood MD;  Location: [facility] Cath Lab;  Service: Cardiology     SD CATH PLMT L HRT & ARTS W/NJX & ANGIO IMG S&I Left 2017    Procedure: Left Heart Catheterization Without Left Ventriculogram;  Surgeon: David Youngblood MD;  Location:  [facility] Cath Lab;  Service: Cardiology     WRIST SURGERY Right      [facility] TOTAL ABDOM HYSTERECTOMY      Description: Total Abdominal Hysterectomy;  Recorded: 11/01/2010;     ZZC TOTAL KNEE ARTHROPLASTY Left 9/8/2015    Procedure: LEFT TOTAL KNEE ARTHROPLASTY;  Surgeon: Good Suggs MD;  Location: [facility];  Service: Orthopedics     [facility] TOTAL KNEE ARTHROPLASTY Right 5/8/2018    Procedure:  RIGHT TOTAL KNEE ARTHROPLASTY COMPLEX BMI 46;  Surgeon: Good Suggs MD;  Location: [facility];  Service: Orthopedics       Current Outpatient Medications   Medication Sig Dispense Refill     alendronate (FOSAMAX) 70 MG tablet TAKE 1 TABLET ONCE EVERY 7 DAYS IN THE A.M. ON EMPTY STOMACH WITH FULL GLASS WATER 30MIN BEFORE FOOD 12 tablet 3     aspirin 81 MG EC tablet [ASPIRIN 81 MG EC TABLET] Take 81 mg by mouth daily.       calcium carbonate (OS-ALECIA) 600 mg (1,500 mg) tablet [CALCIUM CARBONATE (OS-ALECIA) 600 MG (1,500 MG) TABLET] Take 600 mg by mouth daily with lunch.        cholecalciferol, vitamin D3, 1,000 unit (25 mcg) tablet Take 1,000 Units by mouth daily        ezetimibe (ZETIA) 10 MG tablet Take 1 tablet (10 mg) by mouth daily 90 tablet 3     famotidine (PEPCID) 40 MG tablet Take 1 tablet (40 mg) by mouth daily 90 tablet 3     metoprolol succinate ER (TOPROL XL) 50 MG 24 hr tablet Take 1 tablet (50 mg) by mouth daily 90 tablet 3     multivitamin with minerals (THERA-M) 9 mg iron-400 mcg Tab tablet [MULTIVITAMIN WITH MINERALS (THERA-M) 9 MG IRON-400 MCG TAB TABLET] Take 1 tablet by mouth daily.       rosuvastatin (CRESTOR) 40 MG tablet Take 1 tablet (40 mg) by mouth daily 90 tablet 3       Allergies   Allergen Reactions     Aleve [Naproxen] Unknown     Causes acid reflux     Amoxicillin Hives     Atorvastatin Unknown     Annotation: myalgia       Doxycycline Hyclate [Doxycycline] Nausea and Diarrhea     Ibuprofen Swelling     Face swelled and became red/ blotchy.  Can take aspirin without reaction      "Simvastatin Unknown     Didn't work for her.  Did not lower cholesterol much       Physical Exam:  /75   Pulse 79   Ht 1.543 m (5' 0.75\")   Wt 115.6 kg (254 lb 12.8 oz)   LMP  (LMP Unknown)   SpO2 95%   BMI 48.54 kg/m    BMI:Body mass index is 48.54 kg/m .   GEN: NAD,   Head: Normocephalic.  EYES: PERRLA, EOMI  ENT: Oropharynx is clear, Thompson class 4+ airway.   Nasal mucosa is moist without erythema  Neck : Thyroid is within normal limits.   CV: Regular rate and rhythm, S1 & S2 positive.  LUNGS: Bilateral breathsounds heard.   ABDOMEN: Positive bowel sounds in all quadrants, soft, no rebound or guarding  MUSCULOSKELETAL: Bilateral trace leg swelling  SKIN: warm, dry, no rashes  Neurological: Alert, oriented to time, place, and person. Gait is normal. Strength 5/5 in all extremities.  Psych: normal mood, normal affect    Labs/Studies:      No results found for: PH, PHARTERIAL, PO2, UM5NLXLXIPE, SAT, PCO2, HCO3, BASEEXCESS, ASYA, BEB  Lab Results   Component Value Date    TSH 1.42 08/18/2020    TSH 1.89 05/01/2019     Lab Results   Component Value Date     (H) 11/01/2022    GLC 88 09/08/2021     Lab Results   Component Value Date    HGB 13.0 09/08/2021    HGB 12.4 08/18/2020     Lab Results   Component Value Date    BUN 21.4 11/01/2022    BUN 19 09/08/2021    CR 1.00 (H) 11/01/2022    CR 1.00 09/08/2021     Lab Results   Component Value Date    AST 28 11/01/2022    AST 22 09/08/2021    ALT 25 11/01/2022    ALT 21 09/08/2021    ALKPHOS 82 11/01/2022    ALKPHOS 73 09/08/2021    BILITOTAL 1.0 11/01/2022    BILITOTAL 1.4 (H) 09/08/2021     No results found for: UAMP, UBARB, BENZODIAZEUR, UCANN, UCOC, OPIT, UPCP    Recent Labs   Lab Test 11/01/22  1043 09/08/21  1136    143   POTASSIUM 4.9 4.6   CHLORIDE 103 108*   CO2 26 24   ANIONGAP 12 11   * 88   BUN 21.4 19   CR 1.00* 1.00   ALECIA 9.6 9.8       No results found for: KEVIN    I reviewed the efficacy and compliance report from her device. " Data summarized on the HPI and the PAP compliance flow sheet.     Patient verbalized understanding of these issues, agrees with the plan and all questions were answered today. Patient was given an opportuntity to voice any other symptoms or concerns not listed above. Patient did not have any other symptoms or concerns.      Link Torres DO  Board Certified in Internal Medicine and Sleep Medicine    (Note created with Dragon voice recognition and unintended spelling errors and word substitutions may occur)     Audio and visual devices were used for this virtual clinic visit with permission from patient.

## 2023-01-03 NOTE — NURSING NOTE
"Chief Complaint   Patient presents with     Sleep Apnea       Initial /75   Pulse 79   Ht 1.543 m (5' 0.75\")   Wt 115.6 kg (254 lb 12.8 oz)   LMP  (LMP Unknown)   SpO2 95%   BMI 48.54 kg/m   Estimated body mass index is 48.54 kg/m  as calculated from the following:    Height as of this encounter: 1.543 m (5' 0.75\").    Weight as of this encounter: 115.6 kg (254 lb 12.8 oz).    Medication Reconciliation: complete    Neck circumference: n/a    DME: ERNIE     1 year follow up appointment scheduled.    Annette Echavarria MA    "

## 2023-02-09 ENCOUNTER — TRANSFERRED RECORDS (OUTPATIENT)
Dept: HEALTH INFORMATION MANAGEMENT | Facility: CLINIC | Age: 73
End: 2023-02-09
Payer: COMMERCIAL

## 2023-02-13 ENCOUNTER — HOSPITAL ENCOUNTER (EMERGENCY)
Facility: CLINIC | Age: 73
Discharge: HOME | End: 2023-02-13
Attending: PHYSICIAN ASSISTANT | Admitting: PHYSICIAN ASSISTANT
Payer: COMMERCIAL

## 2023-02-13 ENCOUNTER — NURSE TRIAGE (OUTPATIENT)
Dept: NURSING | Facility: CLINIC | Age: 73
End: 2023-02-13
Payer: COMMERCIAL

## 2023-02-13 VITALS
SYSTOLIC BLOOD PRESSURE: 154 MMHG | RESPIRATION RATE: 20 BRPM | HEART RATE: 58 BPM | TEMPERATURE: 98.3 F | DIASTOLIC BLOOD PRESSURE: 56 MMHG | OXYGEN SATURATION: 96 %

## 2023-02-13 DIAGNOSIS — T16.2XXA ACUTE FOREIGN BODY OF LEFT EAR, INITIAL ENCOUNTER: ICD-10-CM

## 2023-02-13 PROCEDURE — G0463 HOSPITAL OUTPT CLINIC VISIT: HCPCS | Mod: 25 | Performed by: PHYSICIAN ASSISTANT

## 2023-02-13 PROCEDURE — 69200 CLEAR OUTER EAR CANAL: CPT | Mod: LT | Performed by: PHYSICIAN ASSISTANT

## 2023-02-13 PROCEDURE — 99213 OFFICE O/P EST LOW 20 MIN: CPT | Mod: 25 | Performed by: PHYSICIAN ASSISTANT

## 2023-02-13 RX ORDER — OFLOXACIN 3 MG/ML
4 SOLUTION/ DROPS OPHTHALMIC 2 TIMES DAILY
Qty: 3 ML | Refills: 0 | Status: SHIPPED | OUTPATIENT
Start: 2023-02-13 | End: 2023-02-20

## 2023-02-13 ASSESSMENT — ACTIVITIES OF DAILY LIVING (ADL): ADLS_ACUITY_SCORE: 35

## 2023-02-13 NOTE — ED PROVIDER NOTES
History     Chief Complaint   Patient presents with     Foreign Body in Ear     Otalgia     HPI  Colleen Samaniego is a 73 year old female who presents to urgent care with concern over left ear pain and some present for the last 4 to 5 days.  Patient reports concern that she could have a piece of her hearing aid stuck inside of her ear as she noted that the rubber tip of hearing aid was missing.  She has had some hearing changes however notes that she has not been wearing her hearing aid in her left ear because she cannot find the appropriate pieces.  No discharge or drainage from the ear.  She has not had any recent fever, chills, nausea, cough, dyspnea, wheezing.      Allergies:  Allergies   Allergen Reactions     Aleve [Naproxen] Unknown     Causes acid reflux     Amoxicillin Hives     Atorvastatin Unknown     Annotation: myalgia       Doxycycline Hyclate [Doxycycline] Nausea and Diarrhea     Ibuprofen Swelling     Face swelled and became red/ blotchy.  Can take aspirin without reaction     Simvastatin Unknown     Didn't work for her.  Did not lower cholesterol much       Problem List:    Patient Active Problem List    Diagnosis Date Noted     Dyspnea on exertion 10/07/2019     Priority: Medium     Morbid obesity (H) 07/17/2019     Priority: Medium     Age-related osteoporosis without current pathological fracture 05/21/2019     Priority: Medium     Starting Fosamax 05/29/19          History of rheumatic fever      Priority: Medium     Hospitalized in 1953         Hypercholesteremia      Priority: Medium     Obstructive sleep apnea      Priority: Medium     Created by Conversion         History of pulmonary embolism      Priority: Medium     S/P CABG x 3      Priority: Medium     Exertional angina (H) 05/02/2017     Priority: Medium     GERD (gastroesophageal reflux disease) 05/10/2016     Priority: Medium     History of DVT (deep vein thrombosis) 05/05/2016     Priority: Medium     Coronary artery disease due to  calcified coronary lesion 04/15/2016     Priority: Medium     S/P knee replacement 2015     Priority: Medium     Feelings Of Urinary Urgency      Priority: Medium     Created by Conversion            Past Medical History:    Past Medical History:   Diagnosis Date     Acute blood loss anemia 2015     Acute pulmonary embolism (H) 2015     Acute respiratory failure with hypoxia (H) 2015     Arthritis      Coronary artery disease      DVT (deep venous thrombosis) (H)      Family history of myocardial infarction      GERD (gastroesophageal reflux disease)      High cholesterol      History of anesthesia complications      History of blood clots      Hypercholesterolemia      Left leg DVT (H) 2015     PE (pulmonary thromboembolism) (H)      Pulmonary embolism, bilateral (H) 2015     Rheumatic fever      Sleep apnea, obstructive      Thrombocytopenia (H) 2016       Past Surgical History:    Past Surgical History:   Procedure Laterality Date     APPENDECTOMY       BREAST CYST ASPIRATION       BYPASS GRAFT ARTERY CORONARY N/A 2016    Procedure: CORONARY ARTERY BYPASS GRAFT X 3, ENDOSCOPIC VEIN HARVEST, INTERNAL MAMMARY ARTERY, ANESTHESIA TRANSESOPHAGEAL ECHOCARDIOGRAM ;  Surgeon: Rancho Rodas MD;  Location: [facility] OR;  Service:      CARDIAC CATHETERIZATION      prior to CABG     CARDIAC CATHETERIZATION  2017    no intervention      SECTION       x2     HYSTERECTOMY       JOINT REPLACEMENT      left knee     OOPHORECTOMY       HI CATH PLACEMENT & NJX CORONARY ART ANGIO IMG S&I N/A 2017    Procedure: Coronary Angiogram;  Surgeon: David Youngblood MD;  Location: [facility] Cath Lab;  Service: Cardiology     HI CATH PLMT L HRT & ARTS W/NJX & ANGIO IMG S&I Left 2017    Procedure: Left Heart Catheterization Without Left Ventriculogram;  Surgeon: David Youngblood MD;  Location: [facility] Cath Lab;  Service: Cardiology     WRIST SURGERY  Right      [facility] TOTAL ABDOM HYSTERECTOMY      Description: Total Abdominal Hysterectomy;  Recorded: 2010;     ZZC TOTAL KNEE ARTHROPLASTY Left 2015    Procedure: LEFT TOTAL KNEE ARTHROPLASTY;  Surgeon: Good Suggs MD;  Location: [facility];  Service: Orthopedics     [facility] TOTAL KNEE ARTHROPLASTY Right 2018    Procedure:  RIGHT TOTAL KNEE ARTHROPLASTY COMPLEX BMI 46;  Surgeon: Good Suggs MD;  Location: [facility];  Service: Orthopedics       Family History:    Family History   Problem Relation Age of Onset     Hypertension Mother      Heart Disease Mother      Heart Disease Father      Snoring Father      Cerebrovascular Disease Sister      Heart Disease Sister      Clotting Disorder Sister      Factor V Leiden deficiency Sister      Clotting Disorder Brother      Factor V Leiden deficiency Brother      Anesthesia Reaction No family hx of      Breast Cancer No family hx of        Social History:  Marital Status:   [4]  Social History     Tobacco Use     Smoking status: Former     Types: Cigarettes     Quit date: 2001     Years since quittin.2     Smokeless tobacco: Never   Vaping Use     Vaping Use: Never used   Substance Use Topics     Alcohol use: No     Drug use: No        Medications:    ofloxacin (OCUFLOX) 0.3 % ophthalmic solution  alendronate (FOSAMAX) 70 MG tablet  aspirin 81 MG EC tablet  calcium carbonate (OS-ALECIA) 600 mg (1,500 mg) tablet  cholecalciferol, vitamin D3, 1,000 unit (25 mcg) tablet  ezetimibe (ZETIA) 10 MG tablet  famotidine (PEPCID) 40 MG tablet  metoprolol succinate ER (TOPROL XL) 50 MG 24 hr tablet  multivitamin with minerals (THERA-M) 9 mg iron-400 mcg Tab tablet  rosuvastatin (CRESTOR) 40 MG tablet      Review of Systems  CONSTITUTIONAL:NEGATIVE for fever, chills, change in weight  INTEGUMENTARY/SKIN: NEGATIVE for worrisome rashes, moles or lesions  EYES: NEGATIVE for vision changes or irritation  ENT/MOUTH: POSITIVE for right ear pain  NEGATIVE for sore throat, nasal congestion   RESP:NEGATIVE for significant cough or SOB  GI: NEGATIVE for nausea, vomiting, diarrhea, abdominal pain   Physical Exam   BP: (!) 154/56  Pulse: 58  Temp: 98.3  F (36.8  C)  Resp: 20  SpO2: 96 %  Physical Exam  The right TM is normal: no effusions, no erythema, and normal landmarks     The right auditory canal is normal and without drainage, edema or erythema  The left TM is initially obstructed by foreign body and cerumen,  The left auditory canal is initially obstructed by rubbery foreign body,  once it was removed there was some bleeding of the posterior wall of the canal proximal to the foreign body   Oropharynx exam is normal: no lesions, erythema, adenopathy or exudate.  GENERAL: no acute distress  EYES: EOMI,  PERRL, conjunctiva clear  NECK: supple, non-tender to palpation, no adenopathy noted  RESP: lungs clear to auscultation - no rales, rhonchi or wheezes  CV: regular rates and rhythm, normal S1 S2, no murmur noted  SKIN: no suspicious lesions or rashes   ED Course           Procedures       Critical Care time:  none        No results found for this or any previous visit (from the past 24 hour(s)).  Medications - No data to display    After discussing her/benefits foreign body was removed with forceps.  Patient tolerated with minimal expected discomfort     Assessments & Plan (with Medical Decision Making)     I have reviewed the nursing notes.  I have reviewed the findings, diagnosis, plan and need for follow up with the patient.       Medical Decision Making  The patient's presentation is strongly suggestive of a clearly self-limited or minor problem.    The patient's evaluation involved:  history and exam without other MDM data elements    The patient's management involved prescription drug management (including medications given in the ED).      New Prescriptions    OFLOXACIN (OCUFLOX) 0.3 % OPHTHALMIC SOLUTION    Place 4 drops Into the left ear 2 times  daily for 7 days       Final diagnoses:   Acute foreign body of left ear, initial encounter     73-year-old female presents to the urgent care with concern over left ear pain, foreign body sensation after repeat severe hearing aid was noted to be missing.  She had elevated blood pressure upon arrival, remainder vital signs stable.  Physical exam findings were significant for foreign body in her ear canal which was removed and noted to have some bleeding to the posterior wall of the canal proximal to the foreign body.  She was discharged home stable with prescription for ofloxacin drops to prevent secondary otitis externa.  Follow-up as needed if new or worsening symptoms develop.    Disclaimer: This note consists of symbols derived from keyboarding, dictation, and/or voice recognition software. As a result, there may be errors in the script that have gone undetected.  Please consider this when interpreting information found in the chart.    2/13/2023   [facility] EMERGENCY DEPT    Annette Mccabe PA-C  02/13/23 [id number]

## 2023-02-13 NOTE — TELEPHONE ENCOUNTER
"Pt reports \"Pain in L ear.\"  Onset 2/9/23 (four days ago).  Worsening daily.  Suspects possibility of foreign body (tiny soft piece from hearing aid).    \"Took aids out before showering on Feb 9th.\"  \"Didn't pay attention before putting them back in my ear.\"  \"Later noticed one of the pieces was missing from one aid.\"  Missing piece is \"the size of a tiny cone with a stem.\"  Piece is \"soft.\"  \"May have pushed it further into the ear while scrubbing in the shower.\"  \"Even tried Debrox drops.\"  Ear lobe is tender to move or touch.  \"Hurts to sleep on L ear.\"    Advised same-day clinical eval to rule out foreign body.  Pt agrees to plan.  No open appt slots at feasibly located [facility].  Pt therefore agrees to go to Urgent Care.  States intention to go to [facility].  Location and hours confirmed.    Racheal [facility] Nurse Advisor     Reason for Disposition    Moving the earlobe or touching the ear clearly increases the pain    Caller is unable to remove the foreign body    Additional Information    Negative: Sounds like a life-threatening emergency to the triager    Negative: Symptoms to not match Swimmer's Ear    Negative: Pink or red swelling behind the ear    Negative: Stiff neck (can't touch chin to chest)    Negative: Patient sounds very sick or weak to the triager    Negative: Diabetes mellitus or a weak immune system (e.g., HIV positive, cancer chemotherapy, transplant patient)    Negative: Redness or swelling of outer ear (ear lobe)    Negative: Fever > 100.4 F (38.0 C)    Negative: MODERATE-SEVERE ear pain    Negative: Button battery    Negative: Sharp foreign body (e.g., glass, pin)    Negative: Bleeding from ear canal    Negative: Caller is unable to remove live insect    Protocols used: EARACHE-A-OH, EAR - FOREIGN BODY-A-OH, EAR - SWIMMER'S-A-OH      "

## 2023-02-13 NOTE — ED TRIAGE NOTES
Pt reports left ear pain since 2/9/23. PT states that she thinks there maybe a foreign object in her ear, she states she wears hearing aids.

## 2023-09-26 DIAGNOSIS — M81.0 AGE-RELATED OSTEOPOROSIS WITHOUT CURRENT PATHOLOGICAL FRACTURE: ICD-10-CM

## 2023-09-27 RX ORDER — ALENDRONATE SODIUM 70 MG/1
TABLET ORAL
Qty: 12 TABLET | Refills: 0 | Status: SHIPPED | OUTPATIENT
Start: 2023-09-27 | End: 2023-11-24

## 2023-09-27 NOTE — TELEPHONE ENCOUNTER
Bridging refill sent to pharmacy. Will address prescriptions at her appointment.  Susan Maddox, DO

## 2023-11-03 DIAGNOSIS — E78.00 HYPERCHOLESTEREMIA: ICD-10-CM

## 2023-11-03 DIAGNOSIS — I25.10 CORONARY ARTERY DISEASE DUE TO CALCIFIED CORONARY LESION: ICD-10-CM

## 2023-11-03 DIAGNOSIS — I25.84 CORONARY ARTERY DISEASE DUE TO CALCIFIED CORONARY LESION: ICD-10-CM

## 2023-11-03 RX ORDER — ROSUVASTATIN CALCIUM 40 MG/1
40 TABLET, COATED ORAL DAILY
Qty: 90 TABLET | Refills: 0 | Status: SHIPPED | OUTPATIENT
Start: 2023-11-03 | End: 2023-11-24

## 2023-11-03 RX ORDER — METOPROLOL SUCCINATE 50 MG/1
50 TABLET, EXTENDED RELEASE ORAL DAILY
Qty: 90 TABLET | Refills: 0 | Status: SHIPPED | OUTPATIENT
Start: 2023-11-03 | End: 2023-11-24

## 2023-11-03 NOTE — TELEPHONE ENCOUNTER
CHIEF COMPLAINT:  Derm Problem (reoccuring rash to right foot,right hand and left hand)       HISTORY OF PRESENT ILLNESS:  Darya Connolly is a 44 year old female who presented requesting evaluation for a reoccurring rash on right foot, right hand and left hand. Patient c/o itching,peeling and burning to affected areas.  No help antifungal meds feet, OTEZLAin the past used but felt was not able to use for a long enough period of time due to stomach problems.. Patient afraid to use the Soriatane due to potential liver issues oral antibiotics help but cannot be use for a long period of time.        PAST HISTORIES:  Dermatologic History: History of eczema and psoriasis.  Personal history of skin cancer:No  Family history of skin cancer: No  History of sunburns/tanning bed use:  sunburns as a child/teenager   Sunscreen use: often    Review of systems:  No fevers or chills  No swollen glands  No recent weight changes  No cardiac or hematological abnormality that would prevent a biopsy or procedure    Physical Exam:  General:  Well nourished, well developed, in no acute distress  Skin: Erythematous scaly plaques with peeling and erosions hands feet elbows and knees scalp with irritation and erythema and scaly plaques over 30% involved severe involvement hands and feet  Head:  Normocephalic-atraumatic.  Neurologic:  Oriented x 4.  No focal deficits.    Total skin exam: Areas checked: Head (including face and scalp), eyelids, lips, neck, upper extremities, lower extremities, buttocks, chest, back, abdomen, and scalp/body hair.  or pathological evaluation.    Procedure  From the foot and aerobic culture was taken      ASSESSMENT/PLAN:  1. Psoriasis    Diagnoses and all orders for this visit:  Impetigo  -     AEROBIC CULTURE AND SMEAR  PPP (palmoplantar pustulosis)  -     halobetasol (ULTRAVATE) 0.05 % ointment; Apply topically 2 times daily. Hands and feet  And sit or hand-foot light salomon but given difficulty with  Scheduled for visit end of month.  Refill sent to pharmacy.  Will address additional refills at her appointment.  Susan Maddox, DO    multiple treatments during the day getting away from work likely unable to do unable to tolerate Soriatane seemed to avoid biologic agents at this point would like to start with OTEZLA  Is to have neuralgia does not wish to take any biologic agents especially if potential Calvert for neurologic side effects    On 7/10/2019, IHannah LPN scribed the services personally performed by Joanna Francisco MD     I, Dr Francisco, attest that the documentation recorded by the scribe accurately and completely reflects the service(s) I personally performed and the decisions made by me.

## 2023-11-20 ASSESSMENT — ENCOUNTER SYMPTOMS
COUGH: 0
SHORTNESS OF BREATH: 1
WEAKNESS: 1
JOINT SWELLING: 1
BREAST MASS: 0
FEVER: 0
PARESTHESIAS: 0
PALPITATIONS: 1
DIARRHEA: 0
HEMATURIA: 0
NAUSEA: 0
DYSURIA: 0
HEARTBURN: 0
EYE PAIN: 0
SORE THROAT: 0
CONSTIPATION: 0
FREQUENCY: 0
HEADACHES: 0
ABDOMINAL PAIN: 0
HEMATOCHEZIA: 0
MYALGIAS: 1
CHILLS: 0
NERVOUS/ANXIOUS: 0
DIZZINESS: 0
ARTHRALGIAS: 1

## 2023-11-20 ASSESSMENT — ACTIVITIES OF DAILY LIVING (ADL): CURRENT_FUNCTION: NO ASSISTANCE NEEDED

## 2023-11-24 ENCOUNTER — OFFICE VISIT (OUTPATIENT)
Dept: FAMILY MEDICINE | Facility: CLINIC | Age: 73
End: 2023-11-24
Payer: COMMERCIAL

## 2023-11-24 VITALS
BODY MASS INDEX: 48.8 KG/M2 | DIASTOLIC BLOOD PRESSURE: 66 MMHG | SYSTOLIC BLOOD PRESSURE: 133 MMHG | OXYGEN SATURATION: 94 % | HEIGHT: 61 IN | HEART RATE: 66 BPM | WEIGHT: 258.5 LBS | TEMPERATURE: 98.4 F | RESPIRATION RATE: 16 BRPM

## 2023-11-24 DIAGNOSIS — I25.10 CORONARY ARTERY DISEASE DUE TO CALCIFIED CORONARY LESION: ICD-10-CM

## 2023-11-24 DIAGNOSIS — E78.00 HYPERCHOLESTEREMIA: ICD-10-CM

## 2023-11-24 DIAGNOSIS — I20.89 STABLE ANGINA (H): ICD-10-CM

## 2023-11-24 DIAGNOSIS — R73.03 PREDIABETES: ICD-10-CM

## 2023-11-24 DIAGNOSIS — M81.0 AGE-RELATED OSTEOPOROSIS WITHOUT CURRENT PATHOLOGICAL FRACTURE: ICD-10-CM

## 2023-11-24 DIAGNOSIS — I25.84 CORONARY ARTERY DISEASE DUE TO CALCIFIED CORONARY LESION: ICD-10-CM

## 2023-11-24 DIAGNOSIS — Z00.00 ENCOUNTER FOR ANNUAL WELLNESS EXAM IN MEDICARE PATIENT: Primary | ICD-10-CM

## 2023-11-24 DIAGNOSIS — E66.01 MORBID OBESITY (H): ICD-10-CM

## 2023-11-24 DIAGNOSIS — M25.572 PAIN IN JOINT, ANKLE AND FOOT, LEFT: ICD-10-CM

## 2023-11-24 DIAGNOSIS — Z12.31 ENCOUNTER FOR SCREENING MAMMOGRAM FOR BREAST CANCER: ICD-10-CM

## 2023-11-24 LAB
ALBUMIN SERPL BCG-MCNC: 4.3 G/DL (ref 3.5–5.2)
ALP SERPL-CCNC: 75 U/L (ref 40–150)
ALT SERPL W P-5'-P-CCNC: 34 U/L (ref 0–50)
ANION GAP SERPL CALCULATED.3IONS-SCNC: 9 MMOL/L (ref 7–15)
AST SERPL W P-5'-P-CCNC: 35 U/L (ref 0–45)
BILIRUB SERPL-MCNC: 1.2 MG/DL
BUN SERPL-MCNC: 25.2 MG/DL (ref 8–23)
CALCIUM SERPL-MCNC: 9.5 MG/DL (ref 8.8–10.2)
CHLORIDE SERPL-SCNC: 108 MMOL/L (ref 98–107)
CHOLEST SERPL-MCNC: 144 MG/DL
CREAT SERPL-MCNC: 1.05 MG/DL (ref 0.51–0.95)
DEPRECATED HCO3 PLAS-SCNC: 28 MMOL/L (ref 22–29)
EGFRCR SERPLBLD CKD-EPI 2021: 55 ML/MIN/1.73M2
ERYTHROCYTE [DISTWIDTH] IN BLOOD BY AUTOMATED COUNT: 14.3 % (ref 10–15)
GLUCOSE SERPL-MCNC: 106 MG/DL (ref 70–99)
HBA1C MFR BLD: 5.9 % (ref 0–5.6)
HCT VFR BLD AUTO: 39.5 % (ref 35–47)
HDLC SERPL-MCNC: 48 MG/DL
HGB BLD-MCNC: 12.7 G/DL (ref 11.7–15.7)
LDLC SERPL CALC-MCNC: 64 MG/DL
MCH RBC QN AUTO: 30.2 PG (ref 26.5–33)
MCHC RBC AUTO-ENTMCNC: 32.2 G/DL (ref 31.5–36.5)
MCV RBC AUTO: 94 FL (ref 78–100)
NONHDLC SERPL-MCNC: 96 MG/DL
PLATELET # BLD AUTO: 208 10E3/UL (ref 150–450)
POTASSIUM SERPL-SCNC: 4.4 MMOL/L (ref 3.4–5.3)
PROT SERPL-MCNC: 7.8 G/DL (ref 6.4–8.3)
RBC # BLD AUTO: 4.21 10E6/UL (ref 3.8–5.2)
SODIUM SERPL-SCNC: 145 MMOL/L (ref 135–145)
TRIGL SERPL-MCNC: 159 MG/DL
TSH SERPL DL<=0.005 MIU/L-ACNC: 1.85 UIU/ML (ref 0.3–4.2)
WBC # BLD AUTO: 6.6 10E3/UL (ref 4–11)

## 2023-11-24 PROCEDURE — 80061 LIPID PANEL: CPT | Performed by: FAMILY MEDICINE

## 2023-11-24 PROCEDURE — 83036 HEMOGLOBIN GLYCOSYLATED A1C: CPT | Performed by: FAMILY MEDICINE

## 2023-11-24 PROCEDURE — G0439 PPPS, SUBSEQ VISIT: HCPCS | Performed by: FAMILY MEDICINE

## 2023-11-24 PROCEDURE — 36415 COLL VENOUS BLD VENIPUNCTURE: CPT | Performed by: FAMILY MEDICINE

## 2023-11-24 PROCEDURE — 80053 COMPREHEN METABOLIC PANEL: CPT | Performed by: FAMILY MEDICINE

## 2023-11-24 PROCEDURE — 84443 ASSAY THYROID STIM HORMONE: CPT | Performed by: FAMILY MEDICINE

## 2023-11-24 PROCEDURE — 85027 COMPLETE CBC AUTOMATED: CPT | Performed by: FAMILY MEDICINE

## 2023-11-24 PROCEDURE — 99214 OFFICE O/P EST MOD 30 MIN: CPT | Mod: 25 | Performed by: FAMILY MEDICINE

## 2023-11-24 RX ORDER — ALENDRONATE SODIUM 70 MG/1
TABLET ORAL
Qty: 12 TABLET | Refills: 4 | Status: SHIPPED | OUTPATIENT
Start: 2023-11-24

## 2023-11-24 RX ORDER — ROSUVASTATIN CALCIUM 40 MG/1
40 TABLET, COATED ORAL DAILY
Qty: 90 TABLET | Refills: 4 | Status: SHIPPED | OUTPATIENT
Start: 2023-11-24

## 2023-11-24 RX ORDER — METOPROLOL SUCCINATE 50 MG/1
50 TABLET, EXTENDED RELEASE ORAL DAILY
Qty: 90 TABLET | Refills: 4 | Status: SHIPPED | OUTPATIENT
Start: 2023-11-24

## 2023-11-24 RX ORDER — CALCIUM CARBONATE/VITAMIN D3 500 MG-10
1 TABLET ORAL DAILY
COMMUNITY
Start: 2023-02-09

## 2023-11-24 RX ORDER — EZETIMIBE 10 MG/1
10 TABLET ORAL DAILY
Qty: 90 TABLET | Refills: 3 | Status: SHIPPED | OUTPATIENT
Start: 2023-11-24

## 2023-11-24 ASSESSMENT — ENCOUNTER SYMPTOMS
HEMATOCHEZIA: 0
DIZZINESS: 0
NERVOUS/ANXIOUS: 0
ABDOMINAL PAIN: 0
DYSURIA: 0
PALPITATIONS: 1
PARESTHESIAS: 0
JOINT SWELLING: 1
DIARRHEA: 0
FREQUENCY: 0
HEMATURIA: 0
COUGH: 0
FEVER: 0
HEARTBURN: 0
HEADACHES: 0
BREAST MASS: 0
CHILLS: 0
NAUSEA: 0
MYALGIAS: 1
WEAKNESS: 1
SORE THROAT: 0
SHORTNESS OF BREATH: 1
EYE PAIN: 0
ARTHRALGIAS: 1
CONSTIPATION: 0

## 2023-11-24 ASSESSMENT — ACTIVITIES OF DAILY LIVING (ADL): CURRENT_FUNCTION: NO ASSISTANCE NEEDED

## 2023-11-24 NOTE — PATIENT INSTRUCTIONS
Patient Education   Personalized Prevention Plan  You are due for the preventive services outlined below.  Your care team is available to assist you in scheduling these services.  If you have already completed any of these items, please share that information with your care team to update in your medical record.  Health Maintenance Due   Topic Date Due     Osteoporosis Screening  10/26/2023     ANNUAL REVIEW OF HM ORDERS  11/01/2023     Annual Wellness Visit  11/01/2023     Learning About Dietary Guidelines  What are the Dietary Guidelines for Americans?     Dietary Guidelines for Americans provide tips for eating well and staying healthy. This helps reduce the risk for long-term (chronic) diseases.  These guidelines recommend that you:  Eat and drink the right amount for you. The U.S[place] government's food guide is called MyPlate. It can help you make your own well-balanced eating plan.  Try to balance your eating with your activity. This helps you stay at a healthy weight.  Drink alcohol in moderation, if at all.  Limit foods high in salt, saturated fat, trans fat, and added sugar.  These guidelines are from the U.S. Department of Agriculture and the U.S. Department of Health and Human Services. They are updated every 5 years.  What is MyPlate?  MyPlate is the U.[place]. government's food guide. It can help you make your own well-balanced eating plan. A balanced eating plan means that you eat enough, but not too much, and that your food gives you the nutrients you need to stay healthy.  MyPlate focuses on eating plenty of whole grains, fruits, and vegetables, and on limiting fat and sugar. It is available online at www.ChooseMyPlate.gov.  How can you get started?  If you're trying to eat healthier, you can slowly change your eating habits over time. You don't have to make big changes all at once. Start by adding one or two healthy foods to your meals each day.  Grains  Choose whole-grain breads and cereals and whole-wheat  "pasta and whole-grain crackers.  Vegetables  Eat a variety of vegetables every day. They have lots of nutrients and are part of a heart-healthy diet.  Fruits  Eat a variety of fruits every day. Fruits contain lots of nutrients. Choose fresh fruit instead of fruit juice.  Protein foods  Choose fish and lean poultry more often. Eat red meat and fried meats less often. Dried beans, tofu, and nuts are also good sources of protein.  Dairy  Choose low-fat or fat-free products from this food group. If you have problems digesting milk, try eating cheese or yogurt instead.  Fats and oils  Limit fats and oils if you're trying to cut calories. Choose healthy fats when you cook. These include canola oil and olive oil.  Where can you learn more?  Go to https://www.[employer].net/patiented  Enter D676 in the search box to learn more about \"Learning About Dietary Guidelines.\"  Current as of: February 28, 2023               Content Version: 13.8    4407-7285 [employer].   Care instructions adapted under license by your healthcare professional. If you have questions about a medical condition or this instruction, always ask your healthcare professional. [employer] disclaims any warranty or liability for your use of this information.      Hearing Loss: Care Instructions  Overview     Hearing loss is a sudden or slow decrease in how well you hear. It can range from slight to profound. Permanent hearing loss can occur with aging. It also can happen when you are exposed long-term to loud noise. Examples include listening to loud music, riding motorcycles, or being around other loud machines.  Hearing loss can affect your work and home life. It can make you feel lonely or depressed. You may feel that you have lost your independence. But hearing aids and other devices can help you hear better and feel connected to others.  Follow-up care is a key part of your treatment and safety. Be sure to make and go to all " appointments, and call your doctor if you are having problems. It's also a good idea to know your test results and keep a list of the medicines you take.  How can you care for yourself at home?  Avoid loud noises whenever possible. This helps keep your hearing from getting worse.  Always wear hearing protection around loud noises.  Wear a hearing aid as directed.  A professional can help you pick a hearing aid that will work best for you.  You can also get hearing aids over the counter for mild to moderate hearing loss.  Have hearing tests as your doctor suggests. They can show whether your hearing has changed. Your hearing aid may need to be adjusted.  Use other devices as needed. These may include:  Telephone amplifiers and hearing aids that can connect to a television, stereo, radio, or microphone.  Devices that use lights or vibrations. These alert you to the doorbell, a ringing telephone, or a baby monitor.  Television closed-captioning. This shows the words at the bottom of the screen. Most new TVs can do this.  TTY (text telephone). This lets you type messages back and forth on the telephone instead of talking or listening. These devices are also called TDD. When messages are typed on the keyboard, they are sent over the phone line to a receiving TTY. The message is shown on a monitor.  Use text messaging, social media, and email if it is hard for you to communicate by telephone.  Try to learn a listening technique called speechreading. It is not lipreading. You pay attention to people's gestures, expressions, posture, and tone of voice. These clues can help you understand what a person is saying. Face the person you are talking to, and have them face you. Make sure the lighting is good. You need to see the other person's face clearly.  Think about counseling if you need help to adjust to your hearing loss.  When should you call for help?  Watch closely for changes in your health, and be sure to contact your  "doctor if:    You think your hearing is getting worse.     You have new symptoms, such as dizziness or nausea.   Where can you learn more?  Go to https://www.[employer].net/patiented  Enter R798 in the search box to learn more about \"Hearing Loss: Care Instructions.\"  Current as of: February 28, 2023               Content Version: 13.8    9488-9587 [employer].   Care instructions adapted under license by your healthcare professional. If you have questions about a medical condition or this instruction, always ask your healthcare professional. [employer] disclaims any warranty or liability for your use of this information.      Bladder Training: Care Instructions  Your Care Instructions     Bladder training is used to treat urge incontinence and stress incontinence. Urge incontinence means that the need to urinate comes on so fast that you can't get to a toilet in time. Stress incontinence means that you leak urine because of pressure on your bladder. For example, it may happen when you laugh, cough, or lift something heavy.  Bladder training can increase how long you can wait before you have to urinate. It can also help your bladder hold more urine. And it can give you better control over the urge to urinate.  It is important to remember that bladder training takes a few weeks to a few months to make a difference. You may not see results right away, but don't give up.  Follow-up care is a key part of your treatment and safety. Be sure to make and go to all appointments, and call your doctor if you are having problems. It's also a good idea to know your test results and keep a list of the medicines you take.  How can you care for yourself at home?  Work with your doctor to come up with a bladder training program that is right for you. You may use one or more of the following methods.  Delayed urination  In the beginning, try to keep from urinating for 5 minutes after you first feel the need " "to go.  While you wait, take deep, slow breaths to relax. Kegel exercises can also help you delay the need to go to the bathroom.  After some practice, when you can easily wait 5 minutes to urinate, try to wait 10 minutes before you urinate.  Slowly increase the waiting period until you are able to control when you have to urinate.  Scheduled urination  Empty your bladder when you first wake up in the morning.  Schedule times throughout the day when you will urinate.  Start by going to the bathroom every hour, even if you don't need to go.  Slowly increase the time between trips to the bathroom.  When you have found a schedule that works well for you, keep doing it.  If you wake up during the night and have to urinate, do it. Apply your schedule to waking hours only.  Kegel exercises  These tighten and strengthen pelvic muscles, which can help you control the flow of urine. (If doing these exercises causes pain, stop doing them and talk with your doctor.) To do Kegel exercises:  Squeeze your muscles as if you were trying not to pass gas. Or squeeze your muscles as if you were stopping the flow of urine. Your belly, legs, and buttocks shouldn't move.  Hold the squeeze for 3 seconds, then relax for 5 to 10 seconds.  Start with 3 seconds, then add 1 second each week until you are able to squeeze for 10 seconds.  Repeat the exercise 10 times a session. Do 3 to 8 sessions a day.  When should you call for help?  Watch closely for changes in your health, and be sure to contact your doctor if:    Your incontinence is getting worse.     You do not get better as expected.   Where can you learn more?  Go to https://www.[employer].net/patiented  Enter V684 in the search box to learn more about \"Bladder Training: Care Instructions.\"  Current as of: February 28, 2023               Content Version: 13.8    7786-3728 [employer].   Care instructions adapted under license by your healthcare professional. If you have " questions about a medical condition or this instruction, always ask your healthcare professional. [employer] disclaims any warranty or liability for your use of this information.

## 2023-11-24 NOTE — PROGRESS NOTES
"SUBJECTIVE:   Colleen is a 73 year old, presenting for the following:  Wellness Visit (Fasting for labs.) and Foot Pain (Left foot. Sore last night, pain worse today.)    Chief Complaints and History of Present Illnesses   Patient presents with    Wellness Visit     Fasting for labs.    Foot Pain     Left foot. Sore last night, pain worse today.          11/24/2023     9:27 AM   Additional Questions   Roomed by Gayle NICOLAS CMA   Accompanied by Self       Are you in the first 12 months of your Medicare coverage?  No    Healthy Habits:     In general, how would you rate your overall health?  Good    Frequency of exercise:  2-3 days/week    Do you usually eat at least 4 servings of fruit and vegetables a day, include whole grains    & fiber and avoid regularly eating high fat or \"junk\" foods?  No    Taking medications regularly:  Yes    Medication side effects:  Not applicable    Ability to successfully perform activities of daily living:  No assistance needed    Home Safety:  Lack of grab bars in the bathroom    Hearing Impairment:  Difficulty following a conversation in a noisy restaurant or crowded room, need to ask people to speak up or repeat themselves and difficulty understanding soft or whispered speech    In the past 6 months, have you been bothered by leaking of urine? Yes    In general, how would you rate your overall mental or emotional health?  Good    Additional concerns today:  No      Today's PHQ-2 Score:       11/24/2023     9:06 AM   PHQ-2 ( 1999 [employer])   Q1: Little interest or pleasure in doing things 1   Q2: Feeling down, depressed or hopeless 1   PHQ-2 Score 2   Q1: Little interest or pleasure in doing things Several days   Q2: Feeling down, depressed or hopeless Several days   PHQ-2 Score 2       Foot Pain  Not sure what happened. Was on feet all day yesterday cooking and cleaning for Thanksgiving.  Foot sore when went to bed last night.   Hurt when she woke up last night.  Went back to bed, " "couldn't put any weight on her foot at first.   Pulled out walker around house, ambulating with cane out of the house.   Seems to be slowly improving but very painful.   Pain is around the ankle. Looks swollen.   Worse with motion, dorsiflexion painful.     Avoiding NSAIDs due to allergy.       Hypercholesteremia  Coronary artery disease due to calcified coronary lesion  Currently on Crestor 40mg daily and ASA 81mg daily.   Chest pain: still intermittent, stable. Met with cardiologist, told doing okay.    Scheduled to see cardiology on Monday.    The 10-year ASCVD risk score (Rahel SALCEDO, et al., 2019) is: 15.1%    Values used to calculate the score:      Age: 73 years      Sex: Female      Is Non- : No      Diabetic: No      Tobacco smoker: No      Systolic Blood Pressure: 133 mmHg      Is BP treated: No      HDL Cholesterol: 39 mg/dL      Total Cholesterol: 153 mg/dL     Lab Results   Component Value Date    CHOL 153 10/17/2022     Lab Results   Component Value Date    HDL 39 10/17/2022     Lab Results   Component Value Date    LDL 71 10/17/2022    LDL 92 01/14/2019     Lab Results   Component Value Date    TRIG 216 10/17/2022     No results found for: \"CHOLHDLRATIO\"      Age-related osteoporosis without current pathological fracture  Taking fosamax 70mg weekly. Started May 2019.   Doing calcium and vitamin D supplementation.       Morbid obesity (H)  Body mass index is 48.84 kg/m .    Wt Readings from Last 4 Encounters:   11/24/23 117.3 kg (258 lb 8 oz)   01/03/23 115.6 kg (254 lb 12.8 oz)   11/01/22 113.6 kg (250 lb 8 oz)   10/24/22 114.3 kg (252 lb)     Motivated to lose some weight to help with pain management.    Health Maintenance   Up to date vaccines     Have you ever done Advance Care Planning? (For example, a Health Directive, POLST, or a discussion with a medical provider or your loved ones about your wishes): Yes, patient states has an Advance Care Planning document and will bring " a copy to the clinic.       Fall risk  Fallen 2 or more times in the past year?: No  Any fall with injury in the past year?: No    Cognitive Screening   1) Repeat 3 items (Leader, Season, Table)    2) Clock draw: NORMAL  3) 3 item recall: Recalls 3 objects  Results: NORMAL clock, 1-2 items recalled: COGNITIVE IMPAIRMENT LESS LIKELY    Mini-CogTM Copyright [employer]. Licensed by the author for use in [facility]; reprinted with permission (moshe@[facility]). All rights reserved.        Reviewed and updated as needed this visit by clinical staff   Tobacco   Meds              Reviewed and updated as needed this visit by Provider   Tobacco  Allergies  Meds  Problems  Med Hx  Surg Hx  Fam Hx           Social History     Tobacco Use    Smoking status: Former     Types: Cigarettes     Quit date: 2001     Years since quittin.0    Smokeless tobacco: Never   Substance Use Topics    Alcohol use: No           2023     2:55 PM   Alcohol Use   Prescreen: >3 drinks/day or >7 drinks/week? Not Applicable     Do you have a current opioid prescription? No  Do you use any other controlled substances or medications that are not prescribed by a provider? None      Current providers sharing in care for this patient include:   Patient Care Team:  Susan Maddox DO as PCP - General (Family Medicine)  Susan Maddox DO as Assigned PCP  Devan Lima MD as Assigned Heart and Vascular Provider  Link Torres DO as Assigned Sleep Provider    The following health maintenance items are reviewed in Epic and correct as of today:  Health Maintenance   Topic Date Due    DEXA  10/26/2023    ANNUAL REVIEW OF HM ORDERS  2023    MEDICARE ANNUAL WELLNESS VISIT  2023    MAMMO SCREENING  10/17/2024    FALL RISK ASSESSMENT  2024    COLORECTAL CANCER SCREENING  2026    LIPID  10/17/2027    ADVANCE CARE PLANNING  2027    DTAP/TDAP/TD IMMUNIZATION (3 - Td or Tdap) 2028     HEPATITIS C SCREENING  Completed    PHQ-2 (once per calendar year)  Completed    INFLUENZA VACCINE  Completed    Pneumococcal Vaccine: 65+ Years  Completed    RSV VACCINE (Pregnancy & 60+)  Completed    COVID-19 Vaccine  Completed    ZOSTER IMMUNIZATION  Addressed    IPV IMMUNIZATION  Aged Out    HPV IMMUNIZATION  Aged Out    MENINGITIS IMMUNIZATION  Aged Out    RSV MONOCLONAL ANTIBODY  Aged Out    LUNG CANCER SCREENING  Discontinued         Review of Systems   Constitutional:  Negative for chills and fever.   HENT:  Positive for hearing loss. Negative for congestion, ear pain and sore throat.    Eyes:  Positive for visual disturbance. Negative for pain.   Respiratory:  Positive for shortness of breath. Negative for cough.    Cardiovascular:  Positive for palpitations and peripheral edema. Negative for chest pain.   Gastrointestinal:  Negative for abdominal pain, constipation, diarrhea, heartburn, hematochezia and nausea.   Breasts:  Negative for tenderness, breast mass and discharge.   Genitourinary:  Positive for urgency. Negative for dysuria, frequency, genital sores, hematuria, pelvic pain, vaginal bleeding and vaginal discharge.   Musculoskeletal:  Positive for arthralgias, joint swelling and myalgias.   Skin:  Negative for rash.   Neurological:  Positive for weakness. Negative for dizziness, headaches and paresthesias.   Psychiatric/Behavioral:  Positive for mood changes. The patient is not nervous/anxious.      HEARING LOSS: wearing hearing aids, good ones , can't afford to replace, son had purchased a pair through [employer]. Plug stuck in ear. Bought a new set recently.       VISUAL DISTURBANCE: Improved since cataract surgery. Using cheaters for reading.       SHORTNESS OF BREATH: baseline, unchanged.        PALPITATIONS: unchanged, used to it        EDEMA: intermittent        URINE URGENCY: no major incontinence. Doesn't have urge to go, gets up and goes to do something, then needs to go. Slight leakage.  "Using pads, depends at times when she will be out for a while.        MUSCLE/JOINT PAIN: regular        WEAKNESS: noticed yesterday, harder to open something. No numbness or tingling. Arthritis is worsening in her hands.       MOOD CHANGES: normal fluctuations.        OBJECTIVE:   /66   Pulse 66   Temp 98.4  F (36.9  C) (Oral)   Resp 16   Ht 1.549 m (5' 1\")   Wt 117.3 kg (258 lb 8 oz)   LMP  (LMP Unknown)   SpO2 94%   BMI 48.84 kg/m   Estimated body mass index is 48.84 kg/m  as calculated from the following:    Height as of this encounter: 1.549 m (5' 1\").    Weight as of this encounter: 117.3 kg (258 lb 8 oz).  Physical Exam  GENERAL: healthy, alert and no distress  EYES: Eyes grossly normal to inspection, PERRL and conjunctivae and sclerae normal  HENT: ear canals and TM's normal, nose and mouth without ulcers or lesions  NECK: no adenopathy, no asymmetry, masses, or scars and thyroid normal to palpation  RESP: lungs clear to auscultation - no rales, rhonchi or wheezes  CV: regular rate and rhythm, normal S1 S2, no S3 or S4, no murmur, click or rub, no peripheral edema and peripheral pulses strong  ABDOMEN: soft, nontender, no hepatosplenomegaly, no masses and bowel sounds normal  MS: Trace lower extremity edema bilaterally, no focal erythema or warmth.  SKIN: no suspicious lesions or rashes  NEURO: Normal strength and tone, mentation intact and speech normal  PSYCH: mentation appears normal, affect normal/bright        ASSESSMENT / PLAN:     1. Encounter for annual wellness exam in Medicare patient  - REVIEW OF HEALTH MAINTENANCE PROTOCOL ORDERS    Reviewed health history and health maintenance recommendations.    2. Morbid obesity (H)  - Comprehensive metabolic panel (BMP + Alb, Alk Phos, ALT, AST, Total. Bili, TP); Future  - Lipid panel reflex to direct LDL Fasting; Future  - CBC with platelets; Future  - Hemoglobin A1c; Future  - TSH with free T4 reflex; Future    Motivated to work on weight loss " due to chronic pain.  BMI is 48.  We will obtain monitoring labs today.  Reviewed healthy lifestyle modifications.  We discussed medication options, consider Topamax versus GLP-1 agonist.  Await lab results, evaluate insurance coverage.    3. Pain in joint, ankle and foot, left  Pain in the left ankle, acute onset yesterday.  Differential diagnosis includes arthritis versus tendinitis versus possibly gout versus pseudogout.  Symptoms improving already, will continue to monitor.  Recommended scheduled Tylenol (unable to use NSAIDs due to side effects), ice, rest and elevation.  Offered x-ray though I do not suspect a fracture, would not change treatment plan.  Deferring imaging today.  Offered a boot or splint, she is doing okay with cane.  Defer DME supplies at this time.    4. Hypercholesteremia  5. Coronary artery disease due to calcified coronary lesion  6. Stable angina  - ezetimibe (ZETIA) 10 MG tablet; Take 1 tablet (10 mg) by mouth daily  Dispense: 90 tablet; Refill: 3  - metoprolol succinate ER (TOPROL XL) 50 MG 24 hr tablet; Take 1 tablet (50 mg) by mouth daily  Dispense: 90 tablet; Refill: 4  - rosuvastatin (CRESTOR) 40 MG tablet; Take 1 tablet (40 mg) by mouth daily  Dispense: 90 tablet; Refill: 4    Scheduled to see cardiology on Monday.  Symptoms are stable.  Continue current treatment plan.  Monitoring labs today.  Work on healthy lifestyle modifications.    7. Age-related osteoporosis without current pathological fracture  - DEXA HIP/PELVIS/SPINE - Future; Future  - alendronate (FOSAMAX) 70 MG tablet; TAKE 1 TABLET ONCE EVERY 7 DAYS IN THE A.M. ON EMPTY STOMACH WITH FULL GLASS WATER 30MIN BEFORE FOOD  Dispense: 12 tablet; Refill: 4  - Comprehensive metabolic panel (BMP + Alb, Alk Phos, ALT, AST, Total. Bili, TP); Future    Will have been on alendronate for 5 years, may.  We will plan for updated DEXA in May, consider drug holiday.    8. Encounter for screening mammogram for breast cancer  - MA Screen  "Bilateral w/Haider; Future      Patient has been advised of split billing requirements and indicates understanding: Yes      COUNSELING:  Reviewed preventive health counseling, as reflected in patient instructions      BMI:   Estimated body mass index is 48.84 kg/m  as calculated from the following:    Height as of this encounter: 1.549 m (5' 1\").    Weight as of this encounter: 117.3 kg (258 lb 8 oz).   Weight management plan: Discussed healthy diet and exercise guidelines      She reports that she quit smoking about 22 years ago. Her smoking use included cigarettes. She has never used smokeless tobacco.      Appropriate preventive services were discussed with this patient, including applicable screening as appropriate for fall prevention, nutrition, physical activity, Tobacco-use cessation, weight loss and cognition.  Checklist reviewing preventive services available has been given to the patient.    Reviewed patients plan of care and provided an AVS. The Intermediate Care Plan ( asthma action plan, low back pain action plan, and migraine action plan) for Colleen meets the Care Plan requirement. This Care Plan has been established and reviewed with the Patient.          Susan Maddox, [facility]    Identified Health Risks:  I have reviewed Opioid Use Disorder and Substance Use Disorder risk factors and made any needed referrals. The patient was counseled and encouraged to consider modifying their diet and eating habits. She was provided with information on recommended healthy diet options.  The patient was provided with written information regarding signs of hearing loss.  Information on urinary incontinence and treatment options given to patient.  "

## 2023-11-27 ENCOUNTER — TELEPHONE (OUTPATIENT)
Dept: FAMILY MEDICINE | Facility: CLINIC | Age: 73
End: 2023-11-27

## 2023-11-27 ENCOUNTER — OFFICE VISIT (OUTPATIENT)
Dept: CARDIOLOGY | Facility: CLINIC | Age: 73
End: 2023-11-27
Payer: COMMERCIAL

## 2023-11-27 VITALS
DIASTOLIC BLOOD PRESSURE: 80 MMHG | BODY MASS INDEX: 49.5 KG/M2 | WEIGHT: 262 LBS | RESPIRATION RATE: 20 BRPM | SYSTOLIC BLOOD PRESSURE: 122 MMHG | OXYGEN SATURATION: 96 % | HEART RATE: 68 BPM

## 2023-11-27 DIAGNOSIS — I25.10 CORONARY ARTERY DISEASE DUE TO CALCIFIED CORONARY LESION: Primary | ICD-10-CM

## 2023-11-27 DIAGNOSIS — I25.84 CORONARY ARTERY DISEASE DUE TO CALCIFIED CORONARY LESION: Primary | ICD-10-CM

## 2023-11-27 DIAGNOSIS — E66.01 MORBID OBESITY (H): Primary | ICD-10-CM

## 2023-11-27 DIAGNOSIS — R73.03 PREDIABETES: ICD-10-CM

## 2023-11-27 PROCEDURE — 99214 OFFICE O/P EST MOD 30 MIN: CPT | Performed by: INTERNAL MEDICINE

## 2023-11-27 NOTE — TELEPHONE ENCOUNTER
FYI - Status Update    Who is Calling: pharmacy    Update: pharmacy letting us know that they do not cover We[employer] on her medicare part D. They want to know if you recommend an alternative medication or if you want her to pay out of pocket for this medication?  Please advise.     Does caller want a call/response back: Yes     Could we send this information to you in [employer] or would you prefer to receive a phone call?:   Patient would prefer a phone call   Okay to leave a detailed message?: Yes at Cell number on file:    Telephone Information:   Mobile [phone]

## 2023-11-27 NOTE — PROGRESS NOTES
Cardiology Progress Note     Assessment:    Coronary artery disease status post coronary artery bypass graft surgery in 2016, stable, no angina  Chronic  incisional chest discomfort with stable sternum/normal CT, improved  Chronic exertional dyspnea, multifactorial, suspect combination of deconditioning and obesity playing a major role, no evidence of heart failure, diaphragmatic paralysis or intrinsic lung disease  Benign positional vertigo, resolved  Hypercholesterolemia, on Crestor and Zetia, good control  Morbid obesity  Prediabetes  History of DVT and PE  DJD right knee, status post replacement    Plan:  I suspect that the major factor for her shortness of breath is progressive weight gain.  She does not appear to be fluid overloaded on exam.  She may benefit from pharmacological intervention to decrease her weight.  She has been  working with her primary physicians on that project    Continue current cardiac medications    Follow-up in 1 year    Subjective:   This is 73 year old female who comes in today for follow-up visit.  She continues to have mild incisional chest discomfort.  She denies any exertional chest pains.  She gets short of breath with walking.  She has no shortness of breath at rest.  She has been steadily gaining weight.  Her primary physician prescribed Wegovy for her.  She has not started it yet    Review of Systems:   Negative other than history of present illness    Objective:   /80   Pulse 68   Resp 20   Wt 118.8 kg (262 lb)   LMP  (LMP Unknown)   SpO2 96%   BMI 49.50 kg/m    Physical Exam:  GENERAL: no distress  NECK: No JVD  LUNGS: Clear to auscultation.  CARDIAC: regular rhythm, S1 & S2 normal.  No heaves, thrills, gallops or murmurs.  ABDOMEN: flat, negative hepatosplenomegaly, soft and non-tender.  EXTREMITIES: No evidence of cyanosis, clubbing or edema.    Current Outpatient Medications   Medication Sig Dispense Refill    alendronate (FOSAMAX) 70 MG tablet TAKE 1  TABLET ONCE EVERY 7 DAYS IN THE A.M. ON EMPTY STOMACH WITH FULL GLASS WATER 30MIN BEFORE FOOD 12 tablet 4    aspirin 81 MG EC tablet [ASPIRIN 81 MG EC TABLET] Take 81 mg by mouth daily.      Calcium Carb-Cholecalciferol 500-10 MG-MCG TABS Take 1 tablet by mouth daily      cholecalciferol, vitamin D3, 1,000 unit (25 mcg) tablet Take 1,000 Units by mouth daily       ezetimibe (ZETIA) 10 MG tablet Take 1 tablet (10 mg) by mouth daily 90 tablet 3    metoprolol succinate ER (TOPROL XL) 50 MG 24 hr tablet Take 1 tablet (50 mg) by mouth daily 90 tablet 4    multivitamin with minerals (THERA-M) 9 mg iron-400 mcg Tab tablet [MULTIVITAMIN WITH MINERALS (THERA-M) 9 MG IRON-400 MCG TAB TABLET] Take 1 tablet by mouth daily.      rosuvastatin (CRESTOR) 40 MG tablet Take 1 tablet (40 mg) by mouth daily 90 tablet 4    Semaglutide-Weight Management (WEGOVY) 0.25 MG/0.5ML pen Inject 0.25 mg Subcutaneous once a week (Patient not taking: Reported on 2023) 2 mL 0       Cardiographics:    EC2018 read by me sinus rhythm incomplete right bundle branch block no changes from 2017     Coronary angio: May 2017      LM mild dz  LAD severe dz mid with patent LIMA, small diagonal  Circ severe dz in OM with patnet SVG  RCA severe dz in mid with patent SVG  LVEDP 8mmHg  Aortic root mildly enlarged       Patient began having severe chest pain when laying flat, worse with direct pressure over sternal incision site.      Imp/plan:  1. Chest pain - musculoskeletal -related to either incisino or to statin - schedule CTnon contrast of sternum, follow up with CT surgery in clinic, hold statin for four weeks and rechalleng  2. CAD s/p CABG 3 vessel all grafts patent      Echo: 2016  Normal left ventricular size and systolic function.   Left ventricular ejection fraction is visually estimated to be 65%.   No significant valvular abnormalities.      Holter: 2017  The predominant rhythm is sinus rhythm which is present throughout the  "monitoring period.  Rates range from 52 bpm to 160 bpm.  There were no significant pauses or bradycardia.      Only for atrial premature beats were present over 24 hours.    No ventricular beats were present.      Symptoms:  Symptoms of Dizziness were associated with Sinus rhythm 83 bpm.  Symptoms of shortness of breath were associated with sinus tachycardia rate 104 and 128 bpm.  Symptoms of chest pressure was associated with sinus rhythm rate 91 bpm, all without ectopy        September 2021  A single 7 beat run of nonsustained VT was noted.  Otherwise first-degree AV block was present with minor sinus arrhythmia.     Lab Results    Chemistry/lipid CBC Cardiac Enzymes/BNP/TSH/INR   Recent Labs   Lab Test 11/24/23  1029   CHOL 144   HDL 48*   LDL 64   TRIG 159*     Recent Labs   Lab Test 11/24/23  1029 10/17/22  0853 09/08/21  1136   LDL 64 71 63     Recent Labs   Lab Test 11/24/23  1029      POTASSIUM 4.4   CHLORIDE 108*   CO2 28   *   BUN 25.2*   CR 1.05*   GFRESTIMATED 55*   ALECIA 9.5     Recent Labs   Lab Test 11/24/23  1029 11/01/22  1043 09/08/21  1136   CR 1.05* 1.00* 1.00     Recent Labs   Lab Test 11/24/23  1029 04/28/16  1448   A1C 5.9* 5.5          Recent Labs   Lab Test 11/24/23  1029   WBC 6.6   HGB 12.7   HCT 39.5   MCV 94        Recent Labs   Lab Test 11/24/23  1029 09/08/21  1136 08/18/20  1109   HGB 12.7 13.0 12.4    No results for input(s): \"TROPONINI\" in the last 39199 hours.  Recent Labs   Lab Test 10/07/19  0907   BNP 30     Recent Labs   Lab Test 11/24/23  1029   TSH 1.85     Recent Labs   Lab Test 05/11/18  0622 05/10/18  0550 05/09/18  0646   INR 1.39* 1.25* 1.13*                            "

## 2023-11-27 NOTE — LETTER
11/27/2023    Susan GAYTANEugenio Maddox, DO  [address]  [facility] [address]    RE: Colleen Samaniego       Dear Colleague,     I had the pleasure of seeing Colleen Samaniego in the [facility].      Cardiology Progress Note     Assessment:    Coronary artery disease status post coronary artery bypass graft surgery in 2016, stable, no angina  Chronic  incisional chest discomfort with stable sternum/normal CT, improved  Chronic exertional dyspnea, multifactorial, suspect combination of deconditioning and obesity playing a major role, no evidence of heart failure, diaphragmatic paralysis or intrinsic lung disease  Benign positional vertigo, resolved  Hypercholesterolemia, on Crestor and Zetia, good control  Morbid obesity  Prediabetes  History of DVT and PE  DJD right knee, status post replacement    Plan:  I suspect that the major factor for her shortness of breath is progressive weight gain.  She does not appear to be fluid overloaded on exam.  She may benefit from pharmacological intervention to decrease her weight.  She has been  working with her primary physicians on that project    Continue current cardiac medications    Follow-up in 1 year    Subjective:   This is 73 year old female who comes in today for follow-up visit.  She continues to have mild incisional chest discomfort.  She denies any exertional chest pains.  She gets short of breath with walking.  She has no shortness of breath at rest.  She has been steadily gaining weight.  Her primary physician prescribed Wegovy for her.  She has not started it yet    Review of Systems:   Negative other than history of present illness    Objective:   /80   Pulse 68   Resp 20   Wt 118.8 kg (262 lb)   LMP  (LMP Unknown)   SpO2 96%   BMI 49.50 kg/m    Physical Exam:  GENERAL: no distress  NECK: No JVD  LUNGS: Clear to auscultation.  CARDIAC: regular rhythm, S1 & S2 normal.  No heaves, thrills, gallops or murmurs.  ABDOMEN: flat, negative  hepatosplenomegaly, soft and non-tender.  EXTREMITIES: No evidence of cyanosis, clubbing or edema.    Current Outpatient Medications   Medication Sig Dispense Refill    alendronate (FOSAMAX) 70 MG tablet TAKE 1 TABLET ONCE EVERY 7 DAYS IN THE A.M. ON EMPTY STOMACH WITH FULL GLASS WATER 30MIN BEFORE FOOD 12 tablet 4    aspirin 81 MG EC tablet [ASPIRIN 81 MG EC TABLET] Take 81 mg by mouth daily.      Calcium Carb-Cholecalciferol 500-10 MG-MCG TABS Take 1 tablet by mouth daily      cholecalciferol, vitamin D3, 1,000 unit (25 mcg) tablet Take 1,000 Units by mouth daily       ezetimibe (ZETIA) 10 MG tablet Take 1 tablet (10 mg) by mouth daily 90 tablet 3    metoprolol succinate ER (TOPROL XL) 50 MG 24 hr tablet Take 1 tablet (50 mg) by mouth daily 90 tablet 4    multivitamin with minerals (THERA-M) 9 mg iron-400 mcg Tab tablet [MULTIVITAMIN WITH MINERALS (THERA-M) 9 MG IRON-400 MCG TAB TABLET] Take 1 tablet by mouth daily.      rosuvastatin (CRESTOR) 40 MG tablet Take 1 tablet (40 mg) by mouth daily 90 tablet 4    Semaglutide-Weight Management (WEGOVY) 0.25 MG/0.5ML pen Inject 0.25 mg Subcutaneous once a week (Patient not taking: Reported on 2023) 2 mL 0       Cardiographics:    EC2018 read by me sinus rhythm incomplete right bundle branch block no changes from 2017     Coronary angio: May 2017      LM mild dz  LAD severe dz mid with patent LIMA, small diagonal  Circ severe dz in OM with patnet SVG  RCA severe dz in mid with patent SVG  LVEDP 8mmHg  Aortic root mildly enlarged       Patient began having severe chest pain when laying flat, worse with direct pressure over sternal incision site.      Imp/plan:  1. Chest pain - musculoskeletal -related to either incisino or to statin - schedule CTnon contrast of sternum, follow up with CT surgery in clinic, hold statin for four weeks and rechalleng  2. CAD s/p CABG 3 vessel all grafts patent      Echo: 2016  Normal left ventricular size and systolic  "function.   Left ventricular ejection fraction is visually estimated to be 65%.   No significant valvular abnormalities.      Holter: July 2017  The predominant rhythm is sinus rhythm which is present throughout the monitoring period.  Rates range from 52 bpm to 160 bpm.  There were no significant pauses or bradycardia.      Only for atrial premature beats were present over 24 hours.    No ventricular beats were present.      Symptoms:  Symptoms of Dizziness were associated with Sinus rhythm 83 bpm.  Symptoms of shortness of breath were associated with sinus tachycardia rate 104 and 128 bpm.  Symptoms of chest pressure was associated with sinus rhythm rate 91 bpm, all without ectopy        September 2021  A single 7 beat run of nonsustained VT was noted.  Otherwise first-degree AV block was present with minor sinus arrhythmia.     Lab Results    Chemistry/lipid CBC Cardiac Enzymes/BNP/TSH/INR   Recent Labs   Lab Test 11/24/23  1029   CHOL 144   HDL 48*   LDL 64   TRIG 159*     Recent Labs   Lab Test 11/24/23  1029 10/17/22  0853 09/08/21  1136   LDL 64 71 63     Recent Labs   Lab Test 11/24/23  1029      POTASSIUM 4.4   CHLORIDE 108*   CO2 28   *   BUN 25.2*   CR 1.05*   GFRESTIMATED 55*   ALECIA 9.5     Recent Labs   Lab Test 11/24/23  1029 11/01/22  1043 09/08/21  1136   CR 1.05* 1.00* 1.00     Recent Labs   Lab Test 11/24/23  1029 04/28/16  1448   A1C 5.9* 5.5          Recent Labs   Lab Test 11/24/23  1029   WBC 6.6   HGB 12.7   HCT 39.5   MCV 94        Recent Labs   Lab Test 11/24/23  1029 09/08/21  1136 08/18/20  1109   HGB 12.7 13.0 12.4    No results for input(s): \"TROPONINI\" in the last 68472 hours.  Recent Labs   Lab Test 10/07/19  0907   BNP 30     Recent Labs   Lab Test 11/24/23  1029   TSH 1.85     Recent Labs   Lab Test 05/11/18  0622 05/10/18  0550 05/09/18  0646   INR 1.39* 1.25* 1.13*                                Thank you for allowing me to participate in the care of your " patient.      Sincerely,     Devan Lima MD     [facility]  cc:   Devan Lima MD  [address]  [address]  [place] [address]

## 2023-11-27 NOTE — PATIENT INSTRUCTIONS
Colleen Samaniego,    It was a pleasure to see you today at the [facility].     My recommendations after this visit include:    Work on weight reduction  Same heart medications    TIFFANIE Lima MD, FACC, VINAY

## 2023-11-28 ENCOUNTER — TELEPHONE (OUTPATIENT)
Dept: FAMILY MEDICINE | Facility: CLINIC | Age: 73
End: 2023-11-28
Payer: COMMERCIAL

## 2023-11-28 DIAGNOSIS — R73.03 PREDIABETES: ICD-10-CM

## 2023-11-28 DIAGNOSIS — E66.01 MORBID OBESITY (H): ICD-10-CM

## 2023-11-28 RX ORDER — TOPIRAMATE 25 MG/1
25 TABLET, FILM COATED ORAL DAILY
Qty: 90 TABLET | Refills: 3 | Status: SHIPPED | OUTPATIENT
Start: 2023-11-28 | End: 2024-01-04

## 2023-11-28 NOTE — TELEPHONE ENCOUNTER
Called and informed patient of message below. Patient states that we need to do a PA for medication to be covered. I sent a message to the PA team regarding Ozempic.

## 2023-11-28 NOTE — TELEPHONE ENCOUNTER
Relayed msg. Patient would like to try Topamax. Her cardiologist also suggested Ozempic as an alternative. Patient is also calling medicare d to check coverage/alternatives. Will let us know.

## 2023-11-28 NOTE — TELEPHONE ENCOUNTER
As requested, I have sent in a prescription for Ozempic.  I would be surprised if her insurance would cover this for weight loss, possibly they cover it for prediabetes.  Typically this is only covered for diabetic patients.  If covered and cost affordable, she can pick that up and send me an update in a month with how she is doing and we can titrate dose as needed .  If not covered, I did send her a [employer] message with the Topamax instructions.  She can refer to [employer].  If she does not have access, please relay that message as well.    Susan Maddox, DO

## 2023-11-28 NOTE — TELEPHONE ENCOUNTER
Please connect with Colleen. We had discussed Topamax as an alternative if Wegovy cost prohibitive. Would she like to try this medication instead?  Susan Maddox, DO

## 2023-11-28 NOTE — TELEPHONE ENCOUNTER
1. Morbid obesity (H)  - topiramate (TOPAMAX) 25 MG tablet; Take 1 tablet (25 mg) by mouth daily  Dispense: 90 tablet; Refill: 3     Susan Maddox DO

## 2023-11-28 NOTE — TELEPHONE ENCOUNTER
Patient called insurance. She said she was a bit confused by the representative and listed the following as possibly being covered    Ozempic or Trulicity '    They would need a PA.    I also talked to her about the Topamax and she is asking for you opinion on how to proceed.    French Feldman RN     [facility]

## 2023-11-30 DIAGNOSIS — E66.01 MORBID OBESITY (H): ICD-10-CM

## 2023-11-30 NOTE — TELEPHONE ENCOUNTER
Patient called her insurance company and they stated Ozempic was what was covered.  Can we find out what is covered for her under her formulary?  Susan Maddox, DO

## 2023-11-30 NOTE — TELEPHONE ENCOUNTER
Called [employer] and talked with rep:    Ozempic requires step therapy, Wegovy is non-formulary and would need an exception filled out. This is the same for Saxenda and Phentermine.    Metformin is tier 1, topiramate is tier 2

## 2023-11-30 NOTE — TELEPHONE ENCOUNTER
Yes please. Patient already had for Wegovy and was told Ozempic covered, so it may help if our team reaches out.  Appreciate your help and time on this.  Susan Maddox, DO

## 2023-11-30 NOTE — TELEPHONE ENCOUNTER
Incoming fax from [facility] pharmacy stating that insurance want pt to go under a step therapy or another alternative therapy before using ozempic. Please advise

## 2023-11-30 NOTE — TELEPHONE ENCOUNTER
Reached out to [facility] to determine if they had any additional information to provide regarding alternatives.    [facility] did not get additional information.    Can we reach out to patient's insurance?

## 2023-12-01 NOTE — TELEPHONE ENCOUNTER
PRIOR AUTHORIZATION DENIED    Medication: OZEMPIC (0.25 OR 0.5 MG/DOSE) 2 MG/3ML SC SOPN  Insurance Company: Other (see comments)Comment:  [employer] [phone]  Denial Date: 12/1/2023  Denial Rational:         Appeal Information: N/A          Patient Notified: No

## 2023-12-01 NOTE — TELEPHONE ENCOUNTER
Central Prior Authorization Team   Phone: [phone]    PA Initiation    Medication: OZEMPIC (0.25 OR 0.5 MG/DOSE) 2 MG/3ML SC SOPN  Insurance Company: Other (see comments)Comment:  [employer] [phone]  Pharmacy Filling the Rx: [id number] [place], [place] - [address] [place] [address]  Filling Pharmacy Phone: [phone]  Filling Pharmacy Fax:    Start Date: 12/1/2023

## 2023-12-05 NOTE — TELEPHONE ENCOUNTER
Please update patient on declined PA.    Does she want to try metformin? If she wants to proceed that route, I can send in a prescription. We won't meet criteria for consideration of ozempic (even if prediabetes an indication) until we fail metformin.    Susan Maddox, DO

## 2023-12-08 NOTE — TELEPHONE ENCOUNTER
1. Morbid obesity (H)  2. Prediabetes  - metFORMIN (GLUCOPHAGE) 500 MG tablet; Take 1 tablet (500 mg) by mouth daily (with dinner) for 7 days, THEN 2 tablets (1,000 mg) daily (with dinner) for 83 days.  Dispense: 173 tablet; Refill: 0     GFR Estimate   Date Value Ref Range Status   11/24/2023 55 (L) >60 mL/min/1.73m2 Final   08/18/2020 57 (L) >60 mL/min/1.73m2 Final       Please connect with Colleen.  Prescription for metformin sent to the pharmacy. She should take 500 mg (1 tablet) daily with supper for the first week. If tolerating, increase to 1000mg (2 tablets) with supper.  She should send us an update as to how she is doing, how she is tolerating the medication, and any updates on weight management before 3 months so we can discuss whether or not to continue the metformin at that time.  Reach out sooner with any side effects or concerns.    Susan Maddox, DO

## 2024-01-01 ASSESSMENT — SLEEP AND FATIGUE QUESTIONNAIRES
HOW LIKELY ARE YOU TO NOD OFF OR FALL ASLEEP IN A CAR, WHILE STOPPED FOR A FEW MINUTES IN TRAFFIC: WOULD NEVER DOZE
HOW LIKELY ARE YOU TO NOD OFF OR FALL ASLEEP WHILE LYING DOWN TO REST IN THE AFTERNOON WHEN CIRCUMSTANCES PERMIT: SLIGHT CHANCE OF DOZING
HOW LIKELY ARE YOU TO NOD OFF OR FALL ASLEEP WHILE SITTING INACTIVE IN A PUBLIC PLACE: WOULD NEVER DOZE
HOW LIKELY ARE YOU TO NOD OFF OR FALL ASLEEP WHILE WATCHING TV: MODERATE CHANCE OF DOZING
HOW LIKELY ARE YOU TO NOD OFF OR FALL ASLEEP WHEN YOU ARE A PASSENGER IN A CAR FOR AN HOUR WITHOUT A BREAK: SLIGHT CHANCE OF DOZING
HOW LIKELY ARE YOU TO NOD OFF OR FALL ASLEEP WHILE SITTING AND TALKING TO SOMEONE: WOULD NEVER DOZE
HOW LIKELY ARE YOU TO NOD OFF OR FALL ASLEEP WHILE SITTING AND READING: SLIGHT CHANCE OF DOZING
HOW LIKELY ARE YOU TO NOD OFF OR FALL ASLEEP WHILE SITTING QUIETLY AFTER LUNCH WITHOUT ALCOHOL: SLIGHT CHANCE OF DOZING

## 2024-01-04 ENCOUNTER — HOSPITAL ENCOUNTER (EMERGENCY)
Facility: CLINIC | Age: 74
Discharge: HOME | End: 2024-01-04
Attending: PHYSICIAN ASSISTANT | Admitting: PHYSICIAN ASSISTANT
Payer: COMMERCIAL

## 2024-01-04 ENCOUNTER — OFFICE VISIT (OUTPATIENT)
Dept: SLEEP MEDICINE | Facility: CLINIC | Age: 74
End: 2024-01-04
Payer: COMMERCIAL

## 2024-01-04 ENCOUNTER — APPOINTMENT (OUTPATIENT)
Dept: GENERAL RADIOLOGY | Facility: CLINIC | Age: 74
End: 2024-01-04
Attending: PHYSICIAN ASSISTANT
Payer: COMMERCIAL

## 2024-01-04 VITALS
HEART RATE: 80 BPM | HEIGHT: 61 IN | RESPIRATION RATE: 16 BRPM | TEMPERATURE: 99 F | DIASTOLIC BLOOD PRESSURE: 71 MMHG | BODY MASS INDEX: 47.65 KG/M2 | OXYGEN SATURATION: 93 % | SYSTOLIC BLOOD PRESSURE: 148 MMHG

## 2024-01-04 VITALS
HEART RATE: 87 BPM | BODY MASS INDEX: 47.65 KG/M2 | OXYGEN SATURATION: 92 % | DIASTOLIC BLOOD PRESSURE: 75 MMHG | RESPIRATION RATE: 18 BRPM | WEIGHT: 252.2 LBS | SYSTOLIC BLOOD PRESSURE: 133 MMHG

## 2024-01-04 DIAGNOSIS — G47.33 OSA (OBSTRUCTIVE SLEEP APNEA): Primary | ICD-10-CM

## 2024-01-04 DIAGNOSIS — E66.01 MORBID OBESITY (H): ICD-10-CM

## 2024-01-04 DIAGNOSIS — J20.9 ACUTE BRONCHITIS: ICD-10-CM

## 2024-01-04 DIAGNOSIS — R09.02 HYPOXIA: ICD-10-CM

## 2024-01-04 LAB
FLUAV RNA SPEC QL NAA+PROBE: NEGATIVE
FLUBV RNA RESP QL NAA+PROBE: NEGATIVE
RSV RNA SPEC NAA+PROBE: NEGATIVE
SARS-COV-2 RNA RESP QL NAA+PROBE: NEGATIVE

## 2024-01-04 PROCEDURE — 99214 OFFICE O/P EST MOD 30 MIN: CPT | Performed by: INTERNAL MEDICINE

## 2024-01-04 PROCEDURE — 87637 SARSCOV2&INF A&B&RSV AMP PRB: CPT | Performed by: PHYSICIAN ASSISTANT

## 2024-01-04 PROCEDURE — 99214 OFFICE O/P EST MOD 30 MIN: CPT | Mod: 25 | Performed by: PHYSICIAN ASSISTANT

## 2024-01-04 PROCEDURE — 71046 X-RAY EXAM CHEST 2 VIEWS: CPT

## 2024-01-04 PROCEDURE — 94640 AIRWAY INHALATION TREATMENT: CPT | Performed by: PHYSICIAN ASSISTANT

## 2024-01-04 PROCEDURE — 250N000009 HC RX 250: Performed by: PHYSICIAN ASSISTANT

## 2024-01-04 PROCEDURE — G0463 HOSPITAL OUTPT CLINIC VISIT: HCPCS | Mod: 25 | Performed by: PHYSICIAN ASSISTANT

## 2024-01-04 RX ORDER — ALBUTEROL SULFATE 0.83 MG/ML
2.5 SOLUTION RESPIRATORY (INHALATION) ONCE
Status: COMPLETED | OUTPATIENT
Start: 2024-01-04 | End: 2024-01-04

## 2024-01-04 RX ORDER — ALBUTEROL SULFATE 90 UG/1
2 AEROSOL, METERED RESPIRATORY (INHALATION) EVERY 6 HOURS PRN
Qty: 18 G | Refills: 0 | Status: SHIPPED | OUTPATIENT
Start: 2024-01-04

## 2024-01-04 RX ORDER — PREDNISONE 20 MG/1
TABLET ORAL
Qty: 10 TABLET | Refills: 0 | Status: SHIPPED | OUTPATIENT
Start: 2024-01-04

## 2024-01-04 RX ADMIN — ALBUTEROL SULFATE 2.5 MG: 2.5 SOLUTION RESPIRATORY (INHALATION) at 15:46

## 2024-01-04 ASSESSMENT — ACTIVITIES OF DAILY LIVING (ADL): ADLS_ACUITY_SCORE: 33

## 2024-01-04 NOTE — ED PROVIDER NOTES
History     Chief Complaint   Patient presents with    Cough     Cough congestion and wheezing       HPI  Colleen Samaniego is a 73 year old female past medical history significant for obstructive sleep apnea, coronary artery disease status post CABG x 3, history of DVT not currently on anticoagulants, obesity.  who presents to urgent care recommendation follow-up sleep medicine provider concern over cough, shortness of breath.  Patient reports that she came down with what she thought was a viral illness approximately 1 month ago with subjective fever, chills, myalgias, sore throat, nasal congestion.  She thought she was improving, however in the last two weeks she has developed sensation of chest pressure/shortness of breath.  Sleep medicine provider noted that she had diffuse expiratory wheezing and had hypoxia with oxygen saturations down to 87% with ambulation recommended evaluation in the emergency department however patient refused and instead presented to the urgent care.  She denies any current fever, dizziness, lightheadedness, palpitations, nausea, vomiting, diarrhea, abdominal pain, lower extremity edema.  She is a former smoker who quit approximately 20 years ago per epic records    Allergies:  Allergies   Allergen Reactions    Aleve [Naproxen] Unknown     Causes acid reflux    Amoxicillin Hives    Atorvastatin Unknown     Annotation: myalgia      Clavulanic Acid Nausea and Vomiting    Doxycycline Hyclate [Doxycycline] Nausea and Diarrhea    Ibuprofen Swelling     Face swelled and became red/ blotchy.  Can take aspirin without reaction    Simvastatin Unknown     Didn't work for her.  Did not lower cholesterol much     Problem List:    Patient Active Problem List    Diagnosis Date Noted    Dyspnea on exertion 10/07/2019     Priority: Medium    Morbid obesity (H) 07/17/2019     Priority: Medium    Age-related osteoporosis without current pathological fracture 05/21/2019     Priority: Medium     Starting  Fosamax 19         History of rheumatic fever      Priority: Medium     Hospitalized in 1953        Hypercholesteremia      Priority: Medium    Obstructive sleep apnea      Priority: Medium     Created by Conversion        History of pulmonary embolism      Priority: Medium    S/P CABG x 3      Priority: Medium    Stable angina 2017     Priority: Medium    GERD (gastroesophageal reflux disease) 05/10/2016     Priority: Medium    History of DVT (deep vein thrombosis) 2016     Priority: Medium    Coronary artery disease due to calcified coronary lesion 04/15/2016     Priority: Medium    S/P knee replacement 2015     Priority: Medium    Feelings Of Urinary Urgency      Priority: Medium     Created by Conversion          Past Medical History:    Past Medical History:   Diagnosis Date    Acute blood loss anemia 2015    Acute pulmonary embolism (H) 2015    Acute respiratory failure with hypoxia (H) 2015    Arthritis     Coronary artery disease     DVT (deep venous thrombosis) (H)     Family history of myocardial infarction     GERD (gastroesophageal reflux disease)     High cholesterol     History of anesthesia complications     History of blood clots     Hypercholesterolemia     Left leg DVT (H) 2015    PE (pulmonary thromboembolism) (H)     Pulmonary embolism, bilateral (H) 2015    Rheumatic fever     Sleep apnea, obstructive     Thrombocytopenia (H24) 2016       Past Surgical History:    Past Surgical History:   Procedure Laterality Date    APPENDECTOMY      BREAST CYST ASPIRATION      BYPASS GRAFT ARTERY CORONARY N/A 2016    Procedure: CORONARY ARTERY BYPASS GRAFT X 3, ENDOSCOPIC VEIN HARVEST, INTERNAL MAMMARY ARTERY, ANESTHESIA TRANSESOPHAGEAL ECHOCARDIOGRAM ;  Surgeon: Rancho Rodas MD;  Location: [facility];  Service:     CARDIAC CATHETERIZATION      prior to CABG    CARDIAC CATHETERIZATION  2017    no intervention     SECTION        x2    HYSTERECTOMY      JOINT REPLACEMENT      left knee    OOPHORECTOMY      [facility] CATH PLACEMENT & NJX CORONARY ART ANGIO IMG S&I N/A 2017    Procedure: Coronary Angiogram;  Surgeon: David Youngblood MD;  Location: [facility];  Service: Cardiology    KS CATH PLMT L HRT & ARTS W/NJX & ANGIO IMG S&I Left 2017    Procedure: Left Heart Catheterization Without Left Ventriculogram;  Surgeon: David Youngblood MD;  Location: [facility];  Service: Cardiology    WRIST SURGERY Right     [facility] TOTAL ABDOM HYSTERECTOMY      Description: Total Abdominal Hysterectomy;  Recorded: 2010;    [facility] TOTAL KNEE ARTHROPLASTY Left 2015    Procedure: LEFT TOTAL KNEE ARTHROPLASTY;  Surgeon: Good Suggs MD;  Location: [facility];  Service: Orthopedics    [facility] TOTAL KNEE ARTHROPLASTY Right 2018    Procedure:  RIGHT TOTAL KNEE ARTHROPLASTY COMPLEX BMI 46;  Surgeon: Good Suggs MD;  Location: [facility] OR;  Service: Orthopedics       Family History:    Family History   Problem Relation Age of Onset    Hypertension Mother     Heart Disease Mother     Heart Disease Father     Snoring Father     Cerebrovascular Disease Sister     Heart Disease Sister     Clotting Disorder Sister     Factor V Leiden deficiency Sister     Clotting Disorder Brother     Factor V Leiden deficiency Brother     Anesthesia Reaction No family hx of     Breast Cancer No family hx of        Social History:  Marital Status:   [4]  Social History     Tobacco Use    Smoking status: Former     Types: Cigarettes     Quit date: 2001     Years since quittin.1    Smokeless tobacco: Never   Vaping Use    Vaping Use: Never used   Substance Use Topics    Alcohol use: No    Drug use: No        Medications:    alendronate (FOSAMAX) 70 MG tablet  aspirin 81 MG EC tablet  Calcium Carb-Cholecalciferol 500-10 MG-MCG TABS  cholecalciferol, vitamin D3, 1,000 unit (25 mcg) tablet  ezetimibe (ZETIA) 10 MG  "tablet  metFORMIN (GLUCOPHAGE) 500 MG tablet  metoprolol succinate ER (TOPROL XL) 50 MG 24 hr tablet  multivitamin with minerals (THERA-M) 9 mg iron-400 mcg Tab tablet  rosuvastatin (CRESTOR) 40 MG tablet      Review of Systems  CONSTITUTIONAL:POSITIVE  for resolved subjective fever, chills, myalgias  INTEGUMENTARY/SKIN: NEGATIVE for worrisome rashes, moles or lesions  EYES: NEGATIVE for vision changes or irritation  ENT/MOUTH: POSITIVE for nasal congestion and NEGATIVE for sore throat, ear pain   RESP:POSITIVE for cough, chest tightness, shortness of breath, wheezing   GI: NEGATIVE for nausea, vomiting, diarrhea, abdominal pain  Physical Exam   BP: (!) 148/71  Pulse: 80  Temp: 99  F (37.2  C)  Resp: 16  Height: 154.9 cm (5' 1\")  SpO2: 93 %  Physical Exam  GENERAL APPEARANCE: alert, cooperative and no acute distress  EYES: EOMI,  PERRL, conjunctiva clear  HENT: ear canals and TM's normal.  Nose and mouth without ulcers, erythema or lesions  NECK: supple, nontender, no lymphadenopathy  RESP: coarse breath sounds,  diffuse expiratory wheezing throughout  CV: regular rates and rhythm, normal S1 S2, no murmur noted  SKIN: no suspicious lesions or rashes  ED Course             Procedures       Critical Care time:  none        Results for orders placed or performed during the hospital encounter of 01/04/24   Chest XR,  PA & LAT     Status: None    Narrative    CHEST TWO VIEWS 1/4/2024 4:10 PM     HISTORY: cough, wheezing, assess for pneumonia    COMPARISON: None.       Impression    IMPRESSION: There are no acute infiltrates. The cardiac silhouette is  not enlarged. Pulmonary vasculature is unremarkable.    ISABELLE BENITEZ MD         SYSTEM ID:  [id number]   Symptomatic Influenza A/B, RSV, & SARS-CoV2 PCR (COVID-19) Nose     Status: Normal    Specimen: Nose; Swab   Result Value Ref Range    Influenza A PCR Negative Negative    Influenza B PCR Negative Negative    RSV PCR Negative Negative    SARS CoV2 PCR Negative Negative "    Narrative    Testing was performed using the Xpert Xpress CoV2/Flu/RSV Assay on the [employer] GeneXpert Instrument. This test should be ordered for the detection of SARS-CoV-2, influenza, and RSV viruses in individuals who meet clinical and/or epidemiological criteria. Test performance is unknown in asymptomatic patients. This test is for in vitro diagnostic use under the FDA EUA for laboratories certified under CLIA to perform high or moderate complexity testing. This test has not been FDA cleared or approved. A negative result does not rule out the presence of PCR inhibitors in the specimen or target RNA in concentration below the limit of detection for the assay. If only one viral target is positive but coinfection with multiple targets is suspected, the sample should be re-tested with another FDA cleared, approved, or authorized test, if coinfection would change clinical management. This test was validated by the [facility]. These laboratories are certified under the Clinical Laboratory Improvement Amendments of 1988 (CLIA-88) as qualified to perform high complexity laboratory testing.       Medications   albuterol (PROVENTIL) neb solution 2.5 mg (2.5 mg Nebulization $Given 1/4/24 9250)     Patient tolerated albuterol neb and did report some symptomatic improvement.  On repeat auscultation wheezing persisted diffusely.  She was ambulated in the department and had oxygen saturations are typically in the low 90s, did drop to 88% on room after ambulation however increased back to above 90%  within 1 minute    Assessments & Plan (with Medical Decision Making)     I have reviewed the nursing notes.  I have reviewed the findings, diagnosis, plan and need for follow up with the patient.       Discharge Medication List as of 1/4/2024  4:32 PM        START taking these medications    Details   albuterol (PROAIR HFA/PROVENTIL HFA/VENTOLIN HFA) 108 (90 Base) MCG/ACT inhaler Inhale 2 puffs into the  lungs every 6 hours as needed, Disp-18 g, R-0, E-PrescribePharmacy may dispense brand covered by insurance (Proair, or proventil or ventolin or generic albuterol inhaler)      predniSONE (DELTASONE) 20 MG tablet Take two tablets (= 40mg) each day for 5 (five) days, Disp-10 tablet, R-0, E-Prescribe             Final diagnoses:   Acute bronchitis     74-year-old female presents to urgent care with concern over 1 month history of cough with increasing chest tightness, shortness of breath, chest pressure for the last several weeks and eventually recommendation of sleep medicine provider that she be seen after they noted hypoxia in the clinic earlier today.  She had elevated blood pressure upon arrival, remainder of vital signs were stable with initial oxygen saturation at 93%.  Her physical exam was significant for diffuse expiratory wheezing.  She tolerated albuterol neb with some symptomatic improvement, wheezing diminished however did persist.  She had negative influenza, COVID-19, RSV testing.  Chest x-ray was negative for acute abnormality.  Patient was ambulated in the department and had oxygen saturations above 90% with activity however on initial rest.  She did have short drops down to 88%.  I discussed concern for hypoxia with patient and potential etiologies that would not show up on evaluation that we did today and reiterated recommendation she go to the ER for further evaluation and she again declined.  Ultimately we agreed to treat patient for viral bronchitis with prednisone, albuterol.  She was instructed to return if no improvement within the next 48 to 72 hours or sooner if she notices any change in her respiratory status.    Disclaimer: This note consists of symbols derived from keyboarding, dictation, and/or voice recognition software. As a result, there may be errors in the script that have gone undetected.  Please consider this when interpreting information found in the chart.    1/4/2024   [facility]  [facility] EMERGENCY DEPT       Annette Mccabe PA-C  01/06/24 1559

## 2024-01-04 NOTE — PROGRESS NOTES
Additional 15 minutes on the date of service was spent performing the following:    -Preparing to see the patient  -Obtaining and/or reviewing separately obtained history   -Ordering medications, tests, or procedures   -Documenting clinical information in the electronic or other health record     Thank you for the opportunity to participate in the care of Colleen Samaniego.     She is a 73 year old y/o female patient who comes to the sleep medicine clinic for follow up.  The patient was diagnosed with obstructive sleep apnea on 10/02/2012 (AHI = 10.5).  Her CPAP machine states that the motor life has been exceeded.  She would be interested in applying for a new CPAP machine.  The patient states that she has been suffering from shortness of breath and wheezing for a month.  During a spot oximetry test with walking for only a few steps her oxygen level dropped down from 92% to 87%.     Assessment and Plan:  In summary Colleen Samaniego is a 73 year old year old female who is here for follow up.  1. MADISON (obstructive sleep apnea)  I congratulated patient on her excellent CPAP usage.  I will keep her on same pressure settings for now.  Will also apply for her to get a new CPAP machine from [employer].  - COMPREHENSIVE DME    2. Morbid obesity (H)  Patient has worked with her provider to treat this.    3. Hypoxia/Wheezing  Recommend the patient proceed to ED or Urgent care. The patient would like to proceed with Urgent care on her own. She declined my recommendation to call 911.    Compliance Download data for 30 days:  Compliance: 87%  Pressure setting: CPAP 7 CWP  Leak: Minimal  Residual AHI: 0.7 events per hour  Mask Tolerance: Good  Skin irritation: None  DME: [employer]    Lab reviewed: Discussed with patient.    Cpap Fu Template    Question 1/1/2024  1:25 PM CST - Filed by Patient   Do you use a CPAP Machine at home? Yes   Overall, on a scale of 0-10 how would you rate your  CPAP? Not wprking properly.   Is your mask comfortable? Yes   Is your mask leaking? No   Do you notice snoring with mask on? Yes   Do you notice gasping arousals with mask on? Yes   Are you having significant oral or nasal dryness? Yes   Is the pressure setting comfortable?    What type of mask do you use? Nasal Mask   What is your typical bedtime? 9 pm   How long does it take you to go to sleep on PAP therapy? 30 minutes approximately   What time do you typically get out of bed for the day? Between 5 and 6 am   How many hours on average per night are you using PAP therapy? Varies   How many hours are you sleeping per night? Varies   Do you feel well rested in the morning? No       JOSH:  JOSH Total Score: 21  Total score - Soledad: 6 (1/1/2024  1:27 PM)       Patient Active Problem List   Diagnosis    History of rheumatic fever    Obstructive sleep apnea    Feelings Of Urinary Urgency    S/P knee replacement    Coronary artery disease due to calcified coronary lesion    History of pulmonary embolism    S/P CABG x 3    History of DVT (deep vein thrombosis)    GERD (gastroesophageal reflux disease)    Stable angina    Hypercholesteremia    Age-related osteoporosis without current pathological fracture    Morbid obesity (H)    Dyspnea on exertion       Past Medical History:   Diagnosis Date    Acute blood loss anemia 9/11/2015    Acute pulmonary embolism (H) 9/11/2015    Acute respiratory failure with hypoxia (H) 9/11/2015    Arthritis     Coronary artery disease     DVT (deep venous thrombosis) (H)     Family history of myocardial infarction     GERD (gastroesophageal reflux disease)     High cholesterol     History of anesthesia complications     slow to wake up    History of blood clots     Hypercholesterolemia     Left leg DVT (H) 9/2015    Postop Left TKA    PE (pulmonary thromboembolism) (H)     Pulmonary embolism, bilateral (H) 9/2015    Postop Left TKA    Rheumatic fever     1953    Sleep apnea, obstructive      uses CPAP    Thrombocytopenia (H24) 2016       Past Surgical History:   Procedure Laterality Date    APPENDECTOMY      BREAST CYST ASPIRATION      BYPASS GRAFT ARTERY CORONARY N/A 2016    Procedure: CORONARY ARTERY BYPASS GRAFT X 3, ENDOSCOPIC VEIN HARVEST, INTERNAL MAMMARY ARTERY, ANESTHESIA TRANSESOPHAGEAL ECHOCARDIOGRAM ;  Surgeon: Rancho Rodas MD;  Location: [facility];  Service:     CARDIAC CATHETERIZATION  2016    prior to CABG    CARDIAC CATHETERIZATION  2017    no intervention     SECTION       x2    HYSTERECTOMY      JOINT REPLACEMENT      left knee    OOPHORECTOMY      [place] CATH PLACEMENT & NJX CORONARY ART ANGIO IMG S&I N/A 2017    Procedure: Coronary Angiogram;  Surgeon: David Youngblood MD;  Location: [facility];  Service: Cardiology    [facility] CATH PLMT L HRT & ARTS W/NJX & ANGIO IMG S&I Left 2017    Procedure: Left Heart Catheterization Without Left Ventriculogram;  Surgeon: David Youngblood MD;  Location: [facility];  Service: Cardiology    WRIST SURGERY Right     [facility] TOTAL ABDOM HYSTERECTOMY      Description: Total Abdominal Hysterectomy;  Recorded: 2010;    [facility] TOTAL KNEE ARTHROPLASTY Left 2015    Procedure: LEFT TOTAL KNEE ARTHROPLASTY;  Surgeon: Good Suggs MD;  Location: [facility];  Service: Orthopedics    [facility] TOTAL KNEE ARTHROPLASTY Right 2018    Procedure:  RIGHT TOTAL KNEE ARTHROPLASTY COMPLEX BMI 46;  Surgeon: Good Suggs MD;  Location: [facility];  Service: Orthopedics       Current Outpatient Medications   Medication Sig Dispense Refill    alendronate (FOSAMAX) 70 MG tablet TAKE 1 TABLET ONCE EVERY 7 DAYS IN THE A.M. ON EMPTY STOMACH WITH FULL GLASS WATER 30MIN BEFORE FOOD 12 tablet 4    aspirin 81 MG EC tablet [ASPIRIN 81 MG EC TABLET] Take 81 mg by mouth daily.      Calcium Carb-Cholecalciferol 500-10 MG-MCG TABS Take 1 tablet by mouth daily      cholecalciferol, vitamin D3,  "1,000 unit (25 mcg) tablet Take 1,000 Units by mouth daily       ezetimibe (ZETIA) 10 MG tablet Take 1 tablet (10 mg) by mouth daily 90 tablet 3    metFORMIN (GLUCOPHAGE) 500 MG tablet Take 1 tablet (500 mg) by mouth daily (with dinner) for 7 days, THEN 2 tablets (1,000 mg) daily (with dinner) for 83 days. 173 tablet 0    metoprolol succinate ER (TOPROL XL) 50 MG 24 hr tablet Take 1 tablet (50 mg) by mouth daily 90 tablet 4    multivitamin with minerals (THERA-M) 9 mg iron-400 mcg Tab tablet [MULTIVITAMIN WITH MINERALS (THERA-M) 9 MG IRON-400 MCG TAB TABLET] Take 1 tablet by mouth daily.      rosuvastatin (CRESTOR) 40 MG tablet Take 1 tablet (40 mg) by mouth daily 90 tablet 4    semaglutide (OZEMPIC) 2 MG/3ML pen Inject 0.25 mg Subcutaneous every 7 days 3 mL 0    Semaglutide-Weight Management (WEGOVY) 0.25 MG/0.5ML pen Inject 0.25 mg Subcutaneous once a week (Patient not taking: Reported on 11/27/2023) 2 mL 0    topiramate (TOPAMAX) 25 MG tablet Take 1 tablet (25 mg) by mouth daily 90 tablet 3       Allergies   Allergen Reactions    Aleve [Naproxen] Unknown     Causes acid reflux    Amoxicillin Hives    Atorvastatin Unknown     Annotation: myalgia      Clavulanic Acid Nausea and Vomiting    Doxycycline Hyclate [Doxycycline] Nausea and Diarrhea    Ibuprofen Swelling     Face swelled and became red/ blotchy.  Can take aspirin without reaction    Simvastatin Unknown     Didn't work for her.  Did not lower cholesterol much       Physical Exam:  /75   Pulse 87   Resp 18   Wt 114.4 kg (252 lb 3.2 oz)   LMP  (LMP Unknown)   SpO2 92%   BMI 47.65 kg/m    BMI:Body mass index is 47.65 kg/m .   Head: Normocephalic.  EYES: EOMI  CV: Sinus tachycardia. S1 & S2 positive.  LUNGS: Bilateral wheezing heard diffusely in all quadrants.  ABDOMEN: Positive bowel sounds in all quadrants, soft, no rebound or guarding  MUSCULOSKELETAL: Bilateral 2+ leg swelling    Labs/Studies:      No results found for: \"PH\", \"PHARTERIAL\", " "\"PO2\", \"[id number]\", \"SAT\", \"PCO2\", \"HCO3\", \"BASEEXCESS\", \"ASYA\", \"BEB\"  Lab Results   Component Value Date    TSH 1.85 11/24/2023    TSH 1.42 08/18/2020     Lab Results   Component Value Date     (H) 11/24/2023     (H) 11/01/2022     Lab Results   Component Value Date    HGB 12.7 11/24/2023    HGB 13.0 09/08/2021     Lab Results   Component Value Date    BUN 25.2 (H) 11/24/2023    BUN 21.4 11/01/2022    CR 1.05 (H) 11/24/2023    CR 1.00 (H) 11/01/2022     Lab Results   Component Value Date    AST 35 11/24/2023    AST 28 11/01/2022    ALT 34 11/24/2023    ALT 25 11/01/2022    ALKPHOS 75 11/24/2023    ALKPHOS 82 11/01/2022    BILITOTAL 1.2 11/24/2023    BILITOTAL 1.0 11/01/2022     No results found for: \"UAMP\", \"UBARB\", \"BENZODIAZEUR\", \"UCANN\", \"UCOC\", \"OPIT\", \"UPCP\"    Recent Labs   Lab Test 11/24/23  1029 11/01/22  1043    141   POTASSIUM 4.4 4.9   CHLORIDE 108* 103   CO2 28 26   ANIONGAP 9 12   * 103*   BUN 25.2* 21.4   CR 1.05* 1.00*   ALECIA 9.5 9.6       No results found for: \"KEVIN\"    I reviewed the efficacy and compliance report from her device. Data summarized on the HPI and the PAP compliance flow sheet.     Patient verbalized understanding of these issues, agrees with the plan and all questions were answered today. Patient was given an opportuntity to voice any other symptoms or concerns not listed above. Patient did not have any other symptoms or concerns.      Link Torres DO  Board Certified in Internal Medicine and Sleep Medicine    (Note created with Dragon voice recognition and unintended spelling errors and word substitutions may occur)     Audio and visual devices were used for this virtual clinic visit with permission from patient.    "

## 2024-01-04 NOTE — NURSING NOTE
"Chief Complaint   Patient presents with    CPAP Follow Up       Initial /75   Pulse 87   Resp 18   Wt 114.4 kg (252 lb 3.2 oz)   LMP  (LMP Unknown)   SpO2 92%   BMI 47.65 kg/m   Estimated body mass index is 47.65 kg/m  as calculated from the following:    Height as of 11/24/23: 1.549 m (5' 1\").    Weight as of this encounter: 114.4 kg (252 lb 3.2 oz).    Medication Reconciliation: complete    Neck circumference:  inches /  centimeters.    DME: DANIEL Curtis MA  [facility]    "

## 2024-01-04 NOTE — PATIENT INSTRUCTIONS
Your Body mass index is 47.65 kg/m .  Weight management is a personal decision.  If you are interested in exploring weight loss strategies, the following discussion covers the approaches that may be successful. Body mass index (BMI) is one way to tell whether you are at a healthy weight, overweight, or obese. It measures your weight in relation to your height.  A BMI of 18.5 to 24.9 is in the healthy range. A person with a BMI of 25 to 29.9 is considered overweight, and someone with a BMI of 30 or greater is considered obese. More than two-thirds of American adults are considered overweight or obese.  Being overweight or obese increases the risk for further weight gain. Excess weight may lead to heart disease and diabetes.  Creating and following plans for healthy eating and physical activity may help you improve your health.  Weight control is part of healthy lifestyle and includes exercise, emotional health, and healthy eating habits. Careful eating habits lifelong are the mainstay of weight control. Though there are significant health benefits from weight loss, long-term weight loss with diet alone may be very difficult to achieve- studies show long-term success with dietary management in less than 10% of people. Attaining a healthy weight may be especially difficult to achieve in those with severe obesity. In some cases, medications, devices and surgical management might be considered.  What can you do?  If you are overweight or obese and are interested in methods for weight loss, you should discuss this with your provider.   Consider reducing daily calorie intake by 500 calories.   Keep a food journal.   Avoiding skipping meals, consider cutting portions instead.    Diet combined with exercise helps maintain muscle while optimizing fat loss. Strength training is particularly important for building and maintaining muscle mass. Exercise helps reduce stress, increase energy, and improves fitness. Increasing  exercise without diet control, however, may not burn enough calories to loose weight.     Start walking three days a week 10-20 minutes at a time  Work towards walking thirty minutes five days a week   Eventually, increase the speed of your walking for 1-2 minutes at time    In addition, we recommend that you review healthy lifestyles and methods for weight loss available through the National Institutes of Health patient information sites:  http://win.niddk.nih.gov/publications/index.htm    And look into health and wellness programs that may be available through your health insurance provider, employer, local community center, or [employer].

## 2024-01-17 ENCOUNTER — DOCUMENTATION ONLY (OUTPATIENT)
Dept: SLEEP MEDICINE | Facility: CLINIC | Age: 74
End: 2024-01-17
Payer: COMMERCIAL

## 2024-01-17 DIAGNOSIS — G47.33 OBSTRUCTIVE SLEEP APNEA (ADULT) (PEDIATRIC): Primary | ICD-10-CM

## 2024-01-17 NOTE — PROGRESS NOTES
Patient was offered choice of vendor and chose [facility].  Patient Colleen Samaniego was set up at [place]  on January 17, 2024. Patient received a Resmed Airsense 10 Pressures were set at  7 cm H2O.   Patient s ramp is off cm H2O for Off and FLEX/EPR is EPR, 2.  Patient received a Resmed Mask name: AIRFIT [id number]  Nasal mask size For Her, heated tubing and heated humidifier.  Patient has the following compliance requirements: none    Jaden Stevens

## 2024-03-04 DIAGNOSIS — E66.01 MORBID OBESITY (H): ICD-10-CM

## 2024-03-04 DIAGNOSIS — R73.03 PREDIABETES: ICD-10-CM

## 2024-03-04 NOTE — PROGRESS NOTES
1. Morbid obesity (H)  2. Prediabetes  - metFORMIN (GLUCOPHAGE) 500 MG tablet; Take 1 tablet (500 mg) by mouth 2 times daily (with meals)  Dispense: 180 tablet; Refill: 3     Adjusted prescription for 500mg BID dosing rather than 1000mg once daily.    Susan Maddox, DO

## 2024-11-24 DIAGNOSIS — I25.10 CORONARY ARTERY DISEASE DUE TO CALCIFIED CORONARY LESION: ICD-10-CM

## 2024-11-24 DIAGNOSIS — I25.84 CORONARY ARTERY DISEASE DUE TO CALCIFIED CORONARY LESION: ICD-10-CM

## 2024-11-24 DIAGNOSIS — E78.00 HYPERCHOLESTEREMIA: ICD-10-CM

## 2024-11-25 RX ORDER — EZETIMIBE 10 MG/1
10 TABLET ORAL DAILY
Qty: 90 TABLET | Refills: 0 | Status: SHIPPED | OUTPATIENT
Start: 2024-11-25

## 2024-11-25 NOTE — TELEPHONE ENCOUNTER
1. Hypercholesteremia  2. Coronary artery disease due to calcified coronary lesion  - ezetimibe (ZETIA) 10 MG tablet; TAKE 1 TABLET (10 MG) BY MOUTH DAILY.  Dispense: 90 tablet; Refill: 0     Colleen is scheduled this week. Bridging refill sent to pharmacy.    Susan Maddox DO

## 2024-11-29 PROBLEM — D12.4 BENIGN NEOPLASM OF DESCENDING COLON: Status: ACTIVE | Noted: 2023-02-13

## 2024-11-29 PROBLEM — Z86.0100 HISTORY OF COLONIC POLYPS: Status: ACTIVE | Noted: 2024-11-29

## 2024-11-29 PROBLEM — D12.2 BENIGN NEOPLASM OF ASCENDING COLON: Status: ACTIVE | Noted: 2023-02-13

## 2024-11-29 PROBLEM — K63.5 POLYP OF COLON: Status: RESOLVED | Noted: 2023-02-09 | Resolved: 2024-11-29

## 2024-11-29 PROBLEM — D12.4 BENIGN NEOPLASM OF DESCENDING COLON: Status: RESOLVED | Noted: 2023-02-13 | Resolved: 2024-11-29

## 2024-11-29 PROBLEM — K57.30 DIVERTICULAR DISEASE OF LARGE INTESTINE: Status: ACTIVE | Noted: 2023-02-09

## 2024-11-29 PROBLEM — K63.5 POLYP OF COLON: Status: ACTIVE | Noted: 2023-02-09

## 2024-11-29 PROBLEM — D12.2 BENIGN NEOPLASM OF ASCENDING COLON: Status: RESOLVED | Noted: 2023-02-13 | Resolved: 2024-11-29

## 2024-11-29 PROBLEM — R19.5 ABNORMAL FECES: Status: ACTIVE | Noted: 2023-02-13

## 2024-12-02 ENCOUNTER — PATIENT OUTREACH (OUTPATIENT)
Dept: CARE COORDINATION | Facility: CLINIC | Age: 74
End: 2024-12-02
Payer: COMMERCIAL

## 2024-12-02 DIAGNOSIS — E66.01 MORBID OBESITY (H): ICD-10-CM

## 2024-12-02 DIAGNOSIS — E78.00 HYPERCHOLESTEREMIA: ICD-10-CM

## 2024-12-02 DIAGNOSIS — I25.10 CORONARY ARTERY DISEASE DUE TO CALCIFIED CORONARY LESION: ICD-10-CM

## 2024-12-02 DIAGNOSIS — R73.03 PREDIABETES: ICD-10-CM

## 2024-12-02 DIAGNOSIS — I25.84 CORONARY ARTERY DISEASE DUE TO CALCIFIED CORONARY LESION: ICD-10-CM

## 2024-12-02 DIAGNOSIS — R74.01 TRANSAMINITIS: ICD-10-CM

## 2024-12-02 RX ORDER — TIRZEPATIDE 2.5 MG/.5ML
2.5 INJECTION, SOLUTION SUBCUTANEOUS
Qty: 2 ML | Refills: 0 | OUTPATIENT
Start: 2024-12-02

## 2024-12-03 ENCOUNTER — TELEPHONE (OUTPATIENT)
Dept: FAMILY MEDICINE | Facility: CLINIC | Age: 74
End: 2024-12-03

## 2024-12-03 ENCOUNTER — OFFICE VISIT (OUTPATIENT)
Dept: CARDIOLOGY | Facility: CLINIC | Age: 74
End: 2024-12-03
Payer: COMMERCIAL

## 2024-12-03 VITALS
DIASTOLIC BLOOD PRESSURE: 70 MMHG | WEIGHT: 256 LBS | BODY MASS INDEX: 48.33 KG/M2 | HEART RATE: 86 BPM | RESPIRATION RATE: 16 BRPM | SYSTOLIC BLOOD PRESSURE: 142 MMHG | HEIGHT: 61 IN

## 2024-12-03 DIAGNOSIS — I25.10 CORONARY ARTERY DISEASE DUE TO CALCIFIED CORONARY LESION: Primary | ICD-10-CM

## 2024-12-03 DIAGNOSIS — I25.84 CORONARY ARTERY DISEASE DUE TO CALCIFIED CORONARY LESION: Primary | ICD-10-CM

## 2024-12-03 PROCEDURE — G2211 COMPLEX E/M VISIT ADD ON: HCPCS | Performed by: INTERNAL MEDICINE

## 2024-12-03 PROCEDURE — 99214 OFFICE O/P EST MOD 30 MIN: CPT | Performed by: INTERNAL MEDICINE

## 2024-12-03 NOTE — PROGRESS NOTES
"    Cardiology Progress Note     Assessment:  Coronary artery disease status post coronary artery bypass graft surgery in 2016  Chronic  incisional chest discomfort with stable sternum/normal CT, improved  Chronic exertional dyspnea, multifactorial, suspect combination of deconditioning and obesity playing a major role, no evidence of heart failure, diaphragmatic paralysis or intrinsic lung disease, rule out progression of coronary artery disease  Benign positional vertigo, resolved  Hypercholesterolemia, on Crestor and Zetia, good control  Morbid obesity  Prediabetes  History of DVT and PE  DJD right knee, status post replacement      Plan:  Stress MRI to evaluate progression of coronary artery disease   Continue current medications until results of the MRI available    Follow-up in 1 year  The longitudinal plan of care for the diagnosis(es)/condition(s) as documented were addressed during this visit. Due to the added complexity in care, I will continue to support Colleen in the subsequent management and with ongoing continuity of care.   Subjective:   This is 74 year old female who comes in today for follow-up visit.  She reports that she gets short of breath with fairly minimal physical activities.  She also feels some mild chest tightness with shortness of breath.  She has not had PND and orthopnea.  She denies heart palpitations.  She has not had syncope.  Incisional chest pain has gotten better    Review of Systems:   Negative other than history of present illness    Objective:   BP (!) 142/70 (BP Location: Left arm, Patient Position: Sitting, Cuff Size: Adult Large)   Pulse 86   Resp 16   Ht 1.554 m (5' 1.2\")   Wt 116.1 kg (256 lb)   LMP  (LMP Unknown)   BMI 48.06 kg/m    Physical Exam:  GENERAL: no distress  NECK: No JVD  LUNGS: Clear to auscultation.  CARDIAC: regular rhythm, S1 & S2 normal.  No heaves, thrills, gallops or murmurs.  ABDOMEN: flat, negative hepatosplenomegaly, soft and " non-tender.  EXTREMITIES: No evidence of cyanosis, clubbing or edema.    Current Outpatient Medications   Medication Sig Dispense Refill    aspirin 81 MG EC tablet [ASPIRIN 81 MG EC TABLET] Take 81 mg by mouth daily.      Calcium Carb-Cholecalciferol 500-10 MG-MCG TABS Take 1 tablet by mouth daily      cholecalciferol, vitamin D3, 1,000 unit (25 mcg) tablet Take 1,000 Units by mouth daily       ezetimibe (ZETIA) 10 MG tablet Take 1 tablet (10 mg) by mouth daily. 90 tablet 3    metoprolol succinate ER (TOPROL XL) 50 MG 24 hr tablet Take 1 tablet (50 mg) by mouth daily. 90 tablet 4    multivitamin with minerals (THERA-M) 9 mg iron-400 mcg Tab tablet [MULTIVITAMIN WITH MINERALS (THERA-M) 9 MG IRON-400 MCG TAB TABLET] Take 1 tablet by mouth daily.      rosuvastatin (CRESTOR) 40 MG tablet Take 1 tablet (40 mg) by mouth daily. 90 tablet 3    ZEPBOUND 2.5 MG/0.5ML prefilled pen Inject 0.5 mLs (2.5 mg) subcutaneously every 7 days. 2 mL 0       Cardiographics:    EC2018 read by me sinus rhythm incomplete right bundle branch block no changes from 2017     Coronary angio: May 2017      LM mild dz  LAD severe dz mid with patent LIMA, small diagonal  Circ severe dz in OM with patnet SVG  RCA severe dz in mid with patent SVG  LVEDP 8mmHg  Aortic root mildly enlarged       Patient began having severe chest pain when laying flat, worse with direct pressure over sternal incision site.      Imp/plan:  1. Chest pain - musculoskeletal -related to either incisino or to statin - schedule CTnon contrast of sternum, follow up with CT surgery in clinic, hold statin for four weeks and rechalleng  2. CAD s/p CABG 3 vessel all grafts patent      Echo: 2016  Normal left ventricular size and systolic function.   Left ventricular ejection fraction is visually estimated to be 65%.   No significant valvular abnormalities.      Holter: 2017  The predominant rhythm is sinus rhythm which is present throughout the monitoring period.   "Rates range from 52 bpm to 160 bpm.  There were no significant pauses or bradycardia.      Only for atrial premature beats were present over 24 hours.    No ventricular beats were present.      Symptoms:  Symptoms of Dizziness were associated with Sinus rhythm 83 bpm.  Symptoms of shortness of breath were associated with sinus tachycardia rate 104 and 128 bpm.  Symptoms of chest pressure was associated with sinus rhythm rate 91 bpm, all without ectopy        September 2021  A single 7 beat run of nonsustained VT was noted.  Otherwise first-degree AV block was present with minor sinus arrhythmia.     Lab Results    Chemistry/lipid CBC Cardiac Enzymes/BNP/TSH/INR   Recent Labs   Lab Test 11/29/24  1111   CHOL 303*   HDL 43*   *   TRIG 329*     Recent Labs   Lab Test 11/29/24  1111 11/24/23  1029 10/17/22  0853   * 64 71     Recent Labs   Lab Test 11/29/24  1111      POTASSIUM 4.4   CHLORIDE 106   CO2 28   GLC 96   BUN 24.8*   CR 0.97*   GFRESTIMATED 61   ALECIA 9.5     Recent Labs   Lab Test 11/29/24  1111 11/24/23  1029 11/01/22  1043   CR 0.97* 1.05* 1.00*     Recent Labs   Lab Test 11/29/24  1112 11/24/23  1029   A1C 5.8* 5.9*          Recent Labs   Lab Test 11/29/24  1112   WBC 6.3   HGB 12.2   HCT 38.8   MCV 94        Recent Labs   Lab Test 11/29/24  1112 11/24/23  1029 09/08/21  1136   HGB 12.2 12.7 13.0    No results for input(s): \"TROPONINI\" in the last 38202 hours.  Recent Labs   Lab Test 10/07/19  0907   BNP 30     Recent Labs   Lab Test 11/24/23  1029   TSH 1.85     Recent Labs   Lab Test 05/11/18  0622 05/10/18  0550 05/09/18  0646   INR 1.39* 1.25* 1.13*                     "

## 2024-12-03 NOTE — PATIENT INSTRUCTIONS
Colleen Samaniego,    It was a pleasure to see you today at [facility].     My recommendations after this visit include:    Stress MRI    TIFFANIE Lima MD, FACC, VINAY

## 2024-12-03 NOTE — LETTER
"12/3/2024    Susan Maddox, DO  [address]  [facility] [address]    RE: Colleen Samaniego       Dear Colleague,     I had the pleasure of seeing Colleen Samaniego in the [facility].      Cardiology Progress Note     Assessment:  Coronary artery disease status post coronary artery bypass graft surgery in 2016  Chronic  incisional chest discomfort with stable sternum/normal CT, improved  Chronic exertional dyspnea, multifactorial, suspect combination of deconditioning and obesity playing a major role, no evidence of heart failure, diaphragmatic paralysis or intrinsic lung disease, rule out progression of coronary artery disease  Benign positional vertigo, resolved  Hypercholesterolemia, on Crestor and Zetia, good control  Morbid obesity  Prediabetes  History of DVT and PE  DJD right knee, status post replacement      Plan:  Stress MRI to evaluate progression of coronary artery disease   Continue current medications until results of the MRI available    Follow-up in 1 year  The longitudinal plan of care for the diagnosis(es)/condition(s) as documented were addressed during this visit. Due to the added complexity in care, I will continue to support Colleen in the subsequent management and with ongoing continuity of care.   Subjective:   This is 74 year old female who comes in today for follow-up visit.  She reports that she gets short of breath with fairly minimal physical activities.  She also feels some mild chest tightness with shortness of breath.  She has not had PND and orthopnea.  She denies heart palpitations.  She has not had syncope.  Incisional chest pain has gotten better    Review of Systems:   Negative other than history of present illness    Objective:   BP (!) 142/70 (BP Location: Left arm, Patient Position: Sitting, Cuff Size: Adult Large)   Pulse 86   Resp 16   Ht 1.554 m (5' 1.2\")   Wt 116.1 kg (256 lb)   LMP  (LMP Unknown)   BMI 48.06 kg/m    Physical Exam:  GENERAL: no " Patient calling, requesting rx refill.     Med attached, pharmacy verified.      distress  NECK: No JVD  LUNGS: Clear to auscultation.  CARDIAC: regular rhythm, S1 & S2 normal.  No heaves, thrills, gallops or murmurs.  ABDOMEN: flat, negative hepatosplenomegaly, soft and non-tender.  EXTREMITIES: No evidence of cyanosis, clubbing or edema.    Current Outpatient Medications   Medication Sig Dispense Refill     aspirin 81 MG EC tablet [ASPIRIN 81 MG EC TABLET] Take 81 mg by mouth daily.       Calcium Carb-Cholecalciferol 500-10 MG-MCG TABS Take 1 tablet by mouth daily       cholecalciferol, vitamin D3, 1,000 unit (25 mcg) tablet Take 1,000 Units by mouth daily        ezetimibe (ZETIA) 10 MG tablet Take 1 tablet (10 mg) by mouth daily. 90 tablet 3     metoprolol succinate ER (TOPROL XL) 50 MG 24 hr tablet Take 1 tablet (50 mg) by mouth daily. 90 tablet 4     multivitamin with minerals (THERA-M) 9 mg iron-400 mcg Tab tablet [MULTIVITAMIN WITH MINERALS (THERA-M) 9 MG IRON-400 MCG TAB TABLET] Take 1 tablet by mouth daily.       rosuvastatin (CRESTOR) 40 MG tablet Take 1 tablet (40 mg) by mouth daily. 90 tablet 3     ZEPBOUND 2.5 MG/0.5ML prefilled pen Inject 0.5 mLs (2.5 mg) subcutaneously every 7 days. 2 mL 0       Cardiographics:    EC2018 read by me sinus rhythm incomplete right bundle branch block no changes from 2017     Coronary angio: May 2017      LM mild dz  LAD severe dz mid with patent LIMA, small diagonal  Circ severe dz in OM with patnet SVG  RCA severe dz in mid with patent SVG  LVEDP 8mmHg  Aortic root mildly enlarged       Patient began having severe chest pain when laying flat, worse with direct pressure over sternal incision site.      Imp/plan:  1. Chest pain - musculoskeletal -related to either incisino or to statin - schedule CTnon contrast of sternum, follow up with CT surgery in clinic, hold statin for four weeks and rechalleng  2. CAD s/p CABG 3 vessel all grafts patent      Echo: 2016  Normal left ventricular size and systolic function.   Left ventricular  "ejection fraction is visually estimated to be 65%.   No significant valvular abnormalities.      Holter: July 2017  The predominant rhythm is sinus rhythm which is present throughout the monitoring period.  Rates range from 52 bpm to 160 bpm.  There were no significant pauses or bradycardia.      Only for atrial premature beats were present over 24 hours.    No ventricular beats were present.      Symptoms:  Symptoms of Dizziness were associated with Sinus rhythm 83 bpm.  Symptoms of shortness of breath were associated with sinus tachycardia rate 104 and 128 bpm.  Symptoms of chest pressure was associated with sinus rhythm rate 91 bpm, all without ectopy        September 2021  A single 7 beat run of nonsustained VT was noted.  Otherwise first-degree AV block was present with minor sinus arrhythmia.     Lab Results    Chemistry/lipid CBC Cardiac Enzymes/BNP/TSH/INR   Recent Labs   Lab Test 11/29/24  1111   CHOL 303*   HDL 43*   *   TRIG 329*     Recent Labs   Lab Test 11/29/24  1111 11/24/23  1029 10/17/22  0853   * 64 71     Recent Labs   Lab Test 11/29/24  1111      POTASSIUM 4.4   CHLORIDE 106   CO2 28   GLC 96   BUN 24.8*   CR 0.97*   GFRESTIMATED 61   ALECIA 9.5     Recent Labs   Lab Test 11/29/24  1111 11/24/23  1029 11/01/22  1043   CR 0.97* 1.05* 1.00*     Recent Labs   Lab Test 11/29/24  1112 11/24/23  1029   A1C 5.8* 5.9*          Recent Labs   Lab Test 11/29/24  1112   WBC 6.3   HGB 12.2   HCT 38.8   MCV 94        Recent Labs   Lab Test 11/29/24  1112 11/24/23  1029 09/08/21  1136   HGB 12.2 12.7 13.0    No results for input(s): \"TROPONINI\" in the last 09191 hours.  Recent Labs   Lab Test 10/07/19  0907   BNP 30     Recent Labs   Lab Test 11/24/23  1029   TSH 1.85     Recent Labs   Lab Test 05/11/18  0622 05/10/18  0550 05/09/18  0646   INR 1.39* 1.25* 1.13*                         Thank you for allowing me to participate in the care of your patient.      Sincerely,     Devan" MD Janie     [facility]  cc:   Susan Maddox,   [address] [place] [address]

## 2024-12-03 NOTE — TELEPHONE ENCOUNTER
Incoming message from pharmacy:  ZEPBOUND 2.5 MG/0.5ML prefilled pen not covered by insurance, prior authorization required

## 2024-12-03 NOTE — PROGRESS NOTES
[employer] msg sent to pt letting her know that our  would be reaching out to her to arrange the cardiac MRI with stress. william

## 2024-12-05 NOTE — TELEPHONE ENCOUNTER
Central Prior Authorization Team   Phone: [phone]    PA Initiation    Medication: ZEPBOUND 2.5 MG/0.5ML prefilled pen  Insurance Company: [employer] - Phone [phone] Fax [phone]  Pharmacy Filling the Rx: [id number] - [place], [place] - [facility] [address]   Filling Pharmacy Phone: [phone]  Filling Pharmacy Fax:    Start Date: 12/5/2024

## 2024-12-09 NOTE — TELEPHONE ENCOUNTER
PRIOR AUTHORIZATION DENIED    Medication: ZEPBOUND 2.5 MG/0.5ML prefilled pen    Denial Date: 12/9/2024    Denial Rational:  Drug exclusions can not be appealed.  This medication will not be covered by the prescription plan for any reason. The drug is not on formulary and there are no loopholes to gaining approval.        Appeal Information:  Drug exclusions can not be appealed.  This medication will not be covered by the prescription plan for any reason. The drug is not on formulary and there are no loopholes to gaining approval.

## 2024-12-17 ENCOUNTER — ALLIED HEALTH/NURSE VISIT (OUTPATIENT)
Dept: FAMILY MEDICINE | Facility: CLINIC | Age: 74
End: 2024-12-17
Payer: COMMERCIAL

## 2024-12-17 ENCOUNTER — HOSPITAL ENCOUNTER (OUTPATIENT)
Dept: BONE DENSITY | Facility: HOSPITAL | Age: 74
Discharge: HOME | End: 2024-12-17
Attending: FAMILY MEDICINE
Payer: COMMERCIAL

## 2024-12-17 ENCOUNTER — ANCILLARY PROCEDURE (OUTPATIENT)
Dept: MAMMOGRAPHY | Facility: CLINIC | Age: 74
End: 2024-12-17
Attending: FAMILY MEDICINE
Payer: COMMERCIAL

## 2024-12-17 VITALS — SYSTOLIC BLOOD PRESSURE: 117 MMHG | HEART RATE: 56 BPM | DIASTOLIC BLOOD PRESSURE: 56 MMHG

## 2024-12-17 DIAGNOSIS — Z12.31 ENCOUNTER FOR SCREENING MAMMOGRAM FOR BREAST CANCER: ICD-10-CM

## 2024-12-17 DIAGNOSIS — M81.0 AGE-RELATED OSTEOPOROSIS WITHOUT CURRENT PATHOLOGICAL FRACTURE: ICD-10-CM

## 2024-12-17 DIAGNOSIS — I10 ESSENTIAL HYPERTENSION: Primary | ICD-10-CM

## 2024-12-17 PROCEDURE — 77067 SCR MAMMO BI INCL CAD: CPT

## 2024-12-17 PROCEDURE — 77080 DXA BONE DENSITY AXIAL: CPT

## 2024-12-17 PROCEDURE — 77063 BREAST TOMOSYNTHESIS BI: CPT

## 2024-12-17 PROCEDURE — 99207 PR NO CHARGE NURSE ONLY: CPT

## 2024-12-17 NOTE — PROGRESS NOTES
Follow Up Blood Pressure Check    Colleen Samaniego is a 74 year old female recommended to follow up for blood pressure check by Susan Maddox. Anihypertensive medications and adherence were verified: Yes.     Reason for visit:     Medication change at last visit:     Today's Vitals:   Vitals:    12/17/24 0935 12/17/24 0953   BP: (!) 158/66 117/56   BP Location: Right arm Right arm   Patient Position: Sitting Sitting   Cuff Size: Adult Large Adult Large   Pulse: 52 56           Lowest blood pressure today is less than 140/90 and they deny signs or symptoms of new onset: severe headache, fatigue, confusion, vision changes, chest pain, pounding in the chest, neck, ears, irregular heartbeat, difficulty breathing, and blood in the urine.  Please inform patient of his/her blood pressure today.  If they are asymptomatic, the patient is to continue current medications.  This message will be routed to their provider, and they will be notified if a change in medication is recommended.      Reanna Gandara MA    Current Outpatient Medications   Medication Sig Dispense Refill    aspirin 81 MG EC tablet [ASPIRIN 81 MG EC TABLET] Take 81 mg by mouth daily.      Calcium Carb-Cholecalciferol 500-10 MG-MCG TABS Take 1 tablet by mouth daily      cholecalciferol, vitamin D3, 1,000 unit (25 mcg) tablet Take 1,000 Units by mouth daily       ezetimibe (ZETIA) 10 MG tablet Take 1 tablet (10 mg) by mouth daily. 90 tablet 3    metoprolol succinate ER (TOPROL XL) 50 MG 24 hr tablet Take 1 tablet (50 mg) by mouth daily. 90 tablet 4    multivitamin with minerals (THERA-M) 9 mg iron-400 mcg Tab tablet [MULTIVITAMIN WITH MINERALS (THERA-M) 9 MG IRON-400 MCG TAB TABLET] Take 1 tablet by mouth daily.      rosuvastatin (CRESTOR) 40 MG tablet Take 1 tablet (40 mg) by mouth daily. 90 tablet 3    ZEPBOUND 2.5 MG/0.5ML prefilled pen Inject 0.5 mLs (2.5 mg) subcutaneously every 7 days. 2 mL 0

## 2024-12-19 NOTE — RESULT ENCOUNTER NOTE
Patient updated by [employer] message with dexa results.       Serina Macias,  Your dexa results have returned and show further positive interval change in your bone density compared to last dexa screen. I recommend we move forward with a drug holiday (break) from Fosamax.  Let me know if you have questions or concerns regarding these results or recommendations.  Happy holidays!  Susan Maddox, DO

## 2025-01-02 ASSESSMENT — SLEEP AND FATIGUE QUESTIONNAIRES
HOW LIKELY ARE YOU TO NOD OFF OR FALL ASLEEP WHILE SITTING QUIETLY AFTER LUNCH WITHOUT ALCOHOL: SLIGHT CHANCE OF DOZING
HOW LIKELY ARE YOU TO NOD OFF OR FALL ASLEEP IN A CAR, WHILE STOPPED FOR A FEW MINUTES IN TRAFFIC: WOULD NEVER DOZE
HOW LIKELY ARE YOU TO NOD OFF OR FALL ASLEEP WHILE SITTING AND READING: SLIGHT CHANCE OF DOZING
HOW LIKELY ARE YOU TO NOD OFF OR FALL ASLEEP WHILE SITTING INACTIVE IN A PUBLIC PLACE: WOULD NEVER DOZE
HOW LIKELY ARE YOU TO NOD OFF OR FALL ASLEEP WHEN YOU ARE A PASSENGER IN A CAR FOR AN HOUR WITHOUT A BREAK: SLIGHT CHANCE OF DOZING
HOW LIKELY ARE YOU TO NOD OFF OR FALL ASLEEP WHILE WATCHING TV: SLIGHT CHANCE OF DOZING
HOW LIKELY ARE YOU TO NOD OFF OR FALL ASLEEP WHILE LYING DOWN TO REST IN THE AFTERNOON WHEN CIRCUMSTANCES PERMIT: SLIGHT CHANCE OF DOZING
HOW LIKELY ARE YOU TO NOD OFF OR FALL ASLEEP WHILE SITTING AND TALKING TO SOMEONE: WOULD NEVER DOZE

## 2025-01-07 ENCOUNTER — OFFICE VISIT (OUTPATIENT)
Dept: SLEEP MEDICINE | Facility: CLINIC | Age: 75
End: 2025-01-07
Payer: COMMERCIAL

## 2025-01-07 VITALS
DIASTOLIC BLOOD PRESSURE: 75 MMHG | SYSTOLIC BLOOD PRESSURE: 147 MMHG | WEIGHT: 254.2 LBS | BODY MASS INDEX: 47.99 KG/M2 | HEART RATE: 70 BPM | OXYGEN SATURATION: 98 % | HEIGHT: 61 IN | RESPIRATION RATE: 20 BRPM

## 2025-01-07 DIAGNOSIS — M81.0 AGE-RELATED OSTEOPOROSIS WITHOUT CURRENT PATHOLOGICAL FRACTURE: ICD-10-CM

## 2025-01-07 DIAGNOSIS — E66.01 MORBID OBESITY (H): ICD-10-CM

## 2025-01-07 DIAGNOSIS — G47.33 OSA (OBSTRUCTIVE SLEEP APNEA): Primary | ICD-10-CM

## 2025-01-07 PROCEDURE — 99214 OFFICE O/P EST MOD 30 MIN: CPT | Performed by: INTERNAL MEDICINE

## 2025-01-07 PROCEDURE — G2211 COMPLEX E/M VISIT ADD ON: HCPCS | Performed by: INTERNAL MEDICINE

## 2025-01-07 RX ORDER — ALENDRONATE SODIUM 70 MG/1
TABLET ORAL
Qty: 12 TABLET | Refills: 4 | OUTPATIENT
Start: 2025-01-07

## 2025-01-07 NOTE — PROGRESS NOTES
Additional 15 minutes on the date of service was spent performing the following:    -Preparing to see the patient  -Obtaining and/or reviewing separately obtained history   -Ordering medications, tests, or procedures   -Documenting clinical information in the electronic or other health record     Thank you for the opportunity to participate in the care of Colleen Samaniego.     She is a 75 year old y/o female patient who comes to the sleep medicine clinic for follow up. The patient was diagnosed with MADISON on 10/02/2012 (AHI=10.5).  The patient states that she continues to benefit from CPAP therapy and has no other complaints.     Assessment and Plan:  In summary Colleen Samaniego is a 75 year old year old female who is here for follow up.  1. MADISON (obstructive sleep apnea) (Primary)  I congratulated patient on her excellent CPAP usage.  I will keep her on same pressure settings for now.  - COMPREHENSIVE DME    2. Morbid obesity (H)  Healthy weigh management is recommended as part of the long term goal to improve MADISON. I will give the patient a hand out on the topic in the AVS.    Compliance Download data for 30 Days:  Compliance:100%  Pressure setting:CPAP 7 cwp  Leak:Minimal  Residual AHI:0.4 events per hour  Mask Tolerance:Good  Skin irritation:None  DME:[facility]    Lab reviewed: Discussed with patient.    Cpap Fu Template    Question 1/2/2025  2:30 PM CST - Filed by Patient   Do you use a CPAP Machine at home? Yes   Overall, on a scale of 0-10 how would you rate your CPAP? 9   Is your mask comfortable? Yes   Is your mask leaking? No   Do you notice snoring with mask on?    Do you notice gasping arousals with mask on?    Are you having significant oral or nasal dryness? Yes   Is the pressure setting comfortable? Yes   What type of mask do you use? Nasal Mask   What is your typical bedtime? 9 pm   How long does it take you to go to sleep on PAP therapy? 30 to 45 minutes   What time do you typically get out of bed for  the day? Between 4 to 5:30 am   How many hours on average per night are you using PAP therapy? 7   How many hours are you sleeping per night? 5 1/2 - 6   Do you feel well rested in the morning? No       JOSH:  JOSH Total Score: 10  Total score - [place]: 5 (1/7/2025  9:00 AM)    Failed to redirect to the Timeline version of the [employer] SmartLink.   Patient Active Problem List   Diagnosis    History of rheumatic fever    Obstructive sleep apnea    Feelings Of Urinary Urgency    S/P knee replacement    Coronary artery disease due to calcified coronary lesion    History of pulmonary embolism    S/P CABG x 3    History of DVT (deep vein thrombosis)    Stable angina (H)    Hypercholesteremia    Age-related osteoporosis without current pathological fracture    Morbid obesity (H)    Dyspnea on exertion    Diverticular disease of large intestine    Abnormal feces    History of colonic polyps       Past Medical History:   Diagnosis Date    Acute blood loss anemia 09/11/2015    Acute pulmonary embolism (H) 09/11/2015    Acute respiratory failure with hypoxia (H) 09/11/2015    Arthritis     Benign neoplasm of ascending colon 02/13/2023    Benign neoplasm of descending colon 02/13/2023    Coronary artery disease     DVT (deep venous thrombosis) (H)     Family history of myocardial infarction     GERD (gastroesophageal reflux disease)     GERD (gastroesophageal reflux disease) 05/10/2016    High cholesterol     History of anesthesia complications     slow to wake up    History of blood clots     Hypercholesterolemia     Left leg DVT (H) 09/01/2015    Postop Left TKA    PE (pulmonary thromboembolism) (H)     Pulmonary embolism, bilateral (H) 09/01/2015    Postop Left TKA    Rheumatic fever     1953    Sleep apnea, obstructive     uses CPAP    Thrombocytopenia (H) 05/05/2016       Past Surgical History:   Procedure Laterality Date    ABDOMEN SURGERY      APPENDECTOMY      BREAST CYST ASPIRATION      BYPASS GRAFT ARTERY CORONARY N/A  2016    Procedure: CORONARY ARTERY BYPASS GRAFT X 3, ENDOSCOPIC VEIN HARVEST, INTERNAL MAMMARY ARTERY, ANESTHESIA TRANSESOPHAGEAL ECHOCARDIOGRAM ;  Surgeon: Rancho Rodas MD;  Location: [facility];  Service:     CARDIAC CATHETERIZATION  2016    prior to CABG    CARDIAC CATHETERIZATION  2017    no intervention     SECTION       x2    COLONOSCOPY      EYE SURGERY      HYSTERECTOMY      JOINT REPLACEMENT      left knee    OOPHORECTOMY      MT CATH PLACEMENT & NJX CORONARY ART ANGIO IMG S&I N/A 2017    Procedure: Coronary Angiogram;  Surgeon: David Youngblood MD;  Location: [facility] Cath Lab;  Service: Cardiology    MT CATH PLMT L HRT & ARTS W/NJX & ANGIO IMG S&I Left 2017    Procedure: Left Heart Catheterization Without Left Ventriculogram;  Surgeon: David Youngblood MD;  Location: [facility] Cath Lab;  Service: Cardiology    WRIST SURGERY Right     [facility] TOTAL ABDOM HYSTERECTOMY      Description: Total Abdominal Hysterectomy;  Recorded: 2010;    [facility] TOTAL KNEE ARTHROPLASTY Left 2015    Procedure: LEFT TOTAL KNEE ARTHROPLASTY;  Surgeon: Good Suggs MD;  Location: [facility];  Service: Orthopedics    [facility] TOTAL KNEE ARTHROPLASTY Right 2018    Procedure:  RIGHT TOTAL KNEE ARTHROPLASTY COMPLEX BMI 46;  Surgeon: Good Suggs MD;  Location: [facility];  Service: Orthopedics       Current Outpatient Medications   Medication Sig Dispense Refill    aspirin 81 MG EC tablet [ASPIRIN 81 MG EC TABLET] Take 81 mg by mouth daily.      Calcium Carb-Cholecalciferol 500-10 MG-MCG TABS Take 1 tablet by mouth daily      cholecalciferol, vitamin D3, 1,000 unit (25 mcg) tablet Take 1,000 Units by mouth daily       ezetimibe (ZETIA) 10 MG tablet Take 1 tablet (10 mg) by mouth daily. 90 tablet 3    metoprolol succinate ER (TOPROL XL) 50 MG 24 hr tablet Take 1 tablet (50 mg) by mouth daily. 90 tablet 4    multivitamin with minerals  "(THERA-M) 9 mg iron-400 mcg Tab tablet [MULTIVITAMIN WITH MINERALS (THERA-M) 9 MG IRON-400 MCG TAB TABLET] Take 1 tablet by mouth daily.      rosuvastatin (CRESTOR) 40 MG tablet Take 1 tablet (40 mg) by mouth daily. 90 tablet 3       Allergies   Allergen Reactions    Aleve [Naproxen] Unknown     Causes acid reflux    Amoxicillin Hives    Atorvastatin Unknown     Annotation: myalgia      Clavulanic Acid Nausea and Vomiting    Doxycycline Hyclate [Doxycycline] Nausea and Diarrhea    Ibuprofen Swelling     Face swelled and became red/ blotchy.  Can take aspirin without reaction    Simvastatin Unknown     Didn't work for her.  Did not lower cholesterol much       Physical Exam:  BP (!) 147/75   Pulse 70   Resp 20   Ht 1.556 m (5' 1.25\")   Wt 115.3 kg (254 lb 3.2 oz)   LMP  (LMP Unknown)   SpO2 98%   BMI 47.64 kg/m    BMI:Body mass index is 47.64 kg/m .   GEN: NAD, morbidly obese  Head: Normocephalic.  EYES: EOMI  Psych: normal mood, normal affect    Labs/Studies:      Lab Results   Component Value Date    GLC 96 11/29/2024     (H) 11/24/2023     Lab Results   Component Value Date    HGB 12.2 11/29/2024    HGB 12.7 11/24/2023     Lab Results   Component Value Date    BUN 24.8 (H) 11/29/2024    BUN 25.2 (H) 11/24/2023    CR 0.97 (H) 11/29/2024    CR 1.05 (H) 11/24/2023     Lab Results   Component Value Date    AST 47 (H) 11/29/2024    AST 35 11/24/2023    ALT 81 (H) 11/29/2024    ALT 34 11/24/2023    ALKPHOS 93 11/29/2024    ALKPHOS 75 11/24/2023    BILITOTAL 0.6 11/29/2024    BILITOTAL 1.2 11/24/2023       Recent Labs   Lab Test 11/29/24  1111 11/24/23  1029    145   POTASSIUM 4.4 4.4   CHLORIDE 106 108*   CO2 28 28   ANIONGAP 8 9   GLC 96 106*   BUN 24.8* 25.2*   CR 0.97* 1.05*   ALECIA 9.5 9.5         Recent Results (from the past 744 hours)   MA Screen Bilateral w/Haider    Narrative    BILATERAL FULL FIELD DIGITAL SCREENING MAMMOGRAM WITH TOMOSYNTHESIS    Performed on: 12/17/24    Compared to: " 10/17/2022, 10/15/2021, 10/07/2020, and 10/12/2018    Technique:  This study was evaluated with the assistance of Computer-Aided   Detection.  Breast Tomosynthesis was used in interpretation.    Findings: The breasts are almost entirely fatty.  There is no radiographic   evidence of malignancy.     Impression    IMPRESSION: ACR BI-RADS Category 1: Negative    BREAST CANCER SCREENING RECOMMENDATION: Routine yearly mammography   beginning at age 40 or as discussed with your provider.    The results and recommendations of this examination will be communicated   to the patient.        Krystal Kurtz MD     DEXA HIP/PELVIS/SPINE - Future    Narrative    EXAM: DX TBS AXIAL  LOCATION: [facility]  DATE: 12/17/2024    INDICATION: BMD screening, follow-up. On Fosamax.  DEMOGRAPHICS: Age- 74 years. Gender- Female. Menopausal status- Postmenopausal.  COMPARISON: 10/26/2021  TECHNIQUE: Dual-energy x-ray absorptiometry (DXA) performed with routine technique.  Trabecular bone score (TBS) analysis performed.    FINDINGS:    DXA RESULTS  -Lumbar Spine: L1-L4: BMD: 1.022 g/cm2. T-score: -1.3. Z-score: 0.5.   -RIGHT Hip Total: BMD: 1.040 g/cm2. T-score: 0.3. Z-score: 2.0.  -RIGHT Hip Femoral neck: BMD: 0.882 g/cm2. T-score: -1.1. Z-score: 0.8.  -LEFT Hip Total: BMD: 1.007 g/cm2. T-score: 0.0. Z-score: 1.8.  -LEFT Hip Femoral neck: BMD: 0.860 g/cm2. T-score: -1.3. Z-score: 0.7.    WHO T-SCORE CRITERIA  -Normal: T score at or above -1 SD  -Osteopenia: T score between -1 and -2.5 SD  -Osteoporosis: T score at or below -2.5 SD    The World Health Organization (WHO) criteria is applicable to perimenopausal females, postmenopausal females, and men aged 50 years or older.    TBS RESULTS  -Lumbar Spine L1-L4: TBS: 1.208. TBS T-score: -2.8.TBS Z-score: -0.2.    The TBS is a DXA derived measurement for fracture risk assessment, and reflects the structural condition of the bone microarchitecture. It can be used  to adjust WHO Fracture Risk Assessment Tool (FRAX) probability of fracture in postmenopausal women and   older men. The calculated probabilities of fracture have been shown to be more accurate when computed with the TBS.    INTERVAL CHANGE  -There has been a 3.7% increase in lumbar spine BMD.  -There has been a 4.0% increase in bilateral hip BMD.    FRACTURE RISK  -The FRAX risk calculator is not applicable due to medication that is used for treatment of osteopenia/osteoporosis or can otherwise affect bone mineral density.      Impression    IMPRESSION: Low bone density (OSTEOPENIA). T score meets the WHO criteria for low bone density (osteopenia) at one or more measured sites. The risk of osteoporotic fracture increases approximately two-fold for each standard deviation decrease in T-score.       I reviewed the efficacy and compliance report from her device. Data summarized on the HPI and the PAP compliance flow sheet.     Patient was strongly advised in AVS to avoid driving, operating any heavy machinery or other hazardous situations while drowsy or sleepy. Patient was counseled on the importance of driving while alert, to pull over if drowsy, or nap before getting into the vehicle if sleepy.     Patient verbalized understanding of these issues, agrees with the plan and all questions were answered today. Patient was given an opportuntity to voice any other symptoms or concerns not listed above. Patient did not have any other symptoms or concerns.      Link Torres DO  Board Certified in Internal Medicine and Sleep Medicine    (Note created with Dragon voice recognition and unintended spelling errors and word substitutions may occur)     Audio and visual devices were used for this virtual clinic visit with permission from patient.

## 2025-01-07 NOTE — NURSING NOTE
DME orders have been automatically faxed to [facility]. 2 year appointment reminder will be sent via My Chart.   Ruby Jurado CMA

## 2025-01-07 NOTE — TELEPHONE ENCOUNTER
Age-related osteoporosis without current pathological fracture  Discussed drug holiday at last appointment.  Susan Maddox DO

## 2025-01-07 NOTE — PATIENT INSTRUCTIONS
Please do not drive drowsy under any circumstances.  If you find yourself in a situation in which you are driving and feeling sleepy, please pull over right away in a safe place and take a quick nap before getting back on the road.    Your Body mass index is 47.64 kg/m .  Weight management is a personal decision.  If you are interested in exploring weight loss strategies, the following discussion covers the approaches that may be successful. Body mass index (BMI) is one way to tell whether you are at a healthy weight, overweight, or obese. It measures your weight in relation to your height.  A BMI of 18.5 to 24.9 is in the healthy range. A person with a BMI of 25 to 29.9 is considered overweight, and someone with a BMI of 30 or greater is considered obese. More than two-thirds of American adults are considered overweight or obese.  Being overweight or obese increases the risk for further weight gain. Excess weight may lead to heart disease and diabetes.  Creating and following plans for healthy eating and physical activity may help you improve your health.  Weight control is part of healthy lifestyle and includes exercise, emotional health, and healthy eating habits. Careful eating habits lifelong are the mainstay of weight control. Though there are significant health benefits from weight loss, long-term weight loss with diet alone may be very difficult to achieve- studies show long-term success with dietary management in less than 10% of people. Attaining a healthy weight may be especially difficult to achieve in those with severe obesity. In some cases, medications, devices and surgical management might be considered.  What can you do?  If you are overweight or obese and are interested in methods for weight loss, you should discuss this with your provider.   Consider reducing daily calorie intake by 500 calories.   Keep a food journal.   Avoiding skipping meals, consider cutting portions instead.    Diet combined  with exercise helps maintain muscle while optimizing fat loss. Strength training is particularly important for building and maintaining muscle mass. Exercise helps reduce stress, increase energy, and improves fitness. Increasing exercise without diet control, however, may not burn enough calories to loose weight.     Start walking three days a week 10-20 minutes at a time  Work towards walking thirty minutes five days a week   Eventually, increase the speed of your walking for 1-2 minutes at time    In addition, we recommend that you review healthy lifestyles and methods for weight loss available through the National Institutes of Health patient information sites:  http://win.niddk.nih.gov/publications/index.htm    And look into health and wellness programs that may be available through your health insurance provider, employer, local community center, or [employer].

## 2025-01-07 NOTE — NURSING NOTE
"Chief Complaint   Patient presents with    CPAP Follow Up       Initial BP (!) 147/75   Pulse 70   Resp 20   Ht 1.556 m (5' 1.25\")   Wt 115.3 kg (254 lb 3.2 oz)   LMP  (LMP Unknown)   SpO2 98%   BMI 47.64 kg/m   Estimated body mass index is 47.64 kg/m  as calculated from the following:    Height as of this encounter: 1.556 m (5' 1.25\").    Weight as of this encounter: 115.3 kg (254 lb 3.2 oz).    Medication Reconciliation: complete  ESS: 5  Neck circumference: 15.75 inches / 40 centimeters.  DME: BRAEDEN Jurado CMA      "

## 2025-01-13 ENCOUNTER — TELEPHONE (OUTPATIENT)
Dept: CARDIOLOGY | Facility: CLINIC | Age: 75
End: 2025-01-13
Payer: COMMERCIAL

## 2025-01-13 NOTE — TELEPHONE ENCOUNTER
Encounter created for documentation- cardiac stress MRI rescheduled due to awaiting insurance coverage determination. -[facility]

## 2025-01-13 NOTE — TELEPHONE ENCOUNTER
----- Message from Caitlyn Villafana sent at 1/13/2025 11:21 AM CST -----  Regarding: RE: MRI pending insurance coverage determination  Hi Mal!    Ok, I have her rescheduled to 2/6! Thank you!  ----- Message -----  From: Lizzeth Ramos, RN  Sent: 1/13/2025  11:10 AM CST  To: Caitlyn Villafana  Subject: FW: MRI pending insurance coverage determina#    Hi Caitlyn!    Could you please reschedule this patient's cardiac stress mri? Her insurance is still reviewing it for authorization and I do not want her to potentially go forward and have it be denied.    Thanks!!  Mal  ----- Message -----  From: Kyler Lima MD  Sent: 1/13/2025  10:51 AM CST  To: Lizzeth Ramos RN  Subject: RE: MRI pending insurance coverage determina#    yes  ----- Message -----  From: Lizzeth Ramos RN  Sent: 1/13/2025  10:49 AM CST  To: Kyler Lima MD; [facility] C[facility] Rn Team [facility]  Subject: RE: MRI pending insurance coverage determina#    Dr. Lima- OK to bump out cardiac stress mri to allow for insurance review?  Thanks,  Mal  ----- Message -----  From: Susan Perry RN  Sent: 1/13/2025  10:41 AM CST  To: Kyler Lima MD; [facility] Rn Team [facility]  Subject: MRI pending insurance coverage determination     Patient is scheduled for MRI tomorrow as ordered by Dr. Lima. It is currently under medical director review through her insurance. If safe to do so, it is recommended that the MRI is rescheduled. Thanks! -[email or web address]

## 2025-02-05 ENCOUNTER — TELEPHONE (OUTPATIENT)
Dept: CARDIOLOGY | Facility: CLINIC | Age: 75
End: 2025-02-05
Payer: COMMERCIAL

## 2025-02-06 ENCOUNTER — HOSPITAL ENCOUNTER (OUTPATIENT)
Dept: MRI IMAGING | Facility: HOSPITAL | Age: 75
End: 2025-02-06
Attending: INTERNAL MEDICINE
Payer: COMMERCIAL

## 2025-02-06 VITALS
SYSTOLIC BLOOD PRESSURE: 140 MMHG | RESPIRATION RATE: 20 BRPM | OXYGEN SATURATION: 94 % | DIASTOLIC BLOOD PRESSURE: 62 MMHG | HEART RATE: 91 BPM

## 2025-02-06 DIAGNOSIS — I25.84 CORONARY ARTERY DISEASE DUE TO CALCIFIED CORONARY LESION: ICD-10-CM

## 2025-02-06 DIAGNOSIS — R06.09 DYSPNEA ON EXERTION: Primary | ICD-10-CM

## 2025-02-06 DIAGNOSIS — I25.10 CORONARY ARTERY DISEASE DUE TO CALCIFIED CORONARY LESION: ICD-10-CM

## 2025-02-06 LAB
ATRIAL RATE - MUSE: 82 BPM
ATRIAL RATE - MUSE: 89 BPM
DIASTOLIC BLOOD PRESSURE - MUSE: NORMAL MMHG
DIASTOLIC BLOOD PRESSURE - MUSE: NORMAL MMHG
INTERPRETATION ECG - MUSE: NORMAL
INTERPRETATION ECG - MUSE: NORMAL
P AXIS - MUSE: 56 DEGREES
P AXIS - MUSE: 60 DEGREES
PR INTERVAL - MUSE: 170 MS
PR INTERVAL - MUSE: 174 MS
QRS DURATION - MUSE: 94 MS
QRS DURATION - MUSE: 94 MS
QT - MUSE: 388 MS
QT - MUSE: 388 MS
QTC - MUSE: 453 MS
QTC - MUSE: 472 MS
R AXIS - MUSE: -7 DEGREES
R AXIS - MUSE: 14 DEGREES
SYSTOLIC BLOOD PRESSURE - MUSE: NORMAL MMHG
SYSTOLIC BLOOD PRESSURE - MUSE: NORMAL MMHG
T AXIS - MUSE: 104 DEGREES
T AXIS - MUSE: 108 DEGREES
VENTRICULAR RATE- MUSE: 82 BPM
VENTRICULAR RATE- MUSE: 89 BPM

## 2025-02-06 PROCEDURE — 75563 CARD MRI W/STRESS IMG & DYE: CPT

## 2025-02-06 PROCEDURE — 250N000011 HC RX IP 250 OP 636: Performed by: INTERNAL MEDICINE

## 2025-02-06 PROCEDURE — 255N000002 HC RX 255 OP 636: Performed by: INTERNAL MEDICINE

## 2025-02-06 PROCEDURE — A9585 GADOBUTROL INJECTION: HCPCS | Performed by: INTERNAL MEDICINE

## 2025-02-06 RX ORDER — CAFFEINE CITRATE 20 MG/ML
60 SOLUTION INTRAVENOUS
Status: ACTIVE | OUTPATIENT
Start: 2025-02-06 | End: 2025-02-06

## 2025-02-06 RX ORDER — GADOBUTROL 604.72 MG/ML
22 INJECTION INTRAVENOUS ONCE
Status: COMPLETED | OUTPATIENT
Start: 2025-02-06 | End: 2025-02-06

## 2025-02-06 RX ORDER — AMINOPHYLLINE 25 MG/ML
50-100 INJECTION, SOLUTION INTRAVENOUS
Status: ACTIVE | OUTPATIENT
Start: 2025-02-06

## 2025-02-06 RX ORDER — CAFFEINE 200 MG
200 TABLET ORAL
Status: ACTIVE | OUTPATIENT
Start: 2025-02-06 | End: 2025-02-06

## 2025-02-06 RX ORDER — REGADENOSON 0.08 MG/ML
0.4 INJECTION, SOLUTION INTRAVENOUS ONCE
Status: COMPLETED | OUTPATIENT
Start: 2025-02-06 | End: 2025-02-06

## 2025-02-06 RX ADMIN — GADOBUTROL 22 ML: 604.72 INJECTION INTRAVENOUS at 09:52

## 2025-02-06 RX ADMIN — REGADENOSON 0.4 MG: 0.08 INJECTION, SOLUTION INTRAVENOUS at 09:43
